# Patient Record
Sex: FEMALE | Race: WHITE | Employment: FULL TIME | URBAN - METROPOLITAN AREA
[De-identification: names, ages, dates, MRNs, and addresses within clinical notes are randomized per-mention and may not be internally consistent; named-entity substitution may affect disease eponyms.]

---

## 2017-06-23 ENCOUNTER — OFFICE VISIT (OUTPATIENT)
Dept: FAMILY MEDICINE | Facility: CLINIC | Age: 37
End: 2017-06-23

## 2017-06-23 VITALS
DIASTOLIC BLOOD PRESSURE: 70 MMHG | HEIGHT: 65 IN | HEART RATE: 71 BPM | OXYGEN SATURATION: 96 % | RESPIRATION RATE: 20 BRPM | SYSTOLIC BLOOD PRESSURE: 103 MMHG | WEIGHT: 197 LBS | TEMPERATURE: 97.9 F | BODY MASS INDEX: 32.82 KG/M2

## 2017-06-23 DIAGNOSIS — F64.0 GENDER DYSPHORIA IN ADULT: Primary | ICD-10-CM

## 2017-06-23 DIAGNOSIS — F15.11 METHAMPHETAMINE ABUSE IN REMISSION (H): ICD-10-CM

## 2017-06-23 RX ORDER — LURASIDONE HYDROCHLORIDE 80 MG/1
80 TABLET, FILM COATED ORAL DAILY
Qty: 1 TABLET | Refills: 0 | COMMUNITY
Start: 2017-06-23 | End: 2019-07-02

## 2017-06-23 RX ORDER — PRAZOSIN HYDROCHLORIDE 1 MG/1
3 CAPSULE ORAL AT BEDTIME
Qty: 1 CAPSULE | Refills: 0 | COMMUNITY
Start: 2017-06-23 | End: 2017-07-21

## 2017-06-23 RX ORDER — GABAPENTIN 100 MG/1
100 CAPSULE ORAL 2 TIMES DAILY
Qty: 1 CAPSULE | Refills: 0 | COMMUNITY
Start: 2017-06-23 | End: 2019-08-08

## 2017-06-23 NOTE — MR AVS SNAPSHOT
After Visit Summary   6/23/2017    Jesus Hurd    MRN: [id number]           Patient Information     Date Of Birth          1980        Visit Information        Provider Department      6/23/2017 3:20 PM Marychuy Fraire MD [facility]        Today's Diagnoses     Gender dysphoria in adult    -  1    Bipolar 1 disorder (H)        Anxiety        PTSD (post-traumatic stress disorder)          Care Instructions              Informed Consent   Testosterone Therapy       This form refers to the use of testosterone by persons in the female-to-male spectrum who wish to become more masculine to reduce gender dysphoria and facilitate a more masculine gender presentation. While there are risks associated with taking testosterone, when appropriately prescribed it can greatly improve mental health and quality of life.     Please seek another opinion if you want additional perspective on any aspect of your care.       Masculinizing Effects     1. I understand that testosterone may be prescribed to reduce female physical characteristics and masculinize my body.     2. I understand that the masculinizing effects of testosterone can take several months to a year to become noticeable, that the rate and degree of change can't be predicted, and that changes may not be complete for 2-5 years after I start testosterone.     3. I understand that these changes will likely be permanent even if I stop taking testosterone:    ? Lower voice pitch (i.e., voice becoming deeper).   ? Increased growth of hair, with thicker/coarser hairs, on arms, legs, chest, back, and abdomen.   ? Gradual growth of moustache/facial hair.   ? Hair loss at the temples and crown of the head, with the possibility of becoming completely bald.   ? Genital changes may or may not be permanent if testosterone is stopped. These include clitoral growth (typically 1-3 cm) and vaginal dryness.     4. I understand that the  "following changes are usually not permanent (that is, they will likely reverse if I stop taking testosterone). Although testosterone does not change these features, there are other treatments that may be helpful:    ? Acne, which may be severe and can cause permanent scarring if not treated.   ? Fat may redistribute to a more masculine pattern (decreased on buttocks/hips/thighs, increased in abdomen - changing from \"pear shape\" to \"apple shape\").   ? Increased muscle mass and upper body strength.   ? Increased libido (sex drive).   ? Menstrual periods typically stop within 3-6 months of starting testosterone.   5. I understand that it is not known what the effects of testosterone are on fertility. Fertility is reduced and I may never be able to get pregnant, even if I stop testosterone. On the other hand, even if my period stops, it may still be possible for me to get pregnant, and I am aware of birth control options (if applicable). I have been informed that I can't take testosterone if I am pregnant.     6. I understand that there are some aspects of my body that will not be changed by testosterone:   ? Breasts may appear slightly smaller due to fat loss, but will not substantially shrink   ? Although voice pitch will likely drop, other aspects of speech will not become more masculine.       Risks of Testosterone     7. I understand that the medical effects and safety of testosterone are not fully understood, and that there may be long-term risks that are not yet known.     8. I understand that I am strongly advised not to take more testosterone than I am prescribed, as this increases health risks. I have been informed that taking more will not make masculinization happen more quickly or increase the degree of change.     9. I understand that testosterone can cause changes that increase my risk of heart disease, including:     ? Decreasing good cholesterol (HDL) and increasing bad cholesterol (LDL)   ? Increasing " blood pressure   ? Increasing deposits of fat around my internal organ    I have been advised that my risks of heart disease are greater if people in my family have had heart disease, if I am overweight, or if I smoke.   I have been advised that heart health checkups, including monitoring of my weight and cholesterol levels, should be done periodically as long as I am taking testosterone.     10. I understand that testosterone can damage the liver, possibly leading to liver disease. I have been advised that I should be monitored for as long as I am taking testosterone.     11. I understand that testosterone can increase the red blood cells and hemoglobin, and while the increase is usually only to a normal male range (which does not pose health risks), a high increase can cause potentially life-threatening problems such as stroke and heart attack. I have been advised that my blood should be monitored periodically while I am taking testosterone.     12. I understand that taking testosterone can increase my risk for diabetes by decreasing my body's response to insulin, causing weight gain, and increasing deposits of fat around my internal organs. I have been advised that my fasting blood glucose should be monitored periodically while I am taking testosterone.       13. I understand that it is not known if testosterone increases the risk of ovarian, breast, or uterine cancer.     14. I understand that taking testosterone can lead to my cervix and the walls of my vagina becoming more fragile, and that this can lead to tears or abrasions that increase the risk of sexually transmitted infections (including HIV) if I have vaginal sex - no matter what the gender of my partner is. I have been advised that sandra discussion with my doctor about my sexual practices can help determine how best to prevent and monitor for sexually transmitted infections.     15. I have been informed that testosterone can cause headaches or  migraines. I understand that if I am frequently having headaches or migraines, or the pain is unusually severe, it is recommended that I talk with my health care provider.     16. I understand that testosterone can cause emotional changes, including increased irritability, frustration, and anger. I have been advised that my doctor can assist me in finding resources to explore and cope with these changes.     17. I understand that testosterone will result in changes that will be noticeable by other people, and that some transgender people in similar circumstances have experienced harassment, discrimination, and violence, while others have lost support of loved ones. I have been advised that my doctor can assist me in finding advocacy and support resources.     Prevention of Medical Complications       18. I agree to take testosterone as prescribed and to tell my doctor if I am not happy with the treatment or am experiencing any problems.     19. I understand that the right dose or type of medication prescribed for me may not be the same as for someone else.     20. I understand that physical examinations and blood tests are needed on a regular basis to check for negative side effects of testosterone.     21. I understand that testosterone can interact with other medication (including other sources of hormones), dietary supplements, herbs, alcohol, and street drugs. I understand that being honest with my doctor about what else I am taking will help prevent medical complications that could be life-threatening. I have been informed that I will continue to get medical care no matter what information I share.     22. I understand that some medical conditions make it dangerous to take testosterone. I agree that I will be checked for risky conditions before the decision to take testosterone is made.     23. I understand that I can choose to stop taking testosterone at any time, and that it is advised that I do this with the  help of my doctor to make sure there are no negative reactions to stopping. I understand that my doctor may suggest I reduce or stop taking testosterone if there are severe side effects or health risks that can't be controlled.         My signature below confirms that:       ? My doctor has talked with me about the benefits and risks of testosterone, the possible or likely consequences of hormone therapy, and potential alternative treatment options.   ? I understand the risks that may be involved.   ? I understand that this form covers known effects and risks and that there may be long-term effects or risks that are not yet known  ? I have had sufficient opportunity to discuss treatment options with my doctor. All of my questions have been answered to my satisfaction.   ? I believe I have adequate knowledge on which to base informed consent to the provision of testosterone therapy.     Based on this:   _____ I wish to begin taking testosterone.     _____ I do not wish to begin taking testosterone at this time.         Whatever your current decision is, please talk with your doctor any time you have questions, concerns, or want to re-evaluate your options.         ____________________________________ __________________   Patient Signature     Date           ____________________________________ __________________   Prescribing clinician Signature    Date         Some online resources for transgender health    [place] Transgender Health Coalition   Home to the [facility] at [address] [place] [address], [phone]. Also has support groups.  http://www.[place]transhealth.org/    [facility] for Transgender Health  Increasing access to comprehensive, effective, and affirming healthcare services for trans and gender-variant communities.  http://www.transhealth.[facility].edu/trans?page=joey-00-05    [facility]   Community-based non-profit committed to advancing the health & wellness of LGBTQ communities  through research, education and advocacy. http://www.rainbowhealth.org    [facility] Glossary of Transgender Terms  http://www.[facility].org/site/DocServer/Handout_7-C_Glossary_of_Gender_and_Transgender_Terms__fi.pdf?bwwFU=3962    Safe, gender-neutral public restrooms in [facility]   http://www.[place]transhealth.org//index.php?option=com_content&task=view&id=12&Itemid    Trans Youth Support Network  For people 26 and under who identify as a trans or gender non-conforming person and want to be a part of an activist organization. Offers peer support, education and community building opportunities.   http://www.transyouthsupportnetwork.org/    Reclaim:  RECLAIM offers mental and integrative health services for LGBTQ youth and their families.  http://www.reclaim-lgbtyouth.org/    Gender Spectrum, for Trans Youth  Gender Spectrum provides education, training and support to help create a gender sensitive and inclusive environment for all children and teens. http://www.genderspectrum.org/    [employer] FTM Resource Guide  Information on topics of interest to female-to-male (FTM, F2M) trans men, and their friends and loved ones.  http://ftmguide.org/    Also check out your local [employer] queer resource center!  LGBTQIA Services at [facility]: http://www.[facility]/lgbtqia/  LGBTQ@[facility]: http://www.[facility]/multiculturallife/lgbtq/  GLBTA Programs office at [facility]: https://diversity.[facility].edu/glbta/            Thoughts of self harm?   Trans Lifeline can be reached at [phone].   This service is staffed by trans people 24/7.   For LGBT youth (ages 24 and younger) contemplating suicide, the [employer] Lifeline can be reached at 1-187-3482.               Follow-ups after your visit        Who to contact     Please call your clinic at [phone] to:    Ask questions about your health    Make or cancel appointments    Discuss your medicines    Learn about your test results    Speak to your doctor   If you  "have compliments or concerns about an experience at your clinic, or if you wish to file a complaint, please contact [facility] Patient Relations at [phone] or email us at [email or web address]         Additional Information About Your Visit        [employer] Information     [employer] is an electronic gateway that provides easy, online access to your medical records. With [employer], you can request a clinic appointment, read your test results, renew a prescription or communicate with your care team.     To sign up for [employer] visit the website at [email or web address]   You will be asked to enter the access code listed below, as well as some personal information. Please follow the directions to create your username and password.     Your access code is: [email or web address]  Expires: 2017  3:57 PM     Your access code will  in 90 days. If you need help or a new code, please contact your [facility] or call [phone] for assistance.        Care EveryWhere ID     This is your Care EveryWhere ID. This could be used by other organizations to access your [place] medical records  [id number]        Your Vitals Were     Pulse Temperature Respirations Height Last Period Pulse Oximetry    71 97.9  F (36.6  C) (Oral) 20 5' 5\" (165.1 cm) 2017 96%    Breastfeeding? BMI (Body Mass Index)                No 32.78 kg/m2           Blood Pressure from Last 3 Encounters:   17 103/70   13 138/90    Weight from Last 3 Encounters:   17 197 lb (89.4 kg)              We Performed the Following     CBC with Diff Plt     Glucose     Hepatic Panel     Lipid Cascade     Testosterone Total          Today's Medication Changes          These changes are accurate as of: 17  3:57 PM.  If you have any questions, ask your nurse or doctor.               Stop taking these medicines if you haven't already. Please contact your care team if you have " questions.     HYDROcodone-acetaminophen 5-325 MG per tablet   Commonly known as:  NORCO   Stopped by:  Marychuy Fraire MD           HYDROXYZINE HCL PO   Stopped by:  Marychuy Fraire MD                    Primary Care Provider Office Phone # Fax #    Marychuy Fraire [phone]       [facility] [address]  [facility] [address]        Equal Access to Services     DAVEY MIRELES : Hadii aad ku hadasho Soomaali, waaxda luqadaha, qaybta kaalmada adeegyada, waxay idiin hayaan debby garciaconcepcionsamantha slaughter . So [facility] [phone].    ATENCIÓN: Si habla español, tiene a madera disposición servicios gratuitos de asistencia lingüística. Treame al [phone].    We comply with applicable federal civil rights laws and [place] laws. We do not discriminate on the basis of race, color, national origin, age, disability sex, sexual orientation or gender identity.            Thank you!     Thank you for choosing [facility]  for your care. Our goal is always to provide you with excellent care. Hearing back from our patients is one way we can continue to improve our services. Please take a few minutes to complete the written survey that you may receive in the mail after your visit with us. Thank you!             Your Updated Medication List - Protect others around you: Learn how to safely use, store and throw away your medicines at www.disposemymeds.org.          This list is accurate as of: 6/23/17  3:57 PM.  Always use your most recent med list.                   Brand Name Dispense Instructions for use Diagnosis    gabapentin 100 MG capsule    NEURONTIN    1 capsule    Take 1 capsule (100 mg) by mouth 2 times daily 100-300mg at night    Anxiety       LATUDA 80 MG Tabs tablet   Generic drug:  lurasidone     1 tablet    Take 1 tablet (80 mg) by mouth daily    Bipolar 1 disorder (H)       prazosin 1 MG capsule    MINIPRESS    1 capsule    Take 3 capsules (3 mg) by mouth At Bedtime     PTSD (post-traumatic stress disorder)       TYLENOL PO      Take 325 mg by mouth every 4 hours as needed
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
## 2017-06-23 NOTE — PROGRESS NOTES
Preceptor Attestation:   Patient seen and discussed with the resident. Assessment and plan reviewed with resident and agreed upon.   Supervising Physician:  Orlin Wilde MD  [facility] Medicine

## 2017-06-23 NOTE — PATIENT INSTRUCTIONS
"          Informed Consent   Testosterone Therapy       This form refers to the use of testosterone by persons in the female-to-male spectrum who wish to become more masculine to reduce gender dysphoria and facilitate a more masculine gender presentation. While there are risks associated with taking testosterone, when appropriately prescribed it can greatly improve mental health and quality of life.     Please seek another opinion if you want additional perspective on any aspect of your care.       Masculinizing Effects     1. I understand that testosterone may be prescribed to reduce female physical characteristics and masculinize my body.     2. I understand that the masculinizing effects of testosterone can take several months to a year to become noticeable, that the rate and degree of change can't be predicted, and that changes may not be complete for 2-5 years after I start testosterone.     3. I understand that these changes will likely be permanent even if I stop taking testosterone:    ? Lower voice pitch (i.e., voice becoming deeper).   ? Increased growth of hair, with thicker/coarser hairs, on arms, legs, chest, back, and abdomen.   ? Gradual growth of moustache/facial hair.   ? Hair loss at the temples and crown of the head, with the possibility of becoming completely bald.   ? Genital changes may or may not be permanent if testosterone is stopped. These include clitoral growth (typically 1-3 cm) and vaginal dryness.     4. I understand that the following changes are usually not permanent (that is, they will likely reverse if I stop taking testosterone). Although testosterone does not change these features, there are other treatments that may be helpful:    ? Acne, which may be severe and can cause permanent scarring if not treated.   ? Fat may redistribute to a more masculine pattern (decreased on buttocks/hips/thighs, increased in abdomen - changing from \"pear shape\" to \"apple shape\").   ? Increased " muscle mass and upper body strength.   ? Increased libido (sex drive).   ? Menstrual periods typically stop within 3-6 months of starting testosterone.   5. I understand that it is not known what the effects of testosterone are on fertility. Fertility is reduced and I may never be able to get pregnant, even if I stop testosterone. On the other hand, even if my period stops, it may still be possible for me to get pregnant, and I am aware of birth control options (if applicable). I have been informed that I can't take testosterone if I am pregnant.     6. I understand that there are some aspects of my body that will not be changed by testosterone:   ? Breasts may appear slightly smaller due to fat loss, but will not substantially shrink   ? Although voice pitch will likely drop, other aspects of speech will not become more masculine.       Risks of Testosterone     7. I understand that the medical effects and safety of testosterone are not fully understood, and that there may be long-term risks that are not yet known.     8. I understand that I am strongly advised not to take more testosterone than I am prescribed, as this increases health risks. I have been informed that taking more will not make masculinization happen more quickly or increase the degree of change.     9. I understand that testosterone can cause changes that increase my risk of heart disease, including:     ? Decreasing good cholesterol (HDL) and increasing bad cholesterol (LDL)   ? Increasing blood pressure   ? Increasing deposits of fat around my internal organ    I have been advised that my risks of heart disease are greater if people in my family have had heart disease, if I am overweight, or if I smoke.   I have been advised that heart health checkups, including monitoring of my weight and cholesterol levels, should be done periodically as long as I am taking testosterone.     10. I understand that testosterone can damage the liver, possibly  leading to liver disease. I have been advised that I should be monitored for as long as I am taking testosterone.     11. I understand that testosterone can increase the red blood cells and hemoglobin, and while the increase is usually only to a normal male range (which does not pose health risks), a high increase can cause potentially life-threatening problems such as stroke and heart attack. I have been advised that my blood should be monitored periodically while I am taking testosterone.     12. I understand that taking testosterone can increase my risk for diabetes by decreasing my body's response to insulin, causing weight gain, and increasing deposits of fat around my internal organs. I have been advised that my fasting blood glucose should be monitored periodically while I am taking testosterone.       13. I understand that it is not known if testosterone increases the risk of ovarian, breast, or uterine cancer.     14. I understand that taking testosterone can lead to my cervix and the walls of my vagina becoming more fragile, and that this can lead to tears or abrasions that increase the risk of sexually transmitted infections (including HIV) if I have vaginal sex - no matter what the gender of my partner is. I have been advised that sandra discussion with my doctor about my sexual practices can help determine how best to prevent and monitor for sexually transmitted infections.     15. I have been informed that testosterone can cause headaches or migraines. I understand that if I am frequently having headaches or migraines, or the pain is unusually severe, it is recommended that I talk with my health care provider.     16. I understand that testosterone can cause emotional changes, including increased irritability, frustration, and anger. I have been advised that my doctor can assist me in finding resources to explore and cope with these changes.     17. I understand that testosterone will result in changes  that will be noticeable by other people, and that some transgender people in similar circumstances have experienced harassment, discrimination, and violence, while others have lost support of loved ones. I have been advised that my doctor can assist me in finding advocacy and support resources.     Prevention of Medical Complications       18. I agree to take testosterone as prescribed and to tell my doctor if I am not happy with the treatment or am experiencing any problems.     19. I understand that the right dose or type of medication prescribed for me may not be the same as for someone else.     20. I understand that physical examinations and blood tests are needed on a regular basis to check for negative side effects of testosterone.     21. I understand that testosterone can interact with other medication (including other sources of hormones), dietary supplements, herbs, alcohol, and street drugs. I understand that being honest with my doctor about what else I am taking will help prevent medical complications that could be life-threatening. I have been informed that I will continue to get medical care no matter what information I share.     22. I understand that some medical conditions make it dangerous to take testosterone. I agree that I will be checked for risky conditions before the decision to take testosterone is made.     23. I understand that I can choose to stop taking testosterone at any time, and that it is advised that I do this with the help of my doctor to make sure there are no negative reactions to stopping. I understand that my doctor may suggest I reduce or stop taking testosterone if there are severe side effects or health risks that can't be controlled.         My signature below confirms that:       ? My doctor has talked with me about the benefits and risks of testosterone, the possible or likely consequences of hormone therapy, and potential alternative treatment options.   ? I  understand the risks that may be involved.   ? I understand that this form covers known effects and risks and that there may be long-term effects or risks that are not yet known  ? I have had sufficient opportunity to discuss treatment options with my doctor. All of my questions have been answered to my satisfaction.   ? I believe I have adequate knowledge on which to base informed consent to the provision of testosterone therapy.     Based on this:   _____ I wish to begin taking testosterone.     _____ I do not wish to begin taking testosterone at this time.         Whatever your current decision is, please talk with your doctor any time you have questions, concerns, or want to re-evaluate your options.         ____________________________________ __________________   Patient Signature     Date           ____________________________________ __________________   Prescribing clinician Signature    Date         Some online resources for transgender health    [place] [facility]   Home to the [facility] at [address] [place] [address], [phone]. Also has support groups.  http://www.[place]transhealth.org/    [facility] Transgender [facility]  Increasing access to comprehensive, effective, and affirming healthcare services for trans and gender-variant communities.  http://www.transhealth.[facility].edu/trans?page=joey-00-05    [facility]   Community-based non-profit committed to advancing the health & wellness of LGBTQ communities through research, education and advocacy. http://www.idiaghealth.org    [facility] Glossary of Transgender Terms  http://www.[facility].org/site/DocServer/Handout_7-C_Glossary_of_Gender_and_Transgender_Terms__fi.pdf?odjYY=8671    Safe, gender-neutral public restrooms in the [facility]   http://www.[place]transhealth.org//index.php?option=com_content&task=view&id=12&Itemid    Trans Youth Support Network  For people 26 and under who identify as a trans or  gender non-conforming person and want to be a part of an activist organization. Offers peer support, education and community building opportunities.   http://www.transyouthsupportnetwork.org/    [facility]:  [facility] offers mental and integrative health services for LGBTQ youth and their families.  http://www.reclaim-lgbtyouth.org/    Gender Spectrum, for Trans Youth  Gender Spectrum provides education, training and support to help create a gender sensitive and inclusive environment for all children and teens. http://www.genderspectrum.org/    [facility] FTM Resource Guide  Information on topics of interest to female-to-male (FTM, F2M) trans men, and their friends and loved ones.  http://[employer].org/    Also check out your local [facility] resource center!  LGBTQIA Services at [facility]: http://www.[facility]/lgbtqia/  LGBTQ@[facility]: http://www.[facility]/multiculturallife/lgbtq/  GLBTA Programs office at [facility]: https://diversity.[employer].[facility]/glbta/            Thoughts of self harm?   Trans Lifeline can be reached at [phone].   This service is staffed by trans people 24/7.   For LGBT youth (ages 24 and younger) contemplating suicide, the [employer] Lifeline can be reached at 4-585-5873.

## 2017-06-27 PROBLEM — F41.9 ANXIETY: Status: ACTIVE | Noted: 2017-06-27

## 2017-06-27 PROBLEM — Z91.51 H/O: ATTEMPTED SUICIDE: Status: ACTIVE | Noted: 2017-06-27

## 2017-06-27 PROBLEM — F43.10 PTSD (POST-TRAUMATIC STRESS DISORDER): Status: ACTIVE | Noted: 2017-06-27

## 2017-06-27 PROBLEM — F64.0 GENDER DYSPHORIA IN ADULT: Status: ACTIVE | Noted: 2017-06-27

## 2017-06-27 PROBLEM — F31.9 BIPOLAR 1 DISORDER (H): Status: ACTIVE | Noted: 2017-06-27

## 2017-07-21 ENCOUNTER — OFFICE VISIT (OUTPATIENT)
Dept: FAMILY MEDICINE | Facility: CLINIC | Age: 37
End: 2017-07-21

## 2017-07-21 VITALS
WEIGHT: 205.4 LBS | BODY MASS INDEX: 34.18 KG/M2 | RESPIRATION RATE: 18 BRPM | OXYGEN SATURATION: 96 % | HEART RATE: 68 BPM | TEMPERATURE: 98.6 F | DIASTOLIC BLOOD PRESSURE: 72 MMHG | SYSTOLIC BLOOD PRESSURE: 110 MMHG

## 2017-07-21 DIAGNOSIS — F64.0 GENDER DYSPHORIA IN ADULT: Primary | ICD-10-CM

## 2017-07-21 ASSESSMENT — ANXIETY QUESTIONNAIRES
GAD7 TOTAL SCORE: 6
7. FEELING AFRAID AS IF SOMETHING AWFUL MIGHT HAPPEN: NOT AT ALL
IF YOU CHECKED OFF ANY PROBLEMS ON THIS QUESTIONNAIRE, HOW DIFFICULT HAVE THESE PROBLEMS MADE IT FOR YOU TO DO YOUR WORK, TAKE CARE OF THINGS AT HOME, OR GET ALONG WITH OTHER PEOPLE: VERY DIFFICULT
1. FEELING NERVOUS, ANXIOUS, OR ON EDGE: SEVERAL DAYS
6. BECOMING EASILY ANNOYED OR IRRITABLE: SEVERAL DAYS
2. NOT BEING ABLE TO STOP OR CONTROL WORRYING: NOT AT ALL
5. BEING SO RESTLESS THAT IT IS HARD TO SIT STILL: MORE THAN HALF THE DAYS
3. WORRYING TOO MUCH ABOUT DIFFERENT THINGS: NOT AT ALL

## 2017-07-21 ASSESSMENT — PATIENT HEALTH QUESTIONNAIRE - PHQ9: 5. POOR APPETITE OR OVEREATING: MORE THAN HALF THE DAYS

## 2017-07-21 NOTE — MR AVS SNAPSHOT
After Visit Summary   7/21/2017    Jesus Hurd    MRN: [id number]           Patient Information     Date Of Birth          1980        Visit Information        Provider Department      7/21/2017 1:00 PM Salazar Sinclair MD [facility]        Today's Diagnoses     Gender dysphoria in adult    -  1      Care Instructions    Here is the plan from today's visit    1. Gender dysphoria in adult  - Please bring in letter of support of psychiatrist and therapist    Please call or return to clinic if your symptoms don't go away.    Follow up plan  Please make a clinic appointment for follow up with me (SALAZAR SINCLAIR) in 1-2  weeks for hormone therapy.    Thank you for coming to [facility] today.  Lab Testing:  **If you had lab testing today and your results are reassuring or normal they will be mailed to you or sent through DisplayLink within 7 days.   **If the lab tests need quick action we will call you with the results.  The phone number we will call with results is # [phone] (home) . If this is not the best number please call our clinic and change the number.  Medication Refills:  If you need any refills please call your pharmacy and they will contact us.   If you need to  your refill at a new pharmacy, please contact the new pharmacy directly. The new pharmacy will help you get your medications transferred faster.   Scheduling:  If you have any concerns about today's visit or wish to schedule another appointment please call our office during normal business hours [phone] (8-5:00 M-F)  If a referral was made to a [facility] and you don't get a call from central scheduling please call [phone].  If a Mammogram was ordered for you at [facility] call [phone] to schedule or change your appointment.  If you had an XRay/CT/Ultrasound/MRI ordered the number is [phone] to schedule or change your radiology  appointment.   Medical Concerns:  If you have urgent medical concerns please call [phone] at any time of the day.            Follow-ups after your visit        Who to contact     Please call your clinic at [phone] to:    Ask questions about your health    Make or cancel appointments    Discuss your medicines    Learn about your test results    Speak to your doctor   If you have compliments or concerns about an experience at your clinic, or if you wish to file a complaint, please contact [facility] Patient Relations at [phone] or email us at [email or web address]         Additional Information About Your Visit        Accelitechart Information     Nano Thinkt is an electronic gateway that provides easy, online access to your medical records. With Stumpwise, you can request a clinic appointment, read your test results, renew a prescription or communicate with your care team.     To sign up for Nano Thinkt visit the website at [email or web address]   You will be asked to enter the access code listed below, as well as some personal information. Please follow the directions to create your username and password.     Your access code is: [email or web address]  Expires: 2017  3:57 PM     Your access code will  in 90 days. If you need help or a new code, please contact your [facility] or call [phone] for assistance.        Care EveryWhere ID     This is your Care EveryWhere ID. This could be used by other organizations to access your [place] medical records  [id number]        Your Vitals Were     Pulse Temperature Respirations Last Period Pulse Oximetry BMI (Body Mass Index)    68 98.6  F (37  C) (Oral) 18 2017 96% 34.18 kg/m2       Blood Pressure from Last 3 Encounters:   17 110/72   17 103/70   13 138/90    Weight from Last 3 Encounters:   17 205 lb 6.4 oz (93.2 kg)   17 197 lb (89.4 kg)              Today,  you had the following     No orders found for display         Today's Medication Changes          These changes are accurate as of: 7/21/17  1:25 PM.  If you have any questions, ask your nurse or doctor.               Stop taking these medicines if you haven't already. Please contact your care team if you have questions.     prazosin 1 MG capsule   Commonly known as:  MINIPRESS   Stopped by:  Tory Dhillon MD                    Primary Care Provider Office Phone # Fax #    Marychuy DENICE Fraire [phone]       [facility] [address] [facility] [address]        Equal Access to Services     [facility]: Hadii jillian ku hadasho Soomaali, waaxda luqadaha, qaybta kaalmada manuel, dejon slaughter . So [facility] [phone].    ATENCIÓN: Si habla español, tiene a madera disposición servicios gratuitos de asistencia lingüística. [facility] [phone].    We comply with applicable federal civil rights laws and [place] laws. We do not discriminate on the basis of race, color, national origin, age, disability sex, sexual orientation or gender identity.            Thank you!     Thank you for choosing [facility]  for your care. Our goal is always to provide you with excellent care. Hearing back from our patients is one way we can continue to improve our services. Please take a few minutes to complete the written survey that you may receive in the mail after your visit with us. Thank you!             Your Updated Medication List - Protect others around you: Learn how to safely use, store and throw away your medicines at www.disposemymeds.org.          This list is accurate as of: 7/21/17  1:25 PM.  Always use your most recent med list.                   Brand Name Dispense Instructions for use Diagnosis    gabapentin 100 MG capsule    NEURONTIN    1 capsule    Take 1 capsule (100 mg) by mouth 2 times daily 100-300mg at night        LATUDA 80 MG Tabs  tablet   Generic drug:  lurasidone     1 tablet    Take 1 tablet (80 mg) by mouth daily        TYLENOL PO      Take 325 mg by mouth every 4 hours as needed

## 2017-07-21 NOTE — PATIENT INSTRUCTIONS
Here is the plan from today's visit    1. Gender dysphoria in adult  - Please bring in letter of support of psychiatrist and therapist    Please call or return to clinic if your symptoms don't go away.    Follow up plan  Please make a clinic appointment for follow up with me (SALAZAR SINCLAIR) in 1-2  weeks for hormone therapy.    Thank you for coming to [facility] today.  Lab Testing:  **If you had lab testing today and your results are reassuring or normal they will be mailed to you or sent through Powerset within 7 days.   **If the lab tests need quick action we will call you with the results.  The phone number we will call with results is # [phone] (home) . If this is not the best number please call our clinic and change the number.  Medication Refills:  If you need any refills please call your pharmacy and they will contact us.   If you need to  your refill at a new pharmacy, please contact the new pharmacy directly. The new pharmacy will help you get your medications transferred faster.   Scheduling:  If you have any concerns about today's visit or wish to schedule another appointment please call our office during normal business hours [phone] (8-5:00 M-F)  If a referral was made to a [facility] and you don't get a call from central scheduling please call [phone].  If a Mammogram was ordered for you at [facility] call [phone] to schedule or change your appointment.  If you had an XRay/CT/Ultrasound/MRI ordered the number is [phone] to schedule or change your radiology appointment.   Medical Concerns:  If you have urgent medical concerns please call [phone] at any time of the day.

## 2017-07-21 NOTE — PROGRESS NOTES
SHAMEKA Lee is a 36 year old individual that uses pronouns He/Him/His/Himself that presents today for follow up of:  masculinizing hormone therapy. Gender identity: Male    Any special concerns today?  no current concerns  Sees therapist Dr. Joyce Griggs (verbally ok with hormone therapy) and psychiatrist, Dr. Paul at [facility]. States good support system with his wife. He is excited for his wife to have a baby and they have 2 other children together. He attends group and trauma therapy weekly.     On hormones?  No  ---    Past Surgical History:   Procedure Laterality Date     BREAST SURGERY         Patient Active Problem List   Diagnosis     Bipolar 1 disorder (H)     Anxiety     Gender dysphoria in adult     PTSD (post-traumatic stress disorder)     H/O: attempted suicide     Methamphetamine abuse in remission       Current Outpatient Prescriptions   Medication Sig Dispense Refill     lurasidone (LATUDA) 80 MG TABS tablet Take 1 tablet (80 mg) by mouth daily 1 tablet 0     gabapentin (NEURONTIN) 100 MG capsule Take 1 capsule (100 mg) by mouth 2 times daily 100-300mg at night 1 capsule 0     Acetaminophen (TYLENOL PO) Take 325 mg by mouth every 4 hours as needed        prazosin (MINIPRESS) 1 MG capsule Take 3 capsules (3 mg) by mouth At Bedtime 1 capsule 0       History   Smoking Status     Current Every Day Smoker     Packs/day: 0.50     Years: 21.00   Smokeless Tobacco     Not on file     Comment: using nicotine inhaler which helps          Allergies   Allergen Reactions     Nickel Itching     Seroquel [Quetiapine] Other (See Comments)     Restless leg syndrom       Problem, Medication and Allergy Lists were reviewed and updated if needed..          Review of Systems:   Denies fever, chills, SOB, chest pain, dizziness, dysuria, abnormal vaginal bleeding, abdominal pain, change in appetite, no dysphoric mood or anxiety. +R knee pain       Physical Exam:     Vitals:    07/21/17 1303   BP:  110/72   Pulse: 68   Resp: 18   Temp: 98.6  F (37  C)   TempSrc: Oral   SpO2: 96%   Weight: 205 lb 6.4 oz (93.2 kg)     BMI= Body mass index is 34.18 kg/(m^2).   Wt Readings from Last 10 Encounters:   07/21/17 205 lb 6.4 oz (93.2 kg)   06/23/17 197 lb (89.4 kg)     Appearance: Male appearance and dress    GENERAL: healthy, alert and no distress  EYES: Eyes grossly normal to inspection PERRL  HENT:  Nose normal, Mouth- no ulcers, no lesions  NECK: no adenopathy, no asymmetry, no masses,  and thyroid normal to palpation, supple  RESP: lungs clear to auscultation - no rales, no rhonchi, no wheezes  CV: regular rates and rhythm, normal S1 S2, no S3 or S4 and no murmur, no click or rub -  ABDOMEN: soft, no tenderness, no  hepatosplenomegaly, no masses, normal bowel sounds  MS: extremities normal- no gross deformities noted, no edema  SKIN: no suspicious lesions, no rashes  Psych: Alert and oriented times 3; coherent speech, normal rate and volume, able to articulate logical thoughts, able to abstract reason, no tangential thoughts, no hallucinations or delusions. Affect is appropriate.           Labs:    Results from the last 24 hoursNo results found for this or any previous visit (from the past 24 hour(s)).    Assessment and Plan     Jesus was seen today for recheck.    Diagnoses and all orders for this visit:    Gender dysphoria in adult  He is interested in masculinizing hormone therapy with testosterone injections. Will await psychiatrist and therapist letter of approval before initiation of hormone replacement therapy. Discussed benefits and risks of testosterone. Will be meeting with therapist and psychiatrist next week. Will obtain labs at next visit. RAGHAVENDRA signed for mental health clinic.   Contraception:   not needed since monogamous relationship with female  .   Counselled patient about controlled substances: Yes.    Follow up:  Follow up in 2 weeks.  Questions were elicited and answered.     Tory Dhillon,  MD

## 2017-07-22 ASSESSMENT — ANXIETY QUESTIONNAIRES: GAD7 TOTAL SCORE: 6

## 2017-07-24 NOTE — PROGRESS NOTES
Preceptor Attestation:   Patient seen and discussed with the resident. Assessment and plan reviewed with resident and agreed upon.   Supervising Physician:  Nila Blount MD  [facility]

## 2017-07-31 ENCOUNTER — TELEPHONE (OUTPATIENT)
Dept: FAMILY MEDICINE | Facility: CLINIC | Age: 37
End: 2017-07-31

## 2017-07-31 NOTE — TELEPHONE ENCOUNTER
Prasanth Spears~    Are you going to write for the testosterone and needles for this pt.  They came to clinic asking for Nurse only injection teaching but no meds are in  The chart.  Please advise if you will write for this and we will keep the nurse only scheduled.  Per HIM we did get the letter from the patients [facility] doctor that was requested and records.  Please advise so we can let the patient know.  If you want to see them first please send message back to me advising next steps and I will call Jesus.  Thanks!

## 2017-08-01 ENCOUNTER — MEDICAL CORRESPONDENCE (OUTPATIENT)
Dept: HEALTH INFORMATION MANAGEMENT | Facility: CLINIC | Age: 37
End: 2017-08-01

## 2017-08-01 NOTE — TELEPHONE ENCOUNTER
Can you please have the patient come in for a visit with me or another provider before initiation of testosterone. Thanks!    Tory Dhillon DO  [facility] PGY2

## 2017-08-02 ENCOUNTER — TELEPHONE (OUTPATIENT)
Dept: FAMILY MEDICINE | Facility: CLINIC | Age: 37
End: 2017-08-02

## 2017-08-02 NOTE — TELEPHONE ENCOUNTER
The patient is scheduled for a 10am today.  If the provider gives the testosterone, can a injection teaching be done at that time?  Thanks!

## 2017-08-02 NOTE — TELEPHONE ENCOUNTER
[facility] phone call message- patient requesting to speak with PCP or provider:    PCP: Tory Dhillon    Additional Comments: Pt would like to get a call from PCP to discuss about starting a testosterone     Is a call back needed? Yes    Patient informed that it may take up to 2 business days to hear back from PCP:Yes    OK to leave a message on voice mail? Yes    Primary language: English      needed? No    Call taken on August 2, 2017 at 8:19 AM by Mona Faust

## 2017-08-02 NOTE — TELEPHONE ENCOUNTER
PCP in clinic for AM shift seeing patients and likely unavailable. Message routed to PCP to follow up with patient when able.    Amanda Joy RN

## 2017-08-11 ENCOUNTER — OFFICE VISIT (OUTPATIENT)
Dept: FAMILY MEDICINE | Facility: CLINIC | Age: 37
End: 2017-08-11

## 2017-08-11 VITALS
DIASTOLIC BLOOD PRESSURE: 69 MMHG | OXYGEN SATURATION: 98 % | HEART RATE: 72 BPM | WEIGHT: 208.2 LBS | SYSTOLIC BLOOD PRESSURE: 105 MMHG | BODY MASS INDEX: 34.65 KG/M2

## 2017-08-11 DIAGNOSIS — F64.0 GENDER DYSPHORIA IN ADULT: Primary | ICD-10-CM

## 2017-08-11 LAB
% GRANULOCYTES: 53.6 %G (ref 40–75)
ALBUMIN SERPL-MCNC: 3.5 G/DL (ref 3.4–5)
ALP SERPL-CCNC: 92 U/L (ref 40–150)
ALT SERPL W P-5'-P-CCNC: 23 U/L (ref 0–50)
AST SERPL W P-5'-P-CCNC: 22 U/L (ref 0–45)
BILIRUB DIRECT SERPL-MCNC: <0.1 MG/DL (ref 0–0.2)
BILIRUB SERPL-MCNC: 0.2 MG/DL (ref 0.2–1.3)
CHOLEST SERPL-MCNC: 226 MG/DL
GLUCOSE CASUAL: 91 MG/DL (ref 51–200)
GRANULOCYTES #: 6.6 K/UL (ref 1.6–8.3)
HCT VFR BLD AUTO: 40.3 % (ref 35–47)
HDLC SERPL-MCNC: 43 MG/DL
HEMOGLOBIN: 12.9 G/DL (ref 11.7–15.7)
LDLC SERPL CALC-MCNC: 140 MG/DL
LYMPHOCYTES # BLD AUTO: 4.6 K/UL (ref 0.8–5.3)
LYMPHOCYTES NFR BLD AUTO: 37.7 %L (ref 20–48)
MCH RBC QN AUTO: 31.1 PG (ref 26.5–35)
MCHC RBC AUTO-ENTMCNC: 32 G/DL (ref 32–36)
MCV RBC AUTO: 97.2 FL (ref 78–100)
MID #: 1.1 K/UL (ref 0–2.2)
MID %: 8.7 %M (ref 0–20)
NONHDLC SERPL-MCNC: 183 MG/DL
PLATELET # BLD AUTO: 142 K/UL (ref 150–450)
PROT SERPL-MCNC: 7.7 G/DL (ref 6.8–8.8)
RBC # BLD AUTO: 4.15 M/UL (ref 3.8–5.2)
TRIGL SERPL-MCNC: 216 MG/DL
WBC # BLD AUTO: 12.3 K/UL (ref 4–11)

## 2017-08-11 RX ORDER — DEXAMETHASONE SODIUM PHOSPHATE 4 MG/ML
1 INJECTION, SOLUTION INTRAMUSCULAR; INTRAVENOUS ONCE
Qty: 1 EACH | Refills: 0 | Status: SHIPPED | OUTPATIENT
Start: 2017-08-11 | End: 2017-08-11

## 2017-08-11 RX ORDER — TESTOSTERONE CYPIONATE 200 MG/ML
25 INJECTION, SOLUTION INTRAMUSCULAR WEEKLY
Qty: 2 ML | Refills: 0 | Status: SHIPPED | OUTPATIENT
Start: 2017-08-11 | End: 2017-08-31

## 2017-08-11 NOTE — PATIENT INSTRUCTIONS
"Here is the plan from today's visit    1. Gender dysphoria in adult  - testosterone cypionate (DEPOTESTOTERONE) 200 MG/ML injection; Inject 0.13 mLs (26 mg) into the muscle once a week  Dispense: 2 mL; Refill: 0  - Needle, Disp, 25G X 1-1/2\" MISC; Use once weekly for administering hormone IM  Dispense: 25 each; Refill: 3  - needle, disp, 18G X 1\" MISC; Use to draw up hormones once weekly  Dispense: 25 each; Refill: 3  - syringe, disposable, 1 ML MISC; Use once weekly to draw up hormones  Dispense: 25 each; Refill: 3  - BD SHARPS CONTAINER HOME MISC; 1 Units once for 1 dose  Dispense: 1 each; Refill: 0  - Testosterone Total  - Hepatic Panel  - CBC with Diff Plt  - Lipid Cascade  - Glucose    Please call or return to clinic if your symptoms don't go away.    Follow up plan  Please make a clinic appointment for follow up with me (SALAZAR SINCLAIR) in 1 month.    Thank you for coming to [facility] today.  Lab Testing:  **If you had lab testing today and your results are reassuring or normal they will be mailed to you or sent through [facility] within 7 days.   **If the lab tests need quick action we will call you with the results.  The phone number we will call with results is # [phone] (home) . If this is not the best number please call our clinic and change the number.  Medication Refills:  If you need any refills please call your pharmacy and they will contact us.   If you need to  your refill at a new pharmacy, please contact the new pharmacy directly. The new pharmacy will help you get your medications transferred faster.   Scheduling:  If you have any concerns about today's visit or wish to schedule another appointment please call our office during normal business hours [phone] (8-5:00 M-F)  If a referral was made to a [facility] and you don't get a call from central scheduling please call [phone].  If a Mammogram was ordered for you at [facility] call " [phone] to schedule or change your appointment.  If you had an XRay/CT/Ultrasound/MRI ordered the number is [phone] to schedule or change your radiology appointment.   Medical Concerns:  If you have urgent medical concerns please call [phone] at any time of the day.            Informed Consent   Testosterone Therapy       This form refers to the use of testosterone by persons in the female-to-male spectrum who wish to become more masculine to reduce gender dysphoria and facilitate a more masculine gender presentation. While there are risks associated with taking testosterone, when appropriately prescribed it can greatly improve mental health and quality of life.     Please seek another opinion if you want additional perspective on any aspect of your care.       Masculinizing Effects     1. I understand that testosterone may be prescribed to reduce female physical characteristics and masculinize my body.     2. I understand that the masculinizing effects of testosterone can take several months to a year to become noticeable, that the rate and degree of change can't be predicted, and that changes may not be complete for 2-5 years after I start testosterone.     3. I understand that these changes will likely be permanent even if I stop taking testosterone:    ? Lower voice pitch (i.e., voice becoming deeper).   ? Increased growth of hair, with thicker/coarser hairs, on arms, legs, chest, back, and abdomen.   ? Gradual growth of moustache/facial hair.   ? Hair loss at the temples and crown of the head, with the possibility of becoming completely bald.   ? Genital changes may or may not be permanent if testosterone is stopped. These include clitoral growth (typically 1-3 cm) and vaginal dryness.     4. I understand that the following changes are usually not permanent (that is, they will likely reverse if I stop taking testosterone). Although testosterone does not change these features, there are other  "treatments that may be helpful:    ? Acne, which may be severe and can cause permanent scarring if not treated.   ? Fat may redistribute to a more masculine pattern (decreased on buttocks/hips/thighs, increased in abdomen - changing from \"pear shape\" to \"apple shape\").   ? Increased muscle mass and upper body strength.   ? Increased libido (sex drive).   ? Menstrual periods typically stop within 3-6 months of starting testosterone.   5. I understand that it is not known what the effects of testosterone are on fertility. Fertility is reduced and I may never be able to get pregnant, even if I stop testosterone. On the other hand, even if my period stops, it may still be possible for me to get pregnant, and I am aware of birth control options (if applicable). I have been informed that I can't take testosterone if I am pregnant.     6. I understand that there are some aspects of my body that will not be changed by testosterone:   ? Breasts may appear slightly smaller due to fat loss, but will not substantially shrink   ? Although voice pitch will likely drop, other aspects of speech will not become more masculine.       Risks of Testosterone     7. I understand that the medical effects and safety of testosterone are not fully understood, and that there may be long-term risks that are not yet known.     8. I understand that I am strongly advised not to take more testosterone than I am prescribed, as this increases health risks. I have been informed that taking more will not make masculinization happen more quickly or increase the degree of change.     9. I understand that testosterone can cause changes that increase my risk of heart disease, including:     ? Decreasing good cholesterol (HDL) and increasing bad cholesterol (LDL)   ? Increasing blood pressure   ? Increasing deposits of fat around my internal organ    I have been advised that my risks of heart disease are greater if people in my family have had heart " disease, if I am overweight, or if I smoke.   I have been advised that heart health checkups, including monitoring of my weight and cholesterol levels, should be done periodically as long as I am taking testosterone.     10. I understand that testosterone can damage the liver, possibly leading to liver disease. I have been advised that I should be monitored for as long as I am taking testosterone.     11. I understand that testosterone can increase the red blood cells and hemoglobin, and while the increase is usually only to a normal male range (which does not pose health risks), a high increase can cause potentially life-threatening problems such as stroke and heart attack. I have been advised that my blood should be monitored periodically while I am taking testosterone.     12. I understand that taking testosterone can increase my risk for diabetes by decreasing my body's response to insulin, causing weight gain, and increasing deposits of fat around my internal organs. I have been advised that my fasting blood glucose should be monitored periodically while I am taking testosterone.       13. I understand that it is not known if testosterone increases the risk of ovarian, breast, or uterine cancer.     14. I understand that taking testosterone can lead to my cervix and the walls of my vagina becoming more fragile, and that this can lead to tears or abrasions that increase the risk of sexually transmitted infections (including HIV) if I have vaginal sex - no matter what the gender of my partner is. I have been advised that sandra discussion with my doctor about my sexual practices can help determine how best to prevent and monitor for sexually transmitted infections.     15. I have been informed that testosterone can cause headaches or migraines. I understand that if I am frequently having headaches or migraines, or the pain is unusually severe, it is recommended that I talk with my health care provider.     16. I  understand that testosterone can cause emotional changes, including increased irritability, frustration, and anger. I have been advised that my doctor can assist me in finding resources to explore and cope with these changes.     17. I understand that testosterone will result in changes that will be noticeable by other people, and that some transgender people in similar circumstances have experienced harassment, discrimination, and violence, while others have lost support of loved ones. I have been advised that my doctor can assist me in finding advocacy and support resources.     Prevention of Medical Complications       18. I agree to take testosterone as prescribed and to tell my doctor if I am not happy with the treatment or am experiencing any problems.     19. I understand that the right dose or type of medication prescribed for me may not be the same as for someone else.     20. I understand that physical examinations and blood tests are needed on a regular basis to check for negative side effects of testosterone.     21. I understand that testosterone can interact with other medication (including other sources of hormones), dietary supplements, herbs, alcohol, and street drugs. I understand that being honest with my doctor about what else I am taking will help prevent medical complications that could be life-threatening. I have been informed that I will continue to get medical care no matter what information I share.     22. I understand that some medical conditions make it dangerous to take testosterone. I agree that I will be checked for risky conditions before the decision to take testosterone is made.     23. I understand that I can choose to stop taking testosterone at any time, and that it is advised that I do this with the help of my doctor to make sure there are no negative reactions to stopping. I understand that my doctor may suggest I reduce or stop taking testosterone if there are severe side  effects or health risks that can't be controlled.         My signature below confirms that:       ? My doctor has talked with me about the benefits and risks of testosterone, the possible or likely consequences of hormone therapy, and potential alternative treatment options.   ? I understand the risks that may be involved.   ? I understand that this form covers known effects and risks and that there may be long-term effects or risks that are not yet known  ? I have had sufficient opportunity to discuss treatment options with my doctor. All of my questions have been answered to my satisfaction.   ? I believe I have adequate knowledge on which to base informed consent to the provision of testosterone therapy.     Based on this:   _____ I wish to begin taking testosterone.     _____ I do not wish to begin taking testosterone at this time.         Whatever your current decision is, please talk with your doctor any time you have questions, concerns, or want to re-evaluate your options.         ____________________________________ __________________   Patient Signature     Date           ____________________________________ __________________   Prescribing clinician Signature    Date       Effects and Expected Time Course of Feminizing Hormones      Effect Expected Onset Expected Maximum Effect   Body Fat redistribution 3-6 months 2-5 years   Decreased Muscle mass 3-6 months 1-2 years   Softening of skin/decreased oiliness 3-6 months Unknown   Decreased Libido 1-3 months 1-2 years   Decreased Spontaneous Erections 1-3 months 3-6 months   Male Sexual Dysfunction Variable Variable   Breast Growth 3-6 months 2-3 years   Decreased Testicular volume 3-6 months 2-3 years   Decreased sperm production Variable Variable   Thinning and slowed growth of body and facial hair  + 6-12 months >3 years   Male pattern baldness No regrowth, loss stops 1-3 months 1-2 years   +complete removal of male facial hair and body hair requires  electrolysis, laser treatment or both    Effects and Expected Time Course of Masculinizing  Hormones      Effect Expected Onset Expected Maximum Effect   Skin oiliness/Acne 1-6 months 1-2 years   Facial/body hair growth 3-6 months 3-5 years    Scalp hair loss >12 months+ Variable   Increased muscle mass/strength 6-12 months 2-5 years   Body fat redistribution 3-6 months 2-5 years   Cessation of menses 2-6 months n/a   Clitoral enlargement 3-6 months 1-2 years   Vaginal atrophy 3-6 months 1-2 years   Deepened voice 3-12 months 1-2 years

## 2017-08-11 NOTE — MR AVS SNAPSHOT
"              After Visit Summary   8/11/2017    Jesus Hurd    MRN: [id number]           Patient Information     Date Of Birth          1980        Visit Information        Provider Department      8/11/2017 1:00 PM Salazar Sinclair MD [facility]        Today's Diagnoses     Gender dysphoria in adult    -  1      Care Instructions    Here is the plan from today's visit    1. Gender dysphoria in adult  - testosterone cypionate (DEPOTESTOTERONE) 200 MG/ML injection; Inject 0.13 mLs (26 mg) into the muscle once a week  Dispense: 2 mL; Refill: 0  - Needle, Disp, 25G X 1-1/2\" MISC; Use once weekly for administering hormone IM  Dispense: 25 each; Refill: 3  - needle, disp, 18G X 1\" MISC; Use to draw up hormones once weekly  Dispense: 25 each; Refill: 3  - syringe, disposable, 1 ML MISC; Use once weekly to draw up hormones  Dispense: 25 each; Refill: 3  - BD SHARPS CONTAINER HOME MISC; 1 Units once for 1 dose  Dispense: 1 each; Refill: 0  - Testosterone Total  - Hepatic Panel  - CBC with Diff Plt  - Lipid Cascade  - Glucose    Please call or return to clinic if your symptoms don't go away.    Follow up plan  Please make a clinic appointment for follow up with me (SALAZAR SINCLAIR) in 1 month.    Thank you for coming to [facility] today.  Lab Testing:  **If you had lab testing today and your results are reassuring or normal they will be mailed to you or sent through [employer] within 7 days.   **If the lab tests need quick action we will call you with the results.  The phone number we will call with results is # [phone] (home) . If this is not the best number please call our clinic and change the number.  Medication Refills:  If you need any refills please call your pharmacy and they will contact us.   If you need to  your refill at a new pharmacy, please contact the new pharmacy directly. The new pharmacy will help you get your medications transferred faster. "   Scheduling:  If you have any concerns about today's visit or wish to schedule another appointment please call our office during normal business hours [phone] (8-5:00 M-F)  If a referral was made to a [facility] and you don't get a call from central scheduling please call [phone].  If a Mammogram was ordered for you at [facility] call [phone] to schedule or change your appointment.  If you had an XRay/CT/Ultrasound/MRI ordered the number is [phone] to schedule or change your radiology appointment.   Medical Concerns:  If you have urgent medical concerns please call [phone] at any time of the day.            Informed Consent   Testosterone Therapy       This form refers to the use of testosterone by persons in the female-to-male spectrum who wish to become more masculine to reduce gender dysphoria and facilitate a more masculine gender presentation. While there are risks associated with taking testosterone, when appropriately prescribed it can greatly improve mental health and quality of life.     Please seek another opinion if you want additional perspective on any aspect of your care.       Masculinizing Effects     1. I understand that testosterone may be prescribed to reduce female physical characteristics and masculinize my body.     2. I understand that the masculinizing effects of testosterone can take several months to a year to become noticeable, that the rate and degree of change can't be predicted, and that changes may not be complete for 2-5 years after I start testosterone.     3. I understand that these changes will likely be permanent even if I stop taking testosterone:    ? Lower voice pitch (i.e., voice becoming deeper).   ? Increased growth of hair, with thicker/coarser hairs, on arms, legs, chest, back, and abdomen.   ? Gradual growth of moustache/facial hair.   ? Hair loss at the temples and crown of the head, with the possibility of  "becoming completely bald.   ? Genital changes may or may not be permanent if testosterone is stopped. These include clitoral growth (typically 1-3 cm) and vaginal dryness.     4. I understand that the following changes are usually not permanent (that is, they will likely reverse if I stop taking testosterone). Although testosterone does not change these features, there are other treatments that may be helpful:    ? Acne, which may be severe and can cause permanent scarring if not treated.   ? Fat may redistribute to a more masculine pattern (decreased on buttocks/hips/thighs, increased in abdomen - changing from \"pear shape\" to \"apple shape\").   ? Increased muscle mass and upper body strength.   ? Increased libido (sex drive).   ? Menstrual periods typically stop within 3-6 months of starting testosterone.   5. I understand that it is not known what the effects of testosterone are on fertility. Fertility is reduced and I may never be able to get pregnant, even if I stop testosterone. On the other hand, even if my period stops, it may still be possible for me to get pregnant, and I am aware of birth control options (if applicable). I have been informed that I can't take testosterone if I am pregnant.     6. I understand that there are some aspects of my body that will not be changed by testosterone:   ? Breasts may appear slightly smaller due to fat loss, but will not substantially shrink   ? Although voice pitch will likely drop, other aspects of speech will not become more masculine.       Risks of Testosterone     7. I understand that the medical effects and safety of testosterone are not fully understood, and that there may be long-term risks that are not yet known.     8. I understand that I am strongly advised not to take more testosterone than I am prescribed, as this increases health risks. I have been informed that taking more will not make masculinization happen more quickly or increase the degree of " change.     9. I understand that testosterone can cause changes that increase my risk of heart disease, including:     ? Decreasing good cholesterol (HDL) and increasing bad cholesterol (LDL)   ? Increasing blood pressure   ? Increasing deposits of fat around my internal organ    I have been advised that my risks of heart disease are greater if people in my family have had heart disease, if I am overweight, or if I smoke.   I have been advised that heart health checkups, including monitoring of my weight and cholesterol levels, should be done periodically as long as I am taking testosterone.     10. I understand that testosterone can damage the liver, possibly leading to liver disease. I have been advised that I should be monitored for as long as I am taking testosterone.     11. I understand that testosterone can increase the red blood cells and hemoglobin, and while the increase is usually only to a normal male range (which does not pose health risks), a high increase can cause potentially life-threatening problems such as stroke and heart attack. I have been advised that my blood should be monitored periodically while I am taking testosterone.     12. I understand that taking testosterone can increase my risk for diabetes by decreasing my body's response to insulin, causing weight gain, and increasing deposits of fat around my internal organs. I have been advised that my fasting blood glucose should be monitored periodically while I am taking testosterone.       13. I understand that it is not known if testosterone increases the risk of ovarian, breast, or uterine cancer.     14. I understand that taking testosterone can lead to my cervix and the walls of my vagina becoming more fragile, and that this can lead to tears or abrasions that increase the risk of sexually transmitted infections (including HIV) if I have vaginal sex - no matter what the gender of my partner is. I have been advised that sandra discussion  with my doctor about my sexual practices can help determine how best to prevent and monitor for sexually transmitted infections.     15. I have been informed that testosterone can cause headaches or migraines. I understand that if I am frequently having headaches or migraines, or the pain is unusually severe, it is recommended that I talk with my health care provider.     16. I understand that testosterone can cause emotional changes, including increased irritability, frustration, and anger. I have been advised that my doctor can assist me in finding resources to explore and cope with these changes.     17. I understand that testosterone will result in changes that will be noticeable by other people, and that some transgender people in similar circumstances have experienced harassment, discrimination, and violence, while others have lost support of loved ones. I have been advised that my doctor can assist me in finding advocacy and support resources.     Prevention of Medical Complications       18. I agree to take testosterone as prescribed and to tell my doctor if I am not happy with the treatment or am experiencing any problems.     19. I understand that the right dose or type of medication prescribed for me may not be the same as for someone else.     20. I understand that physical examinations and blood tests are needed on a regular basis to check for negative side effects of testosterone.     21. I understand that testosterone can interact with other medication (including other sources of hormones), dietary supplements, herbs, alcohol, and street drugs. I understand that being honest with my doctor about what else I am taking will help prevent medical complications that could be life-threatening. I have been informed that I will continue to get medical care no matter what information I share.     22. I understand that some medical conditions make it dangerous to take testosterone. I agree that I will be  checked for risky conditions before the decision to take testosterone is made.     23. I understand that I can choose to stop taking testosterone at any time, and that it is advised that I do this with the help of my doctor to make sure there are no negative reactions to stopping. I understand that my doctor may suggest I reduce or stop taking testosterone if there are severe side effects or health risks that can't be controlled.         My signature below confirms that:       ? My doctor has talked with me about the benefits and risks of testosterone, the possible or likely consequences of hormone therapy, and potential alternative treatment options.   ? I understand the risks that may be involved.   ? I understand that this form covers known effects and risks and that there may be long-term effects or risks that are not yet known  ? I have had sufficient opportunity to discuss treatment options with my doctor. All of my questions have been answered to my satisfaction.   ? I believe I have adequate knowledge on which to base informed consent to the provision of testosterone therapy.     Based on this:   _____ I wish to begin taking testosterone.     _____ I do not wish to begin taking testosterone at this time.         Whatever your current decision is, please talk with your doctor any time you have questions, concerns, or want to re-evaluate your options.         ____________________________________ __________________   Patient Signature     Date           ____________________________________ __________________   Prescribing clinician Signature    Date       Effects and Expected Time Course of Feminizing Hormones      Effect Expected Onset Expected Maximum Effect   Body Fat redistribution 3-6 months 2-5 years   Decreased Muscle mass 3-6 months 1-2 years   Softening of skin/decreased oiliness 3-6 months Unknown   Decreased Libido 1-3 months 1-2 years   Decreased Spontaneous Erections 1-3 months 3-6 months   Male  Sexual Dysfunction Variable Variable   Breast Growth 3-6 months 2-3 years   Decreased Testicular volume 3-6 months 2-3 years   Decreased sperm production Variable Variable   Thinning and slowed growth of body and facial hair  + 6-12 months >3 years   Male pattern baldness No regrowth, loss stops 1-3 months 1-2 years   +complete removal of male facial hair and body hair requires electrolysis, laser treatment or both    Effects and Expected Time Course of Masculinizing  Hormones      Effect Expected Onset Expected Maximum Effect   Skin oiliness/Acne 1-6 months 1-2 years   Facial/body hair growth 3-6 months 3-5 years    Scalp hair loss >12 months+ Variable   Increased muscle mass/strength 6-12 months 2-5 years   Body fat redistribution 3-6 months 2-5 years   Cessation of menses 2-6 months n/a   Clitoral enlargement 3-6 months 1-2 years   Vaginal atrophy 3-6 months 1-2 years   Deepened voice 3-12 months 1-2 years                   Follow-ups after your visit        Who to contact     Please call your clinic at [phone] to:    Ask questions about your health    Make or cancel appointments    Discuss your medicines    Learn about your test results    Speak to your doctor   If you have compliments or concerns about an experience at your clinic, or if you wish to file a complaint, please contact [facility] Patient Relations at [phone] or email us at [email or web address]         Additional Information About Your Visit        [employer] Information     [employer] is an electronic gateway that provides easy, online access to your medical records. With [employer], you can request a clinic appointment, read your test results, renew a prescription or communicate with your care team.     To sign up for [employer] visit the website at [email or web address]   You will be asked to enter the access code listed below, as well as some personal information. Please follow the directions to create  "your username and password.     Your access code is: [email or web address]  Expires: 2017  3:57 PM     Your access code will  in 90 days. If you need help or a new code, please contact your [facility] or call [phone] for assistance.        Care EveryWhere ID     This is your Care EveryWhere ID. This could be used by other organizations to access your [facility] medical records  [id number]        Your Vitals Were     Pulse Pulse Oximetry BMI (Body Mass Index)             72 98% 34.65 kg/m2          Blood Pressure from Last 3 Encounters:   17 105/69   17 110/72   17 103/70    Weight from Last 3 Encounters:   17 208 lb 3.2 oz (94.4 kg)   17 205 lb 6.4 oz (93.2 kg)   17 197 lb (89.4 kg)              We Performed the Following     CBC with Diff Plt     Glucose     Hepatic Panel     Lipid Cascade     Testosterone Total          Today's Medication Changes          These changes are accurate as of: 17  2:00 PM.  If you have any questions, ask your nurse or doctor.               Start taking these medicines.        Dose/Directions    BD SHARPS CONTAINER HOME Misc   Used for:  Gender dysphoria in adult   Started by:  Tory Dhillon MD        Dose:  1 Units   1 Units once for 1 dose   Quantity:  1 each   Refills:  0       needle (disp) 18G X 1\" Misc   Used for:  Gender dysphoria in adult   Started by:  Tory Dhillon MD        Use to draw up hormones once weekly   Quantity:  25 each   Refills:  3       Needle (Disp) 25G X 1-1/2\" Misc   Used for:  Gender dysphoria in adult   Started by:  Tory Dhillon MD        Use once weekly for administering hormone IM   Quantity:  25 each   Refills:  3       syringe (disposable) 1 ML Misc   Used for:  Gender dysphoria in adult   Started by:  Tory Dhillon MD        Use once weekly to draw up hormones   Quantity:  25 each   Refills:  3       testosterone cypionate 200 MG/ML injection " "  Commonly known as:  DEPOTESTOTERONE   Used for:  Gender dysphoria in adult   Started by:  Tory Dhillon MD        Dose:  25 mg   Inject 0.13 mLs (26 mg) into the muscle once a week   Quantity:  2 mL   Refills:  0            Where to get your medicines      These medications were sent to [facility] [place], [place] - [address], [facility] [address]     Phone:  [phone]     BD SHARPS CONTAINER HOME Misc    needle (disp) 18G X 1\" Misc    Needle (Disp) 25G X 1-1/2\" Misc    syringe (disposable) 1 ML Misc         Some of these will need a paper prescription and others can be bought over the counter.  Ask your nurse if you have questions.     Bring a paper prescription for each of these medications     testosterone cypionate 200 MG/ML injection                Primary Care Provider Office Phone # Fax #    Tory Dhillon [phone]       [facility] [address]  [facility] [address]        Equal Access to Services     DAVEY MIRELES AH: Hadii jillian ku hadasho Soomaali, waaxda luqadaha, qaybta kaalmada adeegyada, dejon sandoval hayshade slaughter . So [facility] [phone].    ATENCIÓN: Si habla español, tiene a madera disposición servicios gratuitos de asistencia lingüística. [facility] [phone].    We comply with applicable federal civil rights laws and [place] laws. We do not discriminate on the basis of race, color, national origin, age, disability sex, sexual orientation or gender identity.            Thank you!     Thank you for choosing hospitals [facility]  for your care. Our goal is always to provide you with excellent care. Hearing back from our patients is one way we can continue to improve our services. Please take a few minutes to complete the written survey that you may receive in the mail after your visit with us. Thank you!             Your Updated Medication List - Protect others around you: Learn how to " "safely use, store and throw away your medicines at www.disposemymeds.org.          This list is accurate as of: 8/11/17  2:00 PM.  Always use your most recent med list.                   Brand Name Dispense Instructions for use Diagnosis    BD SHARPS CONTAINER HOME Misc     1 each    1 Units once for 1 dose    Gender dysphoria in adult       gabapentin 100 MG capsule    NEURONTIN    1 capsule    Take 1 capsule (100 mg) by mouth 2 times daily 100-300mg at night        LATUDA 80 MG Tabs tablet   Generic drug:  lurasidone     1 tablet    Take 1 tablet (80 mg) by mouth daily        needle (disp) 18G X 1\" Misc     25 each    Use to draw up hormones once weekly    Gender dysphoria in adult       Needle (Disp) 25G X 1-1/2\" Misc     25 each    Use once weekly for administering hormone IM    Gender dysphoria in adult       syringe (disposable) 1 ML Misc     25 each    Use once weekly to draw up hormones    Gender dysphoria in adult       testosterone cypionate 200 MG/ML injection    DEPOTESTOTERONE    2 mL    Inject 0.13 mLs (26 mg) into the muscle once a week    Gender dysphoria in adult       TYLENOL PO      Take 325 mg by mouth every 4 hours as needed          "

## 2017-08-11 NOTE — PROGRESS NOTES
SHAMEKA Lee is a 36 year old individual that uses pronouns He/Him/His/Himself that presents today for follow up of:  masculinizing hormone therapy. Gender identity: Male    Mental Health: Therapist approval of testosterone and received via fax. He states that his psychiatrist with [facility], Dr. Paul faxed a letter that was not received. Sees therapist twice per week for trauma therapy and to control anger. Next appointment with psychiatrist is 8/14/17. His wife is having a baby in a few weeks. His wife is present in the room.    Any special concerns today? no current concerns    On hormones?  No  ---    Past Surgical History:   Procedure Laterality Date     BREAST SURGERY         Patient Active Problem List   Diagnosis     Bipolar 1 disorder (H)     Anxiety     Gender dysphoria in adult     PTSD (post-traumatic stress disorder)     H/O: attempted suicide     Methamphetamine abuse in remission       Current Outpatient Prescriptions   Medication Sig Dispense Refill     lurasidone (LATUDA) 80 MG TABS tablet Take 1 tablet (80 mg) by mouth daily 1 tablet 0     gabapentin (NEURONTIN) 100 MG capsule Take 1 capsule (100 mg) by mouth 2 times daily 100-300mg at night 1 capsule 0     Acetaminophen (TYLENOL PO) Take 325 mg by mouth every 4 hours as needed          History   Smoking Status     Current Every Day Smoker     Packs/day: 0.50     Years: 21.00   Smokeless Tobacco     Not on file     Comment: using nicotine inhaler which helps   No alcohol use or drug use        Allergies   Allergen Reactions     Nickel Itching     Seroquel [Quetiapine] Other (See Comments)     Restless leg syndrom     Problem, Medication and Allergy Lists were reviewed and updated if needed..         Review of Systems:   Denies Headache, dizziness, depression, anxiety, nausea, vomiting, abdominal pain, shortness of breath, or chest pain.        Physical Exam:     Vitals:    08/11/17 1320   BP: 105/69   Pulse: 72   SpO2: 98%    Weight: 208 lb 3.2 oz (94.4 kg)     BMI= Body mass index is 34.65 kg/(m^2).   Wt Readings from Last 10 Encounters:   07/21/17 205 lb 6.4 oz (93.2 kg)   06/23/17 197 lb (89.4 kg)     Appearance: Male appearance and dress    GENERAL:: healthy, alert and no distress  EYES: Eyes grossly normal to inspection, PERRL  HENT: ear canals normal, Mouth- no ulcers, no lesions  NECK: no adenopathy, no asymmetry, no masses,  and thyroid normal to palpation, supple  RESP: lungs clear to auscultation - no rales, no rhonchi, no wheezes  CV: regular rates and rhythm, normal S1 S2, no S3 or S4 and no murmur, no click or rub -  MS: extremities normal- no gross deformities noted, no edema  SKIN: no suspicious lesions, no rashes  Psych: Alert and oriented times 3; coherent speech, normal rate and volume, able to articulate logical thoughts, able to abstract reason, no tangential thoughts, no hallucinations or delusions. Affect is appropriate.            Labs:      Results from the last 24 hours  Results for orders placed or performed in visit on 08/11/17 (from the past 24 hour(s))   CBC with Diff Plt ([facility])   Result Value Ref Range    WBC 12.3 (H) 4.0 - 11.0 K/uL    Lymphocytes # 4.6 0.8 - 5.3 K/uL    % Lymphocytes 37.7 20.0 - 48.0 %L    Mid # 1.1 0.0 - 2.2 K/uL    Mid % 8.7 0.0 - 20.0 %M    GRANULOCYTES # 6.6 1.6 - 8.3 K/uL    % Granulocytes 53.6 40.0 - 75.0 %G    RBC 4.15 3.80 - 5.20 M/uL    Hemoglobin 12.9 11.7 - 15.7 g/dL    Hematocrit 40.3 35.0 - 47.0 %    MCV 97.2 78.0 - 100.0 fL    MCH 31.1 26.5 - 35.0 pg    MCHC 32.0 32.0 - 36.0 g/dL    Platelets 142.0 (L) 150.0 - 450.0 K/uL   Glucose Casual ([facility])   Result Value Ref Range    Glucose Casual 91.0 51.0 - 200.0 mg/dL       Assessment and Plan     Jesus was seen today for hormone therapy.    Diagnoses and all orders for this visit:    Gender dysphoria in adult   Will route note to psychiatrist at [facility] and encouraged patient to bring letter of psychiatrist  "approval to next appointment.  -     testosterone cypionate (DEPOTESTOTERONE) 200 MG/ML injection; Inject 0.13 mLs (26 mg) into the muscle once a week  -     Needle, Disp, 25G X 1-1/2\" MISC; Use once weekly for administering hormone IM  -     needle, disp, 18G X 1\" MISC; Use to draw up hormones once weekly  -     syringe, disposable, 1 ML MISC; Use once weekly to draw up hormones  -     BD SHARPS CONTAINER HOME MISC; 1 Units once for 1 dose  -     Testosterone Total  -     Lipid panel  -     CBC with Diff Plt ([facility])  -     Glucose Casual ([facility])  -     Hepatic panel    Contraception:   not needed    Counselled patient about controlled substances: Yes.  Follow up:  Follow up in 1 month.  Results by mychart  Questions were elicited and answered.     Tory Dhillon MD    "

## 2017-08-11 NOTE — LETTER
8/11/2017      RE: Jesus Hurd  [address] [place] [address]     Dear Psychiatrist of the patient above (Dr. Paul),      Please see my note below regarding Jesus Hurd. He is currently being treated for gender dysphoria with testosterone. Please provide the patient with a letter of your approval and let us know if you have any concerns. Thank you.    Tory Dhillon, DO  [facility] Medicine PGY2              HPI   Jesus is a 36 year old individual that uses pronouns He/Him/His/Himself that presents today for follow up of:  masculinizing hormone therapy. Gender identity: Male    Mental Health: Therapist approval of testosterone and received via fax. He states that his psychiatrist with [facility], Dr. Paul faxed a letter that was not received. Sees therapist twice per week for trauma therapy and to control anger. Next appointment with psychiatrist is 8/14/17. His wife is having a baby in a few weeks. His wife is present in the room.    Any special concerns today? no current concerns    On hormones?  No  ---    Past Surgical History:   Procedure Laterality Date     BREAST SURGERY         Patient Active Problem List   Diagnosis     Bipolar 1 disorder (H)     Anxiety     Gender dysphoria in adult     PTSD (post-traumatic stress disorder)     H/O: attempted suicide     Methamphetamine abuse in remission       Current Outpatient Prescriptions   Medication Sig Dispense Refill     lurasidone (LATUDA) 80 MG TABS tablet Take 1 tablet (80 mg) by mouth daily 1 tablet 0     gabapentin (NEURONTIN) 100 MG capsule Take 1 capsule (100 mg) by mouth 2 times daily 100-300mg at night 1 capsule 0     Acetaminophen (TYLENOL PO) Take 325 mg by mouth every 4 hours as needed          History   Smoking Status     Current Every Day Smoker     Packs/day: 0.50     Years: 21.00   Smokeless Tobacco     Not on file     Comment: using nicotine inhaler which helps   No alcohol use or drug  use        Allergies   Allergen Reactions     Nickel Itching     Seroquel [Quetiapine] Other (See Comments)     Restless leg syndrom     Problem, Medication and Allergy Lists were reviewed and updated if needed..         Review of Systems:   Denies Headache, dizziness, depression, anxiety, nausea, vomiting, abdominal pain, shortness of breath, or chest pain.        Physical Exam:     Vitals:    08/11/17 1320   BP: 105/69   Pulse: 72   SpO2: 98%   Weight: 208 lb 3.2 oz (94.4 kg)     BMI= Body mass index is 34.65 kg/(m^2).   Wt Readings from Last 10 Encounters:   07/21/17 205 lb 6.4 oz (93.2 kg)   06/23/17 197 lb (89.4 kg)     Appearance: Male appearance and dress    GENERAL:: healthy, alert and no distress  EYES: Eyes grossly normal to inspection, PERRL  HENT: ear canals normal, Mouth- no ulcers, no lesions  NECK: no adenopathy, no asymmetry, no masses,  and thyroid normal to palpation, supple  RESP: lungs clear to auscultation - no rales, no rhonchi, no wheezes  CV: regular rates and rhythm, normal S1 S2, no S3 or S4 and no murmur, no click or rub -  MS: extremities normal- no gross deformities noted, no edema  SKIN: no suspicious lesions, no rashes  Psych: Alert and oriented times 3; coherent speech, normal rate and volume, able to articulate logical thoughts, able to abstract reason, no tangential thoughts, no hallucinations or delusions. Affect is appropriate.            Labs:      Results from the last 24 hours  Results for orders placed or performed in visit on 08/11/17 (from the past 24 hour(s))   CBC with Diff Plt ([facility])   Result Value Ref Range    WBC 12.3 (H) 4.0 - 11.0 K/uL    Lymphocytes # 4.6 0.8 - 5.3 K/uL    % Lymphocytes 37.7 20.0 - 48.0 %L    Mid # 1.1 0.0 - 2.2 K/uL    Mid % 8.7 0.0 - 20.0 %M    GRANULOCYTES # 6.6 1.6 - 8.3 K/uL    % Granulocytes 53.6 40.0 - 75.0 %G    RBC 4.15 3.80 - 5.20 M/uL    Hemoglobin 12.9 11.7 - 15.7 g/dL    Hematocrit 40.3 35.0 - 47.0 %    MCV 97.2 78.0 - 100.0 fL     "MCH 31.1 26.5 - 35.0 pg    MCHC 32.0 32.0 - 36.0 g/dL    Platelets 142.0 (L) 150.0 - 450.0 K/uL   Glucose Casual ([facility])   Result Value Ref Range    Glucose Casual 91.0 51.0 - 200.0 mg/dL       Assessment and Plan     Jesus was seen today for hormone therapy.    Diagnoses and all orders for this visit:    Gender dysphoria in adult   Will route note to psychiatrist at [facility] and encouraged patient to bring letter of psychiatrist approval to next appointment.  -     testosterone cypionate (DEPOTESTOTERONE) 200 MG/ML injection; Inject 0.13 mLs (26 mg) into the muscle once a week  -     Needle, Disp, 25G X 1-1/2\" MISC; Use once weekly for administering hormone IM  -     needle, disp, 18G X 1\" MISC; Use to draw up hormones once weekly  -     syringe, disposable, 1 ML MISC; Use once weekly to draw up hormones  -     BD SHARPS CONTAINER HOME MISC; 1 Units once for 1 dose  -     Testosterone Total  -     Lipid panel  -     CBC with Diff Plt ([facility])  -     Glucose Casual ([facility])  -     Hepatic panel    Contraception:   not needed    Counselled patient about controlled substances: Yes.  Follow up:  Follow up in 1 month.  Results by mejiat  Questions were elicited and answered.     Tory Dhillon MD    "

## 2017-08-14 ENCOUNTER — ALLIED HEALTH/NURSE VISIT (OUTPATIENT)
Dept: FAMILY MEDICINE | Facility: CLINIC | Age: 37
End: 2017-08-14

## 2017-08-14 DIAGNOSIS — F64.0 GENDER DYSPHORIA IN ADULT: Primary | ICD-10-CM

## 2017-08-14 NOTE — MR AVS SNAPSHOT
After Visit Summary   2017    Jesus Hurd    MRN: [id number]           Patient Information     Date Of Birth          1980        Visit Information        Provider Department      2017 2:20 [facility]        Today's Diagnoses     Gender dysphoria in adult    -  1       Follow-ups after your visit        Your next 10 appointments already scheduled     Sep 20, 2017  1:00 PM CDT   Return Visit with [facility] ([facility])    [address],  [address]  [place] [address]   [phone]              Who to contact     Please call your clinic at [phone] to:    Ask questions about your health    Make or cancel appointments    Discuss your medicines    Learn about your test results    Speak to your doctor   If you have compliments or concerns about an experience at your clinic, or if you wish to file a complaint, please contact [facility] Patient Relations at [phone] or email us at [email or web address]         Additional Information About Your Visit        MyChart Information     Supersolid is an electronic gateway that provides easy, online access to your medical records. With Supersolid, you can request a clinic appointment, read your test results, renew a prescription or communicate with your care team.     To sign up for [employer] visit the website at [email or web address]   You will be asked to enter the access code listed below, as well as some personal information. Please follow the directions to create your username and password.     Your access code is: [email or web address]  Expires: 2017  3:57 PM     Your access code will  in 90 days. If you need help or a new code, please contact your [facility] or call [phone] for assistance.        Care EveryWhere ID     This is your Care EveryWhere ID.  This could be used by other organizations to access your [facility] medical records  [id number]         Blood Pressure from Last 3 Encounters:   08/11/17 105/69   07/21/17 110/72   06/23/17 103/70    Weight from Last 3 Encounters:   08/11/17 208 lb 3.2 oz (94.4 kg)   07/21/17 205 lb 6.4 oz (93.2 kg)   06/23/17 197 lb (89.4 kg)              Today, you had the following     No orders found for display       Primary Care Provider Office Phone # Fax #    Tory Dhillon [phone]       [facility] [address] [address]        Equal Access to Services     DAVEY MIRELES : Hadii jillian powerso Sogil, waaxda luqadaha, qaybta kaalmada adeegyada, dejon slaughter . So [facility] [place] [phone].    ATENCIÓN: Si habla español, tiene a madera disposición servicios gratuitos de asistencia lingüística. [facility] [phone].    We comply with applicable federal civil rights laws and [place] laws. We do not discriminate on the basis of race, color, national origin, age, disability sex, sexual orientation or gender identity.            Thank you!     Thank you for choosing [facility]  for your care. Our goal is always to provide you with excellent care. Hearing back from our patients is one way we can continue to improve our services. Please take a few minutes to complete the written survey that you may receive in the mail after your visit with us. Thank you!             Your Updated Medication List - Protect others around you: Learn how to safely use, store and throw away your medicines at www.disposemymeds.org.          This list is accurate as of: 8/14/17  2:47 PM.  Always use your most recent med list.                   Brand Name Dispense Instructions for use Diagnosis    gabapentin 100 MG capsule    NEURONTIN    1 capsule    Take 1 capsule (100 mg) by mouth 2 times daily 100-300mg at night        LATUDA 80 MG Tabs tablet   Generic  "drug:  lurasidone     1 tablet    Take 1 tablet (80 mg) by mouth daily        needle (disp) 18G X 1\" Misc     25 each    Use to draw up hormones once weekly    Gender dysphoria in adult       Needle (Disp) 25G X 1-1/2\" Misc     25 each    Use once weekly for administering hormone IM    Gender dysphoria in adult       syringe (disposable) 1 ML Misc     25 each    Use once weekly to draw up hormones    Gender dysphoria in adult       testosterone cypionate 200 MG/ML injection    DEPOTESTOTERONE    2 mL    Inject 0.13 mLs (26 mg) into the muscle once a week    Gender dysphoria in adult       TYLENOL PO      Take 325 mg by mouth every 4 hours as needed          "

## 2017-08-14 NOTE — PROGRESS NOTES
Preceptor Attestation:   Patient seen and discussed with the resident. Assessment and plan reviewed with resident and agreed upon.   Supervising Physician:  Derek Arredondo MD  [facility]

## 2017-08-14 NOTE — NURSING NOTE
"Patient presented to clinic with all supplies for IM injection teaching. Patient's partner was present at visit.     RN reviewed necessary supplies: difference in needles (drawing up vs injecting), how to read a syringe, how to read medication label, disposal of sharps, and proper hand hygiene.     Discussed how to switch out needles and patient demonstrated understanding of reading syringe. RN reviewed safe needle handling (using \"scoop\" method). Patient independently eva up proper amount of Testosterone.     RN discussed site for IM injection and reviewed proper IM injection technique. Patient practiced using injecting pad.    At end of visit, patient independently completed self-injection of 0.13 mL (26 mg) Testosterone into left thigh under supervision of RN.    Completed visit with discussion of refill policy.     Patient verbalized understanding and was engaged during appointment.    Dr. RICH Galeana was the preceptor on site for this visit and available for questions.    Amanda Joy RN      "

## 2017-08-15 LAB — TESTOST SERPL-MCNC: 40 NG/DL (ref 8–60)

## 2017-08-15 ASSESSMENT — ANXIETY QUESTIONNAIRES
5. BEING SO RESTLESS THAT IT IS HARD TO SIT STILL: NOT AT ALL
2. NOT BEING ABLE TO STOP OR CONTROL WORRYING: NOT AT ALL
7. FEELING AFRAID AS IF SOMETHING AWFUL MIGHT HAPPEN: NOT AT ALL
1. FEELING NERVOUS, ANXIOUS, OR ON EDGE: MORE THAN HALF THE DAYS
3. WORRYING TOO MUCH ABOUT DIFFERENT THINGS: NOT AT ALL
IF YOU CHECKED OFF ANY PROBLEMS ON THIS QUESTIONNAIRE, HOW DIFFICULT HAVE THESE PROBLEMS MADE IT FOR YOU TO DO YOUR WORK, TAKE CARE OF THINGS AT HOME, OR GET ALONG WITH OTHER PEOPLE: SOMEWHAT DIFFICULT
6. BECOMING EASILY ANNOYED OR IRRITABLE: NOT AT ALL
GAD7 TOTAL SCORE: 4

## 2017-08-15 ASSESSMENT — PATIENT HEALTH QUESTIONNAIRE - PHQ9
SUM OF ALL RESPONSES TO PHQ QUESTIONS 1-9: 10
5. POOR APPETITE OR OVEREATING: MORE THAN HALF THE DAYS

## 2017-08-16 ASSESSMENT — ANXIETY QUESTIONNAIRES: GAD7 TOTAL SCORE: 4

## 2017-08-18 DIAGNOSIS — F64.0 GENDER DYSPHORIA IN ADULT: ICD-10-CM

## 2017-08-18 NOTE — TELEPHONE ENCOUNTER
Refill request received via fax from patient's pharmacy. Refilled per standing protocol. Sent to patient's preferred pharmacy.    Supplies refilled as previously written.    Caitlyn Celeste RN

## 2017-08-31 ENCOUNTER — TELEPHONE (OUTPATIENT)
Dept: FAMILY MEDICINE | Facility: CLINIC | Age: 37
End: 2017-08-31

## 2017-08-31 DIAGNOSIS — F64.0 GENDER DYSPHORIA IN ADULT: ICD-10-CM

## 2017-08-31 NOTE — TELEPHONE ENCOUNTER
Request for medication refill:    Date of last visit at clinic: 8-11-17    Please complete refill if appropriate and CLOSE ENCOUNTER.    Closing the encounter signifies the refill is complete.    If refill has been denied, please complete the smart phrase .smirefuse and route it to the [facility] RN TRIAGE pool to inform the patient and the pharmacy.    Savita Cook MA

## 2017-08-31 NOTE — TELEPHONE ENCOUNTER
Medication Refill Denied  Reason: Patient needs: provider visit  Provider: I have not called the patient about the Rx denial, please call.  PCS: Please notify the pharmacy  RN: Please contact the patient to explain reasoning provided above and to schedule the patient for a provider visit with any provider..    RN may order temporary refill so that the patient will not run out of medication prior to the scheduled visit.    Tory Dhillon MD

## 2017-09-01 RX ORDER — TESTOSTERONE CYPIONATE 200 MG/ML
25 INJECTION, SOLUTION INTRAMUSCULAR WEEKLY
Qty: 0.13 ML | Refills: 0 | Status: SHIPPED | OUTPATIENT
Start: 2017-09-01 | End: 2017-09-07

## 2017-09-01 NOTE — TELEPHONE ENCOUNTER
RN called patient to relay message per PCP. RN transferred to  to schedule appt. Pt stated his shot day is Monday. RN stated she will send a temporary supply as instructed by PCP and send it to preferred pharmacy at [facility] [place] [place]. Appt made for 9/7/17    RN sent supply of 0.13ml for 1 dose for shot day on Monday, had preceptor Dr Worthington sign and faxed to pharmacy. Fax successful    Caitlyn Celeste RN

## 2017-09-03 ENCOUNTER — HEALTH MAINTENANCE LETTER (OUTPATIENT)
Age: 37
End: 2017-09-03

## 2017-09-07 ENCOUNTER — OFFICE VISIT (OUTPATIENT)
Dept: FAMILY MEDICINE | Facility: CLINIC | Age: 37
End: 2017-09-07

## 2017-09-07 VITALS
OXYGEN SATURATION: 100 % | DIASTOLIC BLOOD PRESSURE: 73 MMHG | WEIGHT: 212.6 LBS | RESPIRATION RATE: 16 BRPM | BODY MASS INDEX: 35.38 KG/M2 | HEART RATE: 70 BPM | TEMPERATURE: 97.6 F | SYSTOLIC BLOOD PRESSURE: 106 MMHG

## 2017-09-07 DIAGNOSIS — F64.0 GENDER DYSPHORIA IN ADULT: ICD-10-CM

## 2017-09-07 LAB
% GRANULOCYTES: 66.3 %G (ref 40–75)
ALBUMIN SERPL-MCNC: 4.2 MG/DL (ref 3.8–5)
ALP SERPL-CCNC: 75.7 U/L (ref 31.7–110.5)
ALT SERPL-CCNC: 11.1 U/L (ref 0–45)
AST SERPL-CCNC: 12.8 U/L (ref 0–45)
BILIRUB SERPL-MCNC: <0.4 MG/DL (ref 0.2–1.3)
BUN SERPL-MCNC: 7.4 MG/DL (ref 7–19)
CALCIUM SERPL-MCNC: 9.8 MG/DL (ref 8.5–10.1)
CHLORIDE SERPLBLD-SCNC: 100.6 MMOL/L (ref 98–110)
CHOLEST SERPL-MCNC: 203.1 MG/DL (ref 0–200)
CHOLEST/HDLC SERPL: 4.5 {RATIO} (ref 0–5)
CO2 SERPL-SCNC: 30.2 MMOL/L (ref 20–32)
CREAT SERPL-MCNC: 0.7 MG/DL (ref 0.5–1)
GFR SERPL CREATININE-BSD FRML MDRD: >90 ML/MIN/1.7 M2
GLUCOSE SERPL-MCNC: 92.5 MG'DL (ref 70–99)
GRANULOCYTES #: 5.2 K/UL (ref 1.6–8.3)
HCT VFR BLD AUTO: 40.5 % (ref 35–47)
HDLC SERPL-MCNC: 44.7 MG/DL
HEMOGLOBIN: 12.8 G/DL (ref 11.7–15.7)
LDLC SERPL CALC-MCNC: 134 MG/DL (ref 0–129)
LYMPHOCYTES # BLD AUTO: 2 K/UL (ref 0.8–5.3)
LYMPHOCYTES NFR BLD AUTO: 25 %L (ref 20–48)
MCH RBC QN AUTO: 30.8 PG (ref 26.5–35)
MCHC RBC AUTO-ENTMCNC: 31.6 G/DL (ref 32–36)
MCV RBC AUTO: 97.7 FL (ref 78–100)
MID #: 0.7 K/UL (ref 0–2.2)
MID %: 8.7 %M (ref 0–20)
PLATELET # BLD AUTO: 339 K/UL (ref 150–450)
POTASSIUM SERPL-SCNC: 4.2 MMOL/DL (ref 3.3–4.5)
PROT SERPL-MCNC: 7.4 G/DL (ref 6.8–8.8)
RBC # BLD AUTO: 4.15 M/UL (ref 3.8–5.2)
SODIUM SERPL-SCNC: 136.6 MMOL/L (ref 132.6–141.4)
TRIGL SERPL-MCNC: 122.4 MG/DL (ref 0–150)
VLDL CHOLESTEROL: 24.5 MG/DL (ref 7–32)
WBC # BLD AUTO: 7.9 K/UL (ref 4–11)

## 2017-09-07 RX ORDER — TESTOSTERONE CYPIONATE 200 MG/ML
52 INJECTION, SOLUTION INTRAMUSCULAR WEEKLY
Qty: 1 ML | Refills: 1 | Status: SHIPPED | OUTPATIENT
Start: 2017-09-07 | End: 2017-11-10

## 2017-09-07 NOTE — PATIENT INSTRUCTIONS
Here is the plan from today's visit    1. Gender dysphoria in adult  Increase to .26 ml once per week    Follow up plan  Please make a clinic appointment for follow up with your primary physician Tory Dhillon in 1 month for recheck HRT.       Effects and Expected Time Course of Masculinizing  Hormones        Effect Expected Onset Expected Maximum Effect   Skin oiliness/Acne 1-6 months 1-2 years   Facial/body hair growth 3-6 months 3-5 years    Scalp hair loss >12 months+ Variable   Increased muscle mass/strength 6-12 months 2-5 years   Body fat redistribution 3-6 months 2-5 years   Cessation of menses 2-6 months n/a   Clitoral enlargement 3-6 months 1-2 years   Vaginal atrophy 3-6 months 1-2 years   Deepened voice 3-12 months 1-2 years         Thank you for coming to [facility] today.  Lab Testing:  **If you had lab testing today and your results are reassuring or normal they will be mailed to you or sent through [employer] within 7 days.   **If the lab tests need quick action we will call you with the results.  The phone number we will call with results is # [phone] (home) . If this is not the best number please call our clinic and change the number.  Medication Refills:  If you need any refills please call your pharmacy and they will contact us.   If you need to  your refill at a new pharmacy, please contact the new pharmacy directly. The new pharmacy will help you get your medications transferred faster.   Scheduling:  If you have any concerns about today's visit or wish to schedule another appointment please call our office during normal business hours [phone] (8-5:00 M-F)  If a referral was made to a [facility] and you don't get a call from central scheduling please call [phone].  If a Mammogram was ordered for you at [facility] call [phone] to schedule or change your appointment.  If you had an XRay/CT/Ultrasound/MRI ordered the number is  [phone] to schedule or change your radiology appointment.   Medical Concerns:  If you have urgent medical concerns please call [phone] at any time of the day.

## 2017-09-07 NOTE — LETTER
September 12, 2017      Jesus Hurd  [address]  [place] [address]        Dear Jesus,    Thank you for getting your care at [facility]. Please see below for your test results.    Resulted Orders   Testosterone total   Result Value Ref Range    Testosterone Total 226 (H) 8 - 60 ng/dL      Comment:      This test was developed and its performance characteristics determined by the   [facility],  Special [facility] Laboratory. It has   not been cleared or approved by the FDA. The laboratory is regulated under   CLIA as qualified to perform high-complexity testing. This test is used for   clinical purposes. It should not be regarded as investigational or for   research.     Comprehensive Metabolic Panel ([facility])   Result Value Ref Range    Albumin 4.2 3.8 - 5.0 mg/dL    Alkaline Phosphatase 75.7 31.7 - 110.5 U/L    ALT 11.1 0.0 - 45.0 U/L    AST 12.8 0.0 - 45.0 U/L    Bilirubin Total <0.4 0.2 - 1.3 mg/dL    Urea Nitrogen 7.4 7.0 - 19.0 mg/dL    Calcium 9.8 8.5 - 10.1 mg/dL    Chloride 100.6 98.0 - 110.0 mmol/L    Carbon Dioxide 30.2 20.0 - 32.0 mmol/L    Creatinine 0.7 0.5 - 1.0 mg/dL    Glucose 92.5 70.0 - 99.0 mg'dL    Potassium 4.2 3.3 - 4.5 mmol/dL    Sodium 136.6 132.6 - 141.4 mmol/L    Protein Total 7.4 6.8 - 8.8 g/dL    GFR Estimate >90 >60.0 mL/min/1.7 m2    GFR Estimate If Black >90 >60.0 mL/min/1.7 m2   CBC with Diff Plt ([facility])   Result Value Ref Range    WBC 7.9 4.0 - 11.0 K/uL    Lymphocytes # 2.0 0.8 - 5.3 K/uL    % Lymphocytes 25.0 20.0 - 48.0 %L    Mid # 0.7 0.0 - 2.2 K/uL    Mid % 8.7 0.0 - 20.0 %M    GRANULOCYTES # 5.2 1.6 - 8.3 K/uL    % Granulocytes 66.3 40.0 - 75.0 %G    RBC 4.15 3.80 - 5.20 M/uL    Hemoglobin 12.8 11.7 - 15.7 g/dL    Hematocrit 40.5 35.0 - 47.0 %    MCV 97.7 78.0 - 100.0 fL    MCH 30.8 26.5 - 35.0 pg    MCHC 31.6 (L) 32.0 - 36.0 g/dL    Platelets 339.0 150.0 - 450.0 K/uL   Lipid Panel ([facility])   Result Value Ref Range     Cholesterol 203.1 (H) 0.0 - 200.0 mg/dL    Cholesterol/HDL Ratio 4.5 0.0 - 5.0    HDL Cholesterol 44.7 >40.0 mg/dL    LDL Cholesterol Calculated 134 (H) 0 - 129 mg/dL    Triglycerides 122.4 0.0 - 150.0 mg/dL    VLDL Cholesterol 24.5 7.0 - 32.0 mg/dL       Your labs have come back consistent with what we would expect.  Continue with the current dosage and follow up as we discussed.    Sincerely,    Vladimir Michael MD

## 2017-09-07 NOTE — MR AVS SNAPSHOT
After Visit Summary   9/7/2017    Jesus Hurd    MRN: [id number]           Patient Information     Date Of Birth          1980        Visit Information        Provider Department      9/7/2017 7:50 AM Vladimir Michael MD \[facility]\"" [facility]        Today's Diagnoses     Gender dysphoria in adult          Care Instructions    Here is the plan from today's visit    1. Gender dysphoria in adult  Increase to .26 ml once per week    Follow up plan  Please make a clinic appointment for follow up with your primary physician Tory Dhillon in 1 month for recheck HRT.       Effects and Expected Time Course of Masculinizing  Hormones        Effect Expected Onset Expected Maximum Effect   Skin oiliness/Acne 1-6 months 1-2 years   Facial/body hair growth 3-6 months 3-5 years    Scalp hair loss >12 months+ Variable   Increased muscle mass/strength 6-12 months 2-5 years   Body fat redistribution 3-6 months 2-5 years   Cessation of menses 2-6 months n/a   Clitoral enlargement 3-6 months 1-2 years   Vaginal atrophy 3-6 months 1-2 years   Deepened voice 3-12 months 1-2 years         Thank you for coming to [facility] today.  Lab Testing:  **If you had lab testing today and your results are reassuring or normal they will be mailed to you or sent through [employer] within 7 days.   **If the lab tests need quick action we will call you with the results.  The phone number we will call with results is # [phone] (home) . If this is not the best number please call our clinic and change the number.  Medication Refills:  If you need any refills please call your pharmacy and they will contact us.   If you need to  your refill at a new pharmacy, please contact the new pharmacy directly. The new pharmacy will help you get your medications transferred faster.   Scheduling:  If you have any concerns about today's visit or wish to schedule another appointment please call our office during  normal business hours [phone] (8-5:00 M-F)  If a referral was made to a [facility] and you don't get a call from central scheduling please call [phone].  If a Mammogram was ordered for you at [facility] call [phone] to schedule or change your appointment.  If you had an XRay/CT/Ultrasound/MRI ordered the number is [phone] to schedule or change your radiology appointment.   Medical Concerns:  If you have urgent medical concerns please call [phone] at any time of the day.            Follow-ups after your visit        Your next 10 appointments already scheduled     Sep 20, 2017  1:00 PM CDT   Return Visit with Tory Dhillon MD   [facility] ([facility])    [facility] [place],  [address]  [place] [address]   [phone]              Who to contact     Please call your clinic at [phone] to:    Ask questions about your health    Make or cancel appointments    Discuss your medicines    Learn about your test results    Speak to your doctor   If you have compliments or concerns about an experience at your clinic, or if you wish to file a complaint, please contact [facility] Patient Relations at [phone] or email us at [email or web address]         Additional Information About Your Visit        Melbosshart Information     [employer] is an electronic gateway that provides easy, online access to your medical records. With [employer], you can request a clinic appointment, read your test results, renew a prescription or communicate with your care team.     To sign up for [employer] visit the website at [email or web address]   You will be asked to enter the access code listed below, as well as some personal information. Please follow the directions to create your username and password.     Your access code is: [email or web address]  Expires: 2017  3:57 PM     Your access code will  in 90 days.  If you need help or a new code, please contact your [facility] or call [phone] for assistance.        Care EveryWhere ID     This is your Care EveryWhere ID. This could be used by other organizations to access your [facility] medical records  [id number]        Your Vitals Were     Pulse Temperature Respirations Last Period Pulse Oximetry Breastfeeding?    70 97.6  F (36.4  C) (Oral) 16 09/01/2017 100% No    BMI (Body Mass Index)                   35.38 kg/m2            Blood Pressure from Last 3 Encounters:   09/07/17 106/73   08/11/17 105/69   07/21/17 110/72    Weight from Last 3 Encounters:   09/07/17 212 lb 9.6 oz (96.4 kg)   08/11/17 208 lb 3.2 oz (94.4 kg)   07/21/17 205 lb 6.4 oz (93.2 kg)              We Performed the Following     CBC with Diff Plt ([facility])     Comprehensive Metabolic Panel ([facility])     Lipid Panel ([facility])     Testosterone total          Today's Medication Changes          These changes are accurate as of: 9/7/17  8:16 AM.  If you have any questions, ask your nurse or doctor.               These medicines have changed or have updated prescriptions.        Dose/Directions    testosterone cypionate 200 MG/ML injection   Commonly known as:  DEPOTESTOTERONE   This may have changed:  how much to take   Used for:  Gender dysphoria in adult   Changed by:  Vladimir Michael MD        Dose:  52 mg   Inject 0.26 mLs (52 mg) into the muscle once a week   Quantity:  1 mL   Refills:  1            Where to get your medicines      Some of these will need a paper prescription and others can be bought over the counter.  Ask your nurse if you have questions.     Bring a paper prescription for each of these medications     testosterone cypionate 200 MG/ML injection                Primary Care Provider Office Phone # Fax #    Tory Dhillon [phone]       [facility] [address] [place] [id number]        Equal Access  "to Services     [facility] ALINE : Kristi Cox, waaxda luqadaha, qaybta kaalmadejon barroso. So [facility] [phone].    ATENCIÓN: Si jeraldla aleksandr, tiene a madera disposición servicios gratuitos de asistencia lingüística. Llame al [phone].    We comply with applicable federal civil rights laws and [place] laws. We do not discriminate on the basis of race, color, national origin, age, disability sex, sexual orientation or gender identity.            Thank you!     Thank you for choosing [facility]  for your care. Our goal is always to provide you with excellent care. Hearing back from our patients is one way we can continue to improve our services. Please take a few minutes to complete the written survey that you may receive in the mail after your visit with us. Thank you!             Your Updated Medication List - Protect others around you: Learn how to safely use, store and throw away your medicines at www.disposemymeds.org.          This list is accurate as of: 9/7/17  8:16 AM.  Always use your most recent med list.                   Brand Name Dispense Instructions for use Diagnosis    ARIPiprazole 9.75 MG/1.3ML injection    ABILIFY     Inject 10 mg into the muscle once        gabapentin 100 MG capsule    NEURONTIN    1 capsule    Take 1 capsule (100 mg) by mouth 2 times daily 100-300mg at night        LATUDA 80 MG Tabs tablet   Generic drug:  lurasidone     1 tablet    Take 1 tablet (80 mg) by mouth daily        needle (disp) 18G X 1\" Misc     25 each    Use to draw up hormones once weekly    Gender dysphoria in adult       Needle (Disp) 25G X 1-1/2\" Misc     25 each    Use once weekly for administering hormone IM    Gender dysphoria in adult       syringe (disposable) 1 ML Misc     25 each    Use once weekly to draw up hormones    Gender dysphoria in adult       testosterone cypionate 200 MG/ML injection    DEPOTESTOTERONE    1 mL    " Inject 0.26 mLs (52 mg) into the muscle once a week    Gender dysphoria in adult       TYLENOL PO      Take 325 mg by mouth every 4 hours as needed

## 2017-09-07 NOTE — PROGRESS NOTES
"          SHAMEKA Lee is a 36 year old individual that uses pronouns He/Him/His/Himself that presents today for follow up of:  masculinizing hormone therapy. Gender identity: Male    Any special concerns today?  Here for 1 month follow up after starting testosterone     On hormones?  YES +++ Shot day of the week? Monday      Due for labs?  Yes      +++ Refills of meds needed?  Yes  ---    Past Surgical History:   Procedure Laterality Date     BREAST SURGERY         Patient Active Problem List   Diagnosis     Bipolar 1 disorder (H)     Anxiety     Gender dysphoria in adult     PTSD (post-traumatic stress disorder)     H/O: attempted suicide     Methamphetamine abuse in remission       Current Outpatient Prescriptions   Medication Sig Dispense Refill     ARIPiprazole (ABILIFY) 9.75 MG/1.3ML injection Inject 10 mg into the muscle once       testosterone cypionate (DEPOTESTOTERONE) 200 MG/ML injection Inject 0.13 mLs (26 mg) into the muscle once a week 0.13 mL 0     Needle, Disp, 25G X 1-1/2\" MISC Use once weekly for administering hormone IM 25 each 3     syringe, disposable, 1 ML MISC Use once weekly to draw up hormones 25 each 3     needle, disp, 18G X 1\" MISC Use to draw up hormones once weekly 25 each 3     lurasidone (LATUDA) 80 MG TABS tablet Take 1 tablet (80 mg) by mouth daily 1 tablet 0     gabapentin (NEURONTIN) 100 MG capsule Take 1 capsule (100 mg) by mouth 2 times daily 100-300mg at night 1 capsule 0     Acetaminophen (TYLENOL PO) Take 325 mg by mouth every 4 hours as needed          History   Smoking Status     Current Every Day Smoker     Packs/day: 0.50     Years: 21.00   Smokeless Tobacco     Never Used     Comment: using nicotine inhaler which helps          Allergies   Allergen Reactions     Nickel Itching     Seroquel [Quetiapine] Other (See Comments)     Restless leg syndrom       Problem, Medication and Allergy Lists were reviewed and updated if needed..         Review of Systems:    " "  General    Fat redistribution: no    Weight change: no HEENT    Voice change: no     Cardiovascular (CV)    Chest Pains: no    Shortness of breath: no Chest    Decreased exercise tolerance:  no    Breast changes/development: no     Gastrointestinal (GI)    Abdominal pain: no    Change in appetite: no Skin    Acne or oily skin: no    Change in hair: no     Genitourinary ()    Abnormal vaginal bleeding: still having cycles     Decreased spontaneous erections: not applicable    Change in libido: no    New sexual partners: no Musculoskeletal    Leg pain or swelling: no     Psychiatric (Psych)  Depression: YES- managed by psychiatrist  Anxiety/Panic: YES- managed by psychiatrist    Mood:  \"good\"                    Physical Exam:   Vitals:    09/07/17 0757   BP: 106/73   Pulse: 70   Resp: 16   Temp: 97.6  F (36.4  C)   TempSrc: Oral   SpO2: 100%   Weight: 212 lb 9.6 oz (96.4 kg)     BMI= Body mass index is 35.38 kg/(m^2).   Wt Readings from Last 10 Encounters:   09/07/17 212 lb 9.6 oz (96.4 kg)   08/11/17 208 lb 3.2 oz (94.4 kg)   07/21/17 205 lb 6.4 oz (93.2 kg)   06/23/17 197 lb (89.4 kg)     Appearance: Male appearance and dress    GENERAL:: healthy, alert and no distress  EYES: Eyes grossly normal to inspection, fundi benign and PERRL  HENT: ear canals normal, TM's normal, Nose normal, Mouth- no ulcers, no lesions  NECK: no adenopathy, no asymmetry, no masses,  and thyroid normal to palpation, supple  RESP: lungs clear to auscultation - no rales, no rhonchi, no wheezes  CV: regular rates and rhythm, normal S1 S2, no S3 or S4 and no murmur, no click or rub -  ABDOMEN: soft, no tenderness, no  hepatosplenomegaly, no masses, normal bowel sounds  MS: extremities normal- no gross deformities noted, no edema  SKIN: no suspicious lesions, no rashes  NEURO: Normal strength and tone, sensory exam grossly normal, mentation intact and speech normal, reflexes symmetric  BACK:No CVA tenderness, No paralumbar tenderness  Psych: " Alert and oriented times 3; coherent speech, normal rate and volume, able to articulate logical thoughts, able to abstract reason, no tangential thoughts, no hallucinations or delusions. Affect is bright.         Labs:   Pending      Assessment and Plan     ASSESSMENT / PLAN:  (F64.0) Gender dysphoria in adult  Comment:   Plan: increase to testosterone cypionate (DEPOTESTOTERONE) 200 MG/ML injection 52 mg IM Q Monday  Checked Testosterone total, Comprehensive Metabolic Panel ([facility]), CBC with Diff Plt ([facility]), Lipid Panel ([facility])          Contraception:   not needed  .   Counselled patient about controlled substances: Yes.    Follow up:  Follow up in 1 month.    Questions were elicited and answered.     25 min spent in direct face to face time with this patient, greater than 50% in counseling regarding above.    Chart forwarded to PCP for FYI.    Vladimir Michael MD

## 2017-09-12 LAB — TESTOST SERPL-MCNC: 226 NG/DL (ref 8–60)

## 2017-09-18 ENCOUNTER — TELEPHONE (OUTPATIENT)
Dept: FAMILY MEDICINE | Facility: CLINIC | Age: 37
End: 2017-09-18

## 2017-09-18 DIAGNOSIS — F64.0 GENDER DYSPHORIA IN ADULT: ICD-10-CM

## 2017-09-18 RX ORDER — TESTOSTERONE CYPIONATE 200 MG/ML
52 INJECTION, SOLUTION INTRAMUSCULAR WEEKLY
Qty: 1 ML | Refills: 1 | Status: CANCELLED | OUTPATIENT
Start: 2017-09-18

## 2017-09-18 NOTE — TELEPHONE ENCOUNTER
[facility] phone call message- patient requesting a refill:    Full Medication Name: Testostorone    Dose: .26ml into muscle one time weekly    Pharmacy confirmed as   [facility] - [place], [place] - [address] [place] [address]  Phone: [phone] Fax: [phone]  : Yes    Additional Comments: please refill and send to the [facility]     OK to leave a message on voice mail? Yes    Primary language: English      needed? No    Call taken on September 18, 2017 at 11:17 AM by Savita Contreras

## 2017-09-18 NOTE — TELEPHONE ENCOUNTER
Called pharmacy and verified that patient dropped off printed rx. Pharmacy confirmed that script was dropped off on 9/8/17, no refill needed.    Amanda Joy RN

## 2017-09-18 NOTE — TELEPHONE ENCOUNTER
Medication Refill Denied  Reason: Patient needs: provider visit and should have updated scripts from last appointment with Dr. Michael  Provider: I have not called the patient about the Rx denial, please call.  PCS: Please notify the pharmacy  RN: Please contact the patient to explain reasoning provided above and to schedule the patient for a provider visit with any provider. Should have testosterone prescription from Dr. Michael at last appointment 9/7/17.     RN may order temporary refill so that the patient will not run out of medication prior to the scheduled visit.    Tory Dhillon MD

## 2017-09-18 NOTE — TELEPHONE ENCOUNTER
Request for medication refill:    Date of last visit at clinic: 9/7/17    Please complete refill if appropriate and CLOSE ENCOUNTER.    Closing the encounter signifies the refill is complete.    If refill has been denied, please complete the smart phrase .smirefuse and route it to the [facility] RN TRIAGE pool to inform the patient and the pharmacy.    Luiza Razo

## 2017-10-04 DIAGNOSIS — F64.0 GENDER DYSPHORIA IN ADULT: ICD-10-CM

## 2017-10-04 NOTE — TELEPHONE ENCOUNTER
[facility] phone call message- patient requesting a refill:    Full Medication Name: testosterone cypionate         Pharmacy confirmed as   [facility] - [place], [place] - [address] [place] [address]  Phone: [phone] Fax: [phone]  : Yes    Additional Comments: Pt states, medication is completely out and want Rx to be sent to pharmacy.      OK to leave a message on voice mail? Yes    Primary language: English      needed? No    Call taken on October 4, 2017 at 8:17 AM by Mona Faust

## 2017-10-06 NOTE — TELEPHONE ENCOUNTER
Patient called to check on status of refill.  Routing high-priority to nurse due to patient stating they are out of medicaiton, and due to 10/04/2017 request date.

## 2017-10-06 NOTE — TELEPHONE ENCOUNTER
Request for medication refill:    Date of last visit at clinic: 9/7/17    Please complete refill if appropriate and CLOSE ENCOUNTER.    Closing the encounter signifies the refill is complete.    If refill has been denied, please complete the smart phrase .smirefuse and route it to the [facility] RN TRIAGE pool to inform the patient and the pharmacy.    Caitlyn Celeste RN

## 2017-10-06 NOTE — TELEPHONE ENCOUNTER
Medication Refill Denied  Reason: Patient needs: provider visit  Provider: I have not called the patient about the Rx denial, please call.  PCS: Please notify the pharmacy  RN: Please contact the patient to explain reasoning provided above and to schedule the patient for a provider visit with any provider.    RN may order temporary refill so that the patient will not run out of medication prior to the scheduled visit.    Tory Dhillon MD

## 2017-10-09 RX ORDER — TESTOSTERONE CYPIONATE 200 MG/ML
52 INJECTION, SOLUTION INTRAMUSCULAR WEEKLY
Qty: 1 ML | Refills: 1 | Status: CANCELLED | OUTPATIENT
Start: 2017-10-09

## 2017-10-10 DIAGNOSIS — F64.0 GENDER DYSPHORIA IN ADULT: ICD-10-CM

## 2017-10-10 RX ORDER — TESTOSTERONE CYPIONATE 200 MG/ML
52 INJECTION, SOLUTION INTRAMUSCULAR WEEKLY
Qty: 1 ML | Refills: 1 | Status: CANCELLED | OUTPATIENT
Start: 2017-10-10

## 2017-10-10 NOTE — TELEPHONE ENCOUNTER
Request for medication refill:    Date of last visit at clinic: 9-7-17    Please complete refill if appropriate and CLOSE ENCOUNTER.    Closing the encounter signifies the refill is complete.    If refill has been denied, please complete the smart phrase .smirefuse and route it to the [facility] RN TRIAGE pool to inform the patient and the pharmacy.    Mary Christiansen, CMA

## 2017-10-11 ENCOUNTER — OFFICE VISIT (OUTPATIENT)
Dept: FAMILY MEDICINE | Facility: CLINIC | Age: 37
End: 2017-10-11

## 2017-10-11 VITALS
WEIGHT: 221.6 LBS | OXYGEN SATURATION: 97 % | DIASTOLIC BLOOD PRESSURE: 70 MMHG | RESPIRATION RATE: 18 BRPM | HEART RATE: 66 BPM | BODY MASS INDEX: 36.92 KG/M2 | TEMPERATURE: 97.7 F | HEIGHT: 65 IN | SYSTOLIC BLOOD PRESSURE: 111 MMHG

## 2017-10-11 DIAGNOSIS — Z72.0 TOBACCO ABUSE: ICD-10-CM

## 2017-10-11 DIAGNOSIS — F64.0 GENDER DYSPHORIA IN ADULT: Primary | ICD-10-CM

## 2017-10-11 PROBLEM — M22.41 CHONDROMALACIA PATELLAE OF RIGHT KNEE: Status: ACTIVE | Noted: 2017-06-22

## 2017-10-11 RX ORDER — TESTOSTERONE CYPIONATE 200 MG/ML
50 INJECTION, SOLUTION INTRAMUSCULAR WEEKLY
Qty: 2 ML | Refills: 0 | Status: SHIPPED | OUTPATIENT
Start: 2017-10-11 | End: 2018-02-08

## 2017-10-11 NOTE — PATIENT INSTRUCTIONS
"Here is the plan from today's visit    1. Gender dysphoria in adult  Refilled meds     - testosterone cypionate (DEPOTESTOTERONE) 200 MG/ML injection; Inject 0.25 mLs (50 mg) into the muscle once a week  Dispense: 2 mL; Refill: 0  - Needle, Disp, 25G X 1-1/2\" MISC; Use once weekly for administering hormone IM  Dispense: 25 each; Refill: 3  - needle, disp, 18G X 1\" MISC; Use to draw up hormones once weekly  Dispense: 25 each; Refill: 3  - syringe, disposable, 1 ML MISC; Use once weekly to draw up hormones  Dispense: 25 each; Refill: 3    QUITPLAN  Services. No judgments. Just help.  Our clinic recommends Quit Plan Services -it Free and Effective    QUITPLAN Services exists for one simple reason. To do everything we can to help you conquer your addiction and become 100 percent tobacco-free. Not with lectures, but with genuine support. Check out all the FREE tools and services we offer, then pick the ones that are right for you. And even if you re not ready right now, we ll be here when you are.    The program combines counseling, medication and community to help any [place] tobacco user quit.     Getting started is easy -   [place] can simply visit [employer] or call [phone]-PLAN ([phone]) (available 24/7)  Para Ayunda espanol, llamenos: [phone]-EZNCUT-YA ([phone])    TTY: [phone]    QUITPLAN  Helpline  A complete program. You will receive one-on-one phone coaching sessions with trained tobacco counselors and additional tools to help you quit.    Starter Kit  Patches, Gum or Lozenges  Receive two weeks of free patches, gum or lozenges to help you get started.    Email Program  Receive a series of emails full of tips, advice and encouragement to help you quit.    Quit Guide  A practical and resourceful printable Quit Guide to help you build your plan to quit.     Text Messaging  Receive tips, games and reminders. The Vhxb1Cmid? program is full of practical advice and encouragement that can help you quit.   "   Please call or return to clinic if your symptoms don't go away.    Follow up plan  Please make a clinic appointment for follow up with me (Emerson Machado) or your primary physician Tory Dhillon in 4 weeks for Refils and Labs.    Thank you for coming to [facility] today.  Lab Testing:  **If you had lab testing today and your results are reassuring or normal they will be mailed to you or sent through [employer] within 7 days.   **If the lab tests need quick action we will call you with the results.  The phone number we will call with results is # [phone] (home) . If this is not the best number please call our clinic and change the number.  Medication Refills:  If you need any refills please call your pharmacy and they will contact us.   If you need to  your refill at a new pharmacy, please contact the new pharmacy directly. The new pharmacy will help you get your medications transferred faster.   Scheduling:  If you have any concerns about today's visit or wish to schedule another appointment please call our office during normal business hours [phone] (8-5:00 M-F)  If a referral was made to a [facility] and you don't get a call from central scheduling please call [phone].  If a Mammogram was ordered for you at [facility] call [phone] to schedule or change your appointment.  If you had an XRay/CT/Ultrasound/MRI ordered the number is [phone] to schedule or change your radiology appointment.   Medical Concerns:  If you have urgent medical concerns please call [phone] at any time of the day.

## 2017-10-11 NOTE — MR AVS SNAPSHOT
"              After Visit Summary   10/11/2017    Jesus Hurd    MRN: [id number]           Patient Information     Date Of Birth          1980        Visit Information        Provider Department      10/11/2017 3:00 PM Emerson Machado DO [facility]        Today's Diagnoses     Gender dysphoria in adult    -  1      Care Instructions    Here is the plan from today's visit    1. Gender dysphoria in adult  Refilled meds     - testosterone cypionate (DEPOTESTOTERONE) 200 MG/ML injection; Inject 0.25 mLs (50 mg) into the muscle once a week  Dispense: 2 mL; Refill: 0  - Needle, Disp, 25G X 1-1/2\" MISC; Use once weekly for administering hormone IM  Dispense: 25 each; Refill: 3  - needle, disp, 18G X 1\" MISC; Use to draw up hormones once weekly  Dispense: 25 each; Refill: 3  - syringe, disposable, 1 ML MISC; Use once weekly to draw up hormones  Dispense: 25 each; Refill: 3    QUITPLAN  Services. No judgments. Just help.  Our clinic recommends Quit Plan Services -it Free and Effective    QUITPLAN Services exists for one simple reason. To do everything we can to help you conquer your addiction and become 100 percent tobacco-free. Not with lectures, but with genuine support. Check out all the FREE tools and services we offer, then pick the ones that are right for you. And even if you re not ready right now, we ll be here when you are.    The program combines counseling, medication and community to help any [place] tobacco user quit.     Getting started is easy -   [place] can simply visit [employer] or call [phone] ([phone]) (available 24/7)  Para mulu Feliciano: [phone] ([phone])    TTY: [phone]    QUITPLAN  Helpline  A complete program. You will receive one-on-one phone coaching sessions with trained tobacco counselors and additional tools to help you quit.    Starter Kit  Patches, Gum or Lozenges  Receive two weeks of free patches, gum or lozenges to help " you get started.    Email Program  Receive a series of emails full of tips, advice and encouragement to help you quit.    Quit Guide  A practical and resourceful printable Quit Guide to help you build your plan to quit.     Text Messaging  Receive tips, games and reminders. The Suig8Lsdl? program is full of practical advice and encouragement that can help you quit.     Please call or return to clinic if your symptoms don't go away.    Follow up plan  Please make a clinic appointment for follow up with me (Emerson Machado) or your primary physician Tory Dhillon in 4 weeks for Refils and Labs.    Thank you for coming to [facility] today.  Lab Testing:  **If you had lab testing today and your results are reassuring or normal they will be mailed to you or sent through [employer] within 7 days.   **If the lab tests need quick action we will call you with the results.  The phone number we will call with results is # [phone] (home) . If this is not the best number please call our clinic and change the number.  Medication Refills:  If you need any refills please call your pharmacy and they will contact us.   If you need to  your refill at a new pharmacy, please contact the new pharmacy directly. The new pharmacy will help you get your medications transferred faster.   Scheduling:  If you have any concerns about today's visit or wish to schedule another appointment please call our office during normal business hours [phone] (8-5:00 M-F)  If a referral was made to a [facility] and you don't get a call from central scheduling please call [phone].  If a Mammogram was ordered for you at [facility] call [phone] to schedule or change your appointment.  If you had an XRay/CT/Ultrasound/MRI ordered the number is [phone] to schedule or change your radiology appointment.   Medical Concerns:  If you have urgent medical concerns please call [phone] at any time of  "the day.            Follow-ups after your visit        Who to contact     Please call your clinic at [phone] to:    Ask questions about your health    Make or cancel appointments    Discuss your medicines    Learn about your test results    Speak to your doctor   If you have compliments or concerns about an experience at your clinic, or if you wish to file a complaint, please contact [facility] Patient Relations at [phone] or email us at [email or web address]         Additional Information About Your Visit        MROharTwistle Information     [employer] is an electronic gateway that provides easy, online access to your medical records. With [employer], you can request a clinic appointment, read your test results, renew a prescription or communicate with your care team.     To sign up for [employer] visit the website at [email or web address]   You will be asked to enter the access code listed below, as well as some personal information. Please follow the directions to create your username and password.     Your access code is: [email or web address]  Expires: 2018  3:43 PM     Your access code will  in 90 days. If you need help or a new code, please contact your [facility] or call [phone] for assistance.        Care EveryWhere ID     This is your Care EveryWhere ID. This could be used by other organizations to access your [facility] medical records  [id number]        Your Vitals Were     Pulse Temperature Respirations Height Pulse Oximetry Breastfeeding?    66 97.7  F (36.5  C) (Oral) 18 5' 5\" (165.1 cm) 97% No    BMI (Body Mass Index)                   36.88 kg/m2            Blood Pressure from Last 3 Encounters:   10/11/17 111/70   17 106/73   17 105/69    Weight from Last 3 Encounters:   10/11/17 221 lb 9.6 oz (100.5 kg)   17 212 lb 9.6 oz (96.4 kg)   17 208 lb 3.2 oz (94.4 kg)              Today, you had the " "following     No orders found for display         Today's Medication Changes          These changes are accurate as of: 10/11/17  3:45 PM.  If you have any questions, ask your nurse or doctor.               These medicines have changed or have updated prescriptions.        Dose/Directions    * needle (disp) 18G X 1\" Misc   This may have changed:  Another medication with the same name was added. Make sure you understand how and when to take each.   Used for:  Gender dysphoria in adult   Changed by:  Tory Dhillon MD        Use to draw up hormones once weekly   Quantity:  25 each   Refills:  3       * needle (disp) 18G X 1\" Misc   This may have changed:  You were already taking a medication with the same name, and this prescription was added. Make sure you understand how and when to take each.   Used for:  Gender dysphoria in adult   Changed by:  Emerson Machado DO        Use to draw up hormones once weekly   Quantity:  25 each   Refills:  3       * Needle (Disp) 25G X 1-1/2\" Misc   This may have changed:  Another medication with the same name was added. Make sure you understand how and when to take each.   Used for:  Gender dysphoria in adult   Changed by:  Tory Dhillon MD        Use once weekly for administering hormone IM   Quantity:  25 each   Refills:  3       * Needle (Disp) 25G X 1-1/2\" Misc   This may have changed:  You were already taking a medication with the same name, and this prescription was added. Make sure you understand how and when to take each.   Used for:  Gender dysphoria in adult   Changed by:  Emerson Machado DO        Use once weekly for administering hormone IM   Quantity:  25 each   Refills:  3       * syringe (disposable) 1 ML Misc   This may have changed:  Another medication with the same name was added. Make sure you understand how and when to take each.   Used for:  Gender dysphoria in adult   Changed by:  Tory Dhillon MD        Use once weekly to draw up hormones " "  Quantity:  25 each   Refills:  3       * syringe (disposable) 1 ML Misc   This may have changed:  You were already taking a medication with the same name, and this prescription was added. Make sure you understand how and when to take each.   Used for:  Gender dysphoria in adult   Changed by:  Emerson Machado DO        Use once weekly to draw up hormones   Quantity:  25 each   Refills:  3       * testosterone cypionate 200 MG/ML injection   Commonly known as:  DEPOTESTOTERONE   This may have changed:  Another medication with the same name was added. Make sure you understand how and when to take each.   Used for:  Gender dysphoria in adult   Changed by:  Vladimir Michael MD        Dose:  52 mg   Inject 0.26 mLs (52 mg) into the muscle once a week   Quantity:  1 mL   Refills:  1       * testosterone cypionate 200 MG/ML injection   Commonly known as:  DEPOTESTOTERONE   This may have changed:  You were already taking a medication with the same name, and this prescription was added. Make sure you understand how and when to take each.   Used for:  Gender dysphoria in adult   Changed by:  Emerson Machado DO        Dose:  50 mg   Inject 0.25 mLs (50 mg) into the muscle once a week   Quantity:  2 mL   Refills:  0       * Notice:  This list has 8 medication(s) that are the same as other medications prescribed for you. Read the directions carefully, and ask your doctor or other care provider to review them with you.         Where to get your medicines      [facility] - [place], [place] - [address], [place] [address]     Phone:  [phone]     needle (disp) 18G X 1\" Misc    Needle (Disp) 25G X 1-1/2\" Misc    syringe (disposable) 1 ML Misc         Some of these will need a paper prescription and others can be bought over the counter.  Ask your nurse if you have questions.     Bring a paper prescription for each of these " "medications     testosterone cypionate 200 MG/ML injection                Primary Care Provider Office Phone # Fax #    Tory Dhillon [phone]       [address] [address]        Equal Access to Services     DAVEY MIRELES : Hadcaden jillian olson adela Cox, waaxda luqadaha, qaybta kaalmada ademinor, dejon shannon kasandra travis. So [facility] [phone].    ATENCIÓN: Si habla español, tiene a madera disposición servicios gratuitos de asistencia lingüística. Llame al [phone].    We comply with applicable federal civil rights laws and [place] laws. We do not discriminate on the basis of race, color, national origin, age, disability, sex, sexual orientation, or gender identity.            Thank you!     Thank you for choosing [facility]  for your care. Our goal is always to provide you with excellent care. Hearing back from our patients is one way we can continue to improve our services. Please take a few minutes to complete the written survey that you may receive in the mail after your visit with us. Thank you!             Your Updated Medication List - Protect others around you: Learn how to safely use, store and throw away your medicines at www.disposemymeds.org.          This list is accurate as of: 10/11/17  3:45 PM.  Always use your most recent med list.                   Brand Name Dispense Instructions for use Diagnosis    ARIPiprazole 9.75 MG/1.3ML injection    ABILIFY     Inject 10 mg into the muscle once        CITALOPRAM HYDROBROMIDE PO      Take 20 mg by mouth daily    Gender dysphoria in adult       gabapentin 100 MG capsule    NEURONTIN    1 capsule    Take 1 capsule (100 mg) by mouth 2 times daily 100-300mg at night        LATUDA 80 MG Tabs tablet   Generic drug:  lurasidone     1 tablet    Take 1 tablet (80 mg) by mouth daily        * needle (disp) 18G X 1\" Misc     25 each    Use to draw up hormones once weekly    Gender " "dysphoria in adult       * needle (disp) 18G X 1\" Misc     25 each    Use to draw up hormones once weekly    Gender dysphoria in adult       * Needle (Disp) 25G X 1-1/2\" Misc     25 each    Use once weekly for administering hormone IM    Gender dysphoria in adult       * Needle (Disp) 25G X 1-1/2\" Misc     25 each    Use once weekly for administering hormone IM    Gender dysphoria in adult       * syringe (disposable) 1 ML Misc     25 each    Use once weekly to draw up hormones    Gender dysphoria in adult       * syringe (disposable) 1 ML Misc     25 each    Use once weekly to draw up hormones    Gender dysphoria in adult       * testosterone cypionate 200 MG/ML injection    DEPOTESTOTERONE    1 mL    Inject 0.26 mLs (52 mg) into the muscle once a week    Gender dysphoria in adult       * testosterone cypionate 200 MG/ML injection    DEPOTESTOTERONE    2 mL    Inject 0.25 mLs (50 mg) into the muscle once a week    Gender dysphoria in adult       TYLENOL PO      Take 325 mg by mouth every 4 hours as needed        * Notice:  This list has 8 medication(s) that are the same as other medications prescribed for you. Read the directions carefully, and ask your doctor or other care provider to review them with you.      "

## 2017-10-11 NOTE — PROGRESS NOTES
Preceptor Attestation:   Patient seen and discussed with the resident. Assessment and plan reviewed with resident and agreed upon.   Supervising Physician:  Filippo Torres MD  [facility]

## 2017-10-11 NOTE — PROGRESS NOTES
"          SHAMEKA Lee is a 37 year old individual that uses pronouns He/Him/His/Himself that presents today for follow up of:  masculinizing hormone therapy. Gender identity: male    Any special concerns today?  no current concerns    On hormones?  YES +++ Shot day of the week? Monday      Due for labs?  No      +++ Refills of meds needed?  Yes  ---  Taking hormones as prescribed.     Past Surgical History:   Procedure Laterality Date     BREAST SURGERY         Patient Active Problem List   Diagnosis     Bipolar 1 disorder (H)     Anxiety     Gender dysphoria in adult     PTSD (post-traumatic stress disorder)     H/O: attempted suicide     Methamphetamine abuse in remission       Current Outpatient Prescriptions   Medication Sig Dispense Refill     CITALOPRAM HYDROBROMIDE PO Take 20 mg by mouth daily       testosterone cypionate (DEPOTESTOTERONE) 200 MG/ML injection Inject 0.25 mLs (50 mg) into the muscle once a week 2 mL 0     Needle, Disp, 25G X 1-1/2\" MISC Use once weekly for administering hormone IM 25 each 3     needle, disp, 18G X 1\" MISC Use to draw up hormones once weekly 25 each 3     syringe, disposable, 1 ML MISC Use once weekly to draw up hormones 25 each 3     ARIPiprazole (ABILIFY) 9.75 MG/1.3ML injection Inject 10 mg into the muscle once       testosterone cypionate (DEPOTESTOTERONE) 200 MG/ML injection Inject 0.26 mLs (52 mg) into the muscle once a week 1 mL 1     Needle, Disp, 25G X 1-1/2\" MISC Use once weekly for administering hormone IM 25 each 3     syringe, disposable, 1 ML MISC Use once weekly to draw up hormones 25 each 3     needle, disp, 18G X 1\" MISC Use to draw up hormones once weekly 25 each 3     gabapentin (NEURONTIN) 100 MG capsule Take 1 capsule (100 mg) by mouth 2 times daily 100-300mg at night 1 capsule 0     Acetaminophen (TYLENOL PO) Take 325 mg by mouth every 4 hours as needed        lurasidone (LATUDA) 80 MG TABS tablet Take 1 tablet (80 mg) by mouth daily 1 tablet 0 " "      History   Smoking Status     Current Every Day Smoker     Packs/day: 0.50     Years: 21.00   Smokeless Tobacco     Never Used     Comment: using nicotine inhaler which helps          Allergies   Allergen Reactions     Nickel Itching     Seroquel [Quetiapine] Other (See Comments)     Restless leg syndrom       Problem, Medication and Allergy Lists were reviewed and updated if needed..         Review of Systems:      General  Fat redistribution: YES    Hips going away  Weight change: YES    fluctuates HEENT  Voice change: YES    Desired effects     Cardiovascular (CV)    Chest Pains: no    Shortness of breath: no Chest    Decreased exercise tolerance:  no    Breast changes/development: not yet     Gastrointestinal (GI)    Abdominal pain: no  Change in appetite: YES    Fluctuates, still in relation to period Skin  Acne or oily skin: YES, inc  Change in hair: not yet         Genitourinary ()    Abnormal vaginal bleeding: no     Decreased spontaneous erections: no  Change in libido: YES, increased, but still fluct    New sexual partners: no Musculoskeletal    Leg pain or swelling: no     Psychiatric (Psych)  Depression: YES    Seeing psych, appts monthly. Going really well.    Anxiety/Panic: no    Mood:  \"energetic. Ut up in down over past 6mo\"                    Physical Exam:   Vitals:    10/11/17 1513   BP: 111/70   Pulse: 66   Resp: 18   Temp: 97.7  F (36.5  C)   TempSrc: Oral   SpO2: 97%   Weight: 221 lb 9.6 oz (100.5 kg)   Height: 5' 5\" (165.1 cm)     BMI= Body mass index is 36.88 kg/(m^2).   Wt Readings from Last 10 Encounters:   10/11/17 221 lb 9.6 oz (100.5 kg)   09/07/17 212 lb 9.6 oz (96.4 kg)   08/11/17 208 lb 3.2 oz (94.4 kg)   07/21/17 205 lb 6.4 oz (93.2 kg)   06/23/17 197 lb (89.4 kg)     Appearance: Male appearance and dress    GENERAL:: healthy, alert and no distress  EYES: Eyes grossly normal to inspection, fundi benign and PERRL  NECK: no adenopathy, no asymmetry, no masses,  and thyroid " normal to palpation, supple  RESP: lungs clear to auscultation - no rales, no rhonchi, no wheezes  CV: regular rates and rhythm, normal S1 S2, no S3 or S4 and no murmur, no click or rub -  ABDOMEN: soft, no tenderness, no  hepatosplenomegaly, no masses, normal bowel sounds  MS: extremities normal- no gross deformities noted, no edema  SKIN: no suspicious lesions, no rashes  NEURO: Normal strength and tone, sensory exam grossly normal, mentation intact and speech normal  Psych: Alert and oriented times 3; coherent speech, normal rate and volume, able to articulate logical thoughts, able to abstract reason, no tangential thoughts, no hallucinations or delusions.Affect: Appropriate/mood-congruent           Labs:   Results from last visit:  Office Visit on 09/07/2017   Component Date Value Ref Range Status     Testosterone Total 09/07/2017 226* 8 - 60 ng/dL Final    Comment: This test was developed and its performance characteristics determined by the   [facility],  Special [facility] Laboratory. It has   not been cleared or approved by the FDA. The laboratory is regulated under   CLIA as qualified to perform high-complexity testing. This test is used for   clinical purposes. It should not be regarded as investigational or for   research.       Albumin 09/07/2017 4.2  3.8 - 5.0 mg/dL Final     Alkaline Phosphatase 09/07/2017 75.7  31.7 - 110.5 U/L Final     ALT 09/07/2017 11.1  0.0 - 45.0 U/L Final     AST 09/07/2017 12.8  0.0 - 45.0 U/L Final     Bilirubin Total 09/07/2017 <0.4  0.2 - 1.3 mg/dL Final     Urea Nitrogen 09/07/2017 7.4  7.0 - 19.0 mg/dL Final     Calcium 09/07/2017 9.8  8.5 - 10.1 mg/dL Final     Chloride 09/07/2017 100.6  98.0 - 110.0 mmol/L Final     Carbon Dioxide 09/07/2017 30.2  20.0 - 32.0 mmol/L Final     Creatinine 09/07/2017 0.7  0.5 - 1.0 mg/dL Final     Glucose 09/07/2017 92.5  70.0 - 99.0 mg'dL Final     Potassium 09/07/2017 4.2  3.3 - 4.5 mmol/dL Final     Sodium  09/07/2017 136.6  132.6 - 141.4 mmol/L Final     Protein Total 09/07/2017 7.4  6.8 - 8.8 g/dL Final     GFR Estimate 09/07/2017 >90  >60.0 mL/min/1.7 m2 Final     GFR Estimate If Black 09/07/2017 >90  >60.0 mL/min/1.7 m2 Final     WBC 09/07/2017 7.9  4.0 - 11.0 K/uL Final     Lymphocytes # 09/07/2017 2.0  0.8 - 5.3 K/uL Final     % Lymphocytes 09/07/2017 25.0  20.0 - 48.0 %L Final     Mid # 09/07/2017 0.7  0.0 - 2.2 K/uL Final     Mid % 09/07/2017 8.7  0.0 - 20.0 %M Final     GRANULOCYTES # 09/07/2017 5.2  1.6 - 8.3 K/uL Final     % Granulocytes 09/07/2017 66.3  40.0 - 75.0 %G Final     RBC 09/07/2017 4.15  3.80 - 5.20 M/uL Final     Hemoglobin 09/07/2017 12.8  11.7 - 15.7 g/dL Final     Hematocrit 09/07/2017 40.5  35.0 - 47.0 % Final     MCV 09/07/2017 97.7  78.0 - 100.0 fL Final     MCH 09/07/2017 30.8  26.5 - 35.0 pg Final     MCHC 09/07/2017 31.6* 32.0 - 36.0 g/dL Final     Platelets 09/07/2017 339.0  150.0 - 450.0 K/uL Final     Cholesterol 09/07/2017 203.1* 0.0 - 200.0 mg/dL Final     Cholesterol/HDL Ratio 09/07/2017 4.5  0.0 - 5.0 Final     HDL Cholesterol 09/07/2017 44.7  >40.0 mg/dL Final     LDL Cholesterol Calculated 09/07/2017 134* 0 - 129 mg/dL Final     Triglycerides 09/07/2017 122.4  0.0 - 150.0 mg/dL Final     VLDL Cholesterol 09/07/2017 24.5  7.0 - 32.0 mg/dL Final       Assessment and Plan   Jesus was seen today for HRT refill.    1. Gender dysphoria in adult  Refilled supplies, and Testosterone for 1 month. Discussed expected timeline of physical changes as described below.     Effect Expected Onset Expected Maximum Effect   Skin oiliness/Acne 1-6 months 1-2 years   Facial/body hair growth 3-6 months 3-5 years    Scalp hair loss >12 months+ Variable   Increased muscle mass/strength 6-12 months 2-5 years   Body fat redistribution 3-6 months 2-5 years   Cessation of menses 2-6 months n/a   Clitoral enlargement 3-6 months 1-2 years   Vaginal atrophy 3-6 months 1-2 years   Deepened voice 3-12 months  "1-2 years     - testosterone cypionate (DEPOTESTOTERONE) 200 MG/ML injection; Inject 0.25 mLs (50 mg) into the muscle once a week  Dispense: 2 mL; Refill: 0  - Needle, Disp, 25G X 1-1/2\" MISC; Use once weekly for administering hormone IM  Dispense: 25 each; Refill: 3  - needle, disp, 18G X 1\" MISC; Use to draw up hormones once weekly  Dispense: 25 each; Refill: 3  - syringe, disposable, 1 ML MISC; Use once weekly to draw up hormones  Dispense: 25 each; Refill: 3    Contraception:   not needed, female partner, monogmous.     Counselled patient about controlled substances: Yes. Need for paper Rx, not sharing med.     2. Tobacco abuse  Discussed smoking cessation, as Jesus smokes about 1/2 a pack a day.   - SMOKING CESSATION COUNSELING 3-10 MIN (Tobacco Nicotine) [66899]    Follow up:  Follow up in 1 month.  No lab results expected from today.     Questions were elicited and answered.     Emerson Machado DO, MA  Family Medicine PGY-1  [facility], [facility]    Pager: [phone]      "

## 2017-10-11 NOTE — TELEPHONE ENCOUNTER
Message routed to . Patient needs appointment in clinic for Testosterone refill.     Rissa Carlson RN

## 2017-11-10 ENCOUNTER — OFFICE VISIT (OUTPATIENT)
Dept: FAMILY MEDICINE | Facility: CLINIC | Age: 37
End: 2017-11-10

## 2017-11-10 VITALS
BODY MASS INDEX: 36.94 KG/M2 | RESPIRATION RATE: 20 BRPM | TEMPERATURE: 97.7 F | HEART RATE: 78 BPM | OXYGEN SATURATION: 100 % | WEIGHT: 222 LBS | DIASTOLIC BLOOD PRESSURE: 82 MMHG | SYSTOLIC BLOOD PRESSURE: 129 MMHG

## 2017-11-10 DIAGNOSIS — F64.0 GENDER DYSPHORIA IN ADULT: Primary | ICD-10-CM

## 2017-11-10 LAB
% GRANULOCYTES: 65.4 %G (ref 40–75)
ALBUMIN SERPL-MCNC: 4.3 MG/DL (ref 3.8–5)
ALP SERPL-CCNC: 84.7 U/L (ref 31.7–110.5)
ALT SERPL-CCNC: 13.8 U/L (ref 0–45)
AST SERPL-CCNC: 18.7 U/L (ref 0–45)
BILIRUB SERPL-MCNC: <0.4 MG/DL (ref 0.2–1.3)
BILIRUBIN DIRECT: 0.1 MG/DL (ref 0.1–0.3)
GRANULOCYTES #: 5.2 K/UL (ref 1.6–8.3)
HCT VFR BLD AUTO: 46.7 % (ref 35–47)
HEMOGLOBIN: 14.4 G/DL (ref 11.7–15.7)
LYMPHOCYTES # BLD AUTO: 2 K/UL (ref 0.8–5.3)
LYMPHOCYTES NFR BLD AUTO: 25.5 %L (ref 20–48)
MCH RBC QN AUTO: 30.3 PG (ref 26.5–35)
MCHC RBC AUTO-ENTMCNC: 30.8 G/DL (ref 32–36)
MCV RBC AUTO: 98.4 FL (ref 78–100)
MID #: 0.7 K/UL (ref 0–2.2)
MID %: 9.1 %M (ref 0–20)
PLATELET # BLD AUTO: 348 K/UL (ref 150–450)
PROT SERPL-MCNC: 7.7 G/DL (ref 6.8–8.8)
RBC # BLD AUTO: 4.75 M/UL (ref 3.8–5.2)
WBC # BLD AUTO: 8 K/UL (ref 4–11)

## 2017-11-10 RX ORDER — TESTOSTERONE CYPIONATE 200 MG/ML
50 INJECTION, SOLUTION INTRAMUSCULAR WEEKLY
Qty: 1 ML | Refills: 2 | Status: SHIPPED | OUTPATIENT
Start: 2017-11-10 | End: 2018-01-22

## 2017-11-10 ASSESSMENT — ANXIETY QUESTIONNAIRES
GAD7 TOTAL SCORE: 7
2. NOT BEING ABLE TO STOP OR CONTROL WORRYING: NOT AT ALL
7. FEELING AFRAID AS IF SOMETHING AWFUL MIGHT HAPPEN: NOT AT ALL
IF YOU CHECKED OFF ANY PROBLEMS ON THIS QUESTIONNAIRE, HOW DIFFICULT HAVE THESE PROBLEMS MADE IT FOR YOU TO DO YOUR WORK, TAKE CARE OF THINGS AT HOME, OR GET ALONG WITH OTHER PEOPLE: SOMEWHAT DIFFICULT
6. BECOMING EASILY ANNOYED OR IRRITABLE: NOT AT ALL
5. BEING SO RESTLESS THAT IT IS HARD TO SIT STILL: MORE THAN HALF THE DAYS
3. WORRYING TOO MUCH ABOUT DIFFERENT THINGS: NOT AT ALL
1. FEELING NERVOUS, ANXIOUS, OR ON EDGE: MORE THAN HALF THE DAYS

## 2017-11-10 ASSESSMENT — PATIENT HEALTH QUESTIONNAIRE - PHQ9
SUM OF ALL RESPONSES TO PHQ QUESTIONS 1-9: 9
5. POOR APPETITE OR OVEREATING: NEARLY EVERY DAY

## 2017-11-10 NOTE — PROGRESS NOTES
"          SHAMEKA Lee is a 37 year old individual that uses pronouns He/Him/His/Himself that presents today for follow up of:  masculinizing hormone therapy. Gender identity: male    Any special concerns today?  no current concerns    On hormones?  YES +++ Shot day of the week? Monday      Due for labs?  Yes      +++ Refills of meds needed?  Yes  ---    Past Surgical History:   Procedure Laterality Date     BREAST SURGERY       ENT SURGERY  10/24/2017    Plastic Nose Repair       Patient Active Problem List   Diagnosis     Bipolar 1 disorder (H)     Anxiety     Gender dysphoria in adult     PTSD (post-traumatic stress disorder)     H/O: attempted suicide     Methamphetamine abuse in remission     Chondromalacia patellae of right knee       Current Outpatient Prescriptions   Medication Sig Dispense Refill     CITALOPRAM HYDROBROMIDE PO Take 20 mg by mouth daily       ARIPiprazole (ABILIFY) 9.75 MG/1.3ML injection Inject 10 mg into the muscle once       testosterone cypionate (DEPOTESTOTERONE) 200 MG/ML injection Inject 0.26 mLs (52 mg) into the muscle once a week 1 mL 1     gabapentin (NEURONTIN) 100 MG capsule Take 1 capsule (100 mg) by mouth 2 times daily 100-300mg at night 1 capsule 0     testosterone cypionate (DEPOTESTOTERONE) 200 MG/ML injection Inject 0.25 mLs (50 mg) into the muscle once a week 2 mL 0     Needle, Disp, 25G X 1-1/2\" MISC Use once weekly for administering hormone IM 25 each 3     needle, disp, 18G X 1\" MISC Use to draw up hormones once weekly 25 each 3     syringe, disposable, 1 ML MISC Use once weekly to draw up hormones 25 each 3     Needle, Disp, 25G X 1-1/2\" MISC Use once weekly for administering hormone IM 25 each 3     syringe, disposable, 1 ML MISC Use once weekly to draw up hormones 25 each 3     needle, disp, 18G X 1\" MISC Use to draw up hormones once weekly 25 each 3     lurasidone (LATUDA) 80 MG TABS tablet Take 1 tablet (80 mg) by mouth daily 1 tablet 0     Acetaminophen (TYLENOL " "PO) Take 325 mg by mouth every 4 hours as needed          History   Smoking Status     Current Every Day Smoker     Packs/day: 0.50     Years: 21.00   Smokeless Tobacco     Never Used     Comment: using nicotine inhaler which helps          Allergies   Allergen Reactions     Nickel Itching     Seroquel [Quetiapine] Other (See Comments)     Restless leg syndrom       Problem, Medication and Allergy Lists were reviewed and updated if needed..         Review of Systems:      General  Fat redistribution: YES- smaller hips    Weight change: no HEENT  Voice change: YES    deepening     Cardiovascular (CV)    Chest Pains: no    Shortness of breath: no Chest    Decreased exercise tolerance:  no    Breast changes/development: no     Gastrointestinal (GI)    Abdominal pain: no  Change in appetite: YES    Hungrier, bigger portions Skin  Acne or oily skin: YES  Change in hair: YES more hair growth     Genitourinary ()    Abnormal vaginal bleeding: no     Decreased spontaneous erections: YES libido present but difficulties with orgasm   Change in libido: YES    New sexual partners: no Musculoskeletal    Leg pain or swelling: no     Psychiatric (Psych)    Depression: on citalopram, no outbursts. good    Anxiety/Panic: no    Mood:  \"good\"                    Physical Exam:   Vitals:    11/10/17 1304   BP: 129/82   Pulse: 78   Resp: 20   Temp: 97.7  F (36.5  C)   TempSrc: Oral   SpO2: 100%   Weight: 222 lb (100.7 kg)     BMI= Body mass index is 36.94 kg/(m^2).   Wt Readings from Last 10 Encounters:   11/10/17 222 lb (100.7 kg)   10/11/17 221 lb 9.6 oz (100.5 kg)   09/07/17 212 lb 9.6 oz (96.4 kg)   08/11/17 208 lb 3.2 oz (94.4 kg)   07/21/17 205 lb 6.4 oz (93.2 kg)   06/23/17 197 lb (89.4 kg)     Appearance: Male appearance and dress    GENERAL:: healthy, alert and no distress  RESP: lungs clear to auscultation - no rales, no rhonchi, no wheezes  ABDOMEN: soft, no tenderness, no  hepatosplenomegaly, no masses, normal bowel " sounds  MS: extremities normal- no gross deformities noted, no edema  SKIN: no suspicious lesions, no rashes  Psych: Alert and oriented times 3; coherent speech, normal rate and volume, able to articulate logical thoughts, able to abstract reason, no tangential thoughts, no hallucinations or delusions. Affect is normal.  Affect: Appropriate/mood-congruent           Labs:    Results from the last 24 hoursNo results found for this or any previous visit (from the past 24 hour(s)).    Assessment and Plan     1. Gender dysphoria in adult    -Will check labs, CBC, LFTs and total testosterone today, and lipids and glucose when fasting.   - Testosterone Total  - Hepatic Panel  - CBC with Diff Plt  - Lipid Cascade; Future  - Glucose; Future  - testosterone cypionate (DEPOTESTOTERONE) 200 MG/ML injection; Inject 0.25 mLs (50 mg) into the muscle once a week  Dispense: 1 mL; Refill: 2    Contraception:  N/a partner (wife) is cis and does not have sperm     Counselled patient about controlled substances: Yes. Details: no concerns. Has enough needles at home and will call if refills needed.     Follow up:  Follow up in 3 months for HRT appt.   Results by phone  Questions were elicited and answered.  Will follow up in meantime PRN.      Emerson Machado DO, MA  Family Medicine PGY-1  [facility]    Pager: [phone]

## 2017-11-10 NOTE — PROGRESS NOTES
Preceptor Attestation:   Patient seen and discussed with the resident. Assessment and plan reviewed with resident and agreed upon.   Supervising Physician:  Nelson Worthington MD  [facility]

## 2017-11-10 NOTE — PATIENT INSTRUCTIONS
Here is the plan from today's visit    1. Gender dysphoria in adult  Refilled meds.   - Testosterone Total  - Hepatic Panel  - CBC with Diff Plt  - Lipid Cascade; Future  - Glucose; Future  - testosterone cypionate (DEPOTESTOTERONE) 200 MG/ML injection; Inject 0.25 mLs (50 mg) into the muscle once a week  Dispense: 1 mL; Refill: 2      Please call or return to clinic if your symptoms don't go away.    Follow up plan  Please make a clinic appointment for follow up with me (VLADIMIR KAISER) in 3  months for follow up Hormones.    Thank you for coming to [facility] today.  Lab Testing:  **If you had lab testing today and your results are reassuring or normal they will be mailed to you or sent through [employer] within 7 days.   **If the lab tests need quick action we will call you with the results.  The phone number we will call with results is # [phone] (home) . If this is not the best number please call our clinic and change the number.  Medication Refills:  If you need any refills please call your pharmacy and they will contact us.   If you need to  your refill at a new pharmacy, please contact the new pharmacy directly. The new pharmacy will help you get your medications transferred faster.   Scheduling:  If you have any concerns about today's visit or wish to schedule another appointment please call our office during normal business hours [phone] (8-5:00 M-F)  If a referral was made to a [facility] and you don't get a call from central scheduling please call [phone].  If a Mammogram was ordered for you at [facility] call [phone] to schedule or change your appointment.  If you had an XRay/CT/Ultrasound/MRI ordered the number is [phone] to schedule or change your radiology appointment.   Medical Concerns:  If you have urgent medical concerns please call [phone] at any time of the day.

## 2017-11-10 NOTE — MR AVS SNAPSHOT
After Visit Summary   11/10/2017    Jesus Hurd    MRN: [id number]           Patient Information     Date Of Birth          1980        Visit Information        Provider Department      11/10/2017 1:00 PM Emerson Machado,  [facility]        Today's Diagnoses     Gender dysphoria in adult    -  1      Care Instructions    Here is the plan from today's visit    1. Gender dysphoria in adult  Refilled meds.   - Testosterone Total  - Hepatic Panel  - CBC with Diff Plt  - Lipid Cascade; Future  - Glucose; Future  - testosterone cypionate (DEPOTESTOTERONE) 200 MG/ML injection; Inject 0.25 mLs (50 mg) into the muscle once a week  Dispense: 1 mL; Refill: 2      Please call or return to clinic if your symptoms don't go away.    Follow up plan  Please make a clinic appointment for follow up with me (EMERSON MACHADO) in 3  months for follow up Hormones.    Thank you for coming to [facility] today.  Lab Testing:  **If you had lab testing today and your results are reassuring or normal they will be mailed to you or sent through [employer] within 7 days.   **If the lab tests need quick action we will call you with the results.  The phone number we will call with results is # [phone] (home) . If this is not the best number please call our clinic and change the number.  Medication Refills:  If you need any refills please call your pharmacy and they will contact us.   If you need to  your refill at a new pharmacy, please contact the new pharmacy directly. The new pharmacy will help you get your medications transferred faster.   Scheduling:  If you have any concerns about today's visit or wish to schedule another appointment please call our office during normal business hours [phone] (8-5:00 M-F)  If a referral was made to a [facility] and you don't get a call from central scheduling please call [phone].  If a Mammogram was ordered for you at  [facility] call [phone] to schedule or change your appointment.  If you had an XRay/CT/Ultrasound/MRI ordered the number is [phone] to schedule or change your radiology appointment.   Medical Concerns:  If you have urgent medical concerns please call [phone] at any time of the day.            Follow-ups after your visit        Future tests that were ordered for you today     Open Future Orders        Priority Expected Expires Ordered    Lipid Cascade Routine  11/10/2018 11/10/2017    Glucose Routine  11/10/2018 11/10/2017            Who to contact     Please call your clinic at [phone] to:    Ask questions about your health    Make or cancel appointments    Discuss your medicines    Learn about your test results    Speak to your doctor   If you have compliments or concerns about an experience at your clinic, or if you wish to file a complaint, please contact [facility] Patient Relations at [phone] or email us at [email or web address]         Additional Information About Your Visit        [employer] Information     [email or web address] is an electronic gateway that provides easy, online access to your medical records. With [email or web address], you can request a clinic appointment, read your test results, renew a prescription or communicate with your care team.     To sign up for [email or web address] visit the website at [email or web address]   You will be asked to enter the access code listed below, as well as some personal information. Please follow the directions to create your username and password.     Your access code is: [email or web address]  Expires: 2018  2:43 PM     Your access code will  in 90 days. If you need help or a new code, please contact your [facility] or call [phone] for assistance.        Care EveryWhere ID     This is your Care EveryWhere ID. This could be used by other organizations to access your [facility]  records  [id number]        Your Vitals Were     Pulse Temperature Respirations Pulse Oximetry BMI (Body Mass Index)       78 97.7  F (36.5  C) (Oral) 20 100% 36.94 kg/m2        Blood Pressure from Last 3 Encounters:   11/10/17 129/82   10/11/17 111/70   09/07/17 106/73    Weight from Last 3 Encounters:   11/10/17 222 lb (100.7 kg)   10/11/17 221 lb 9.6 oz (100.5 kg)   09/07/17 212 lb 9.6 oz (96.4 kg)              We Performed the Following     CBC with Diff Plt     Hepatic Panel     Testosterone Total          Today's Medication Changes          These changes are accurate as of: 11/10/17  1:45 PM.  If you have any questions, ask your nurse or doctor.               These medicines have changed or have updated prescriptions.        Dose/Directions    * testosterone cypionate 200 MG/ML injection   Commonly known as:  DEPOTESTOTERONE   This may have changed:  Another medication with the same name was changed. Make sure you understand how and when to take each.   Used for:  Gender dysphoria in adult   Changed by:  Emerson Machado DO        Dose:  50 mg   Inject 0.25 mLs (50 mg) into the muscle once a week   Quantity:  2 mL   Refills:  0       * testosterone cypionate 200 MG/ML injection   Commonly known as:  DEPOTESTOTERONE   This may have changed:  how much to take   Used for:  Gender dysphoria in adult   Changed by:  Emerson Machado DO        Dose:  50 mg   Inject 0.25 mLs (50 mg) into the muscle once a week   Quantity:  1 mL   Refills:  2       * Notice:  This list has 2 medication(s) that are the same as other medications prescribed for you. Read the directions carefully, and ask your doctor or other care provider to review them with you.         Where to get your medicines      Some of these will need a paper prescription and others can be bought over the counter.  Ask your nurse if you have questions.     Bring a paper prescription for each of these medications     testosterone cypionate 200 MG/ML injection        "         Primary Care Provider Office Phone # Fax #    Tory Dhillon [phone]       [address] [address]        Equal Access to Services     LEANNEMARIAM ALINE : Kristi jillian olson adela Cox, waaxda luqadaha, qaybta kaalmada manuel, dejon shannon laNathanshade travis. [facility] [phone].    ATENCIÓN: Si habla español, tiene a madera disposición servicios gratuitos de asistencia lingüística. Treame al [phone].    We comply with applicable federal civil rights laws and [place] laws. We do not discriminate on the basis of race, color, national origin, age, disability, sex, sexual orientation, or gender identity.            Thank you!     Thank you for choosing [facility]  for your care. Our goal is always to provide you with excellent care. Hearing back from our patients is one way we can continue to improve our services. Please take a few minutes to complete the written survey that you may receive in the mail after your visit with us. Thank you!             Your Updated Medication List - Protect others around you: Learn how to safely use, store and throw away your medicines at www.disposemymeds.org.          This list is accurate as of: 11/10/17  1:45 PM.  Always use your most recent med list.                   Brand Name Dispense Instructions for use Diagnosis    ARIPiprazole 9.75 MG/1.3ML injection    ABILIFY     Inject 10 mg into the muscle once        CITALOPRAM HYDROBROMIDE PO      Take 20 mg by mouth daily    Gender dysphoria in adult       gabapentin 100 MG capsule    NEURONTIN    1 capsule    Take 1 capsule (100 mg) by mouth 2 times daily 100-300mg at night        LATUDA 80 MG Tabs tablet   Generic drug:  lurasidone     1 tablet    Take 1 tablet (80 mg) by mouth daily        * needle (disp) 18G X 1\" Misc     25 each    Use to draw up hormones once weekly    Gender dysphoria in adult       * needle (disp) 18G X 1\" Misc     25 each " "   Use to draw up hormones once weekly    Gender dysphoria in adult       * Needle (Disp) 25G X 1-1/2\" Misc     25 each    Use once weekly for administering hormone IM    Gender dysphoria in adult       * Needle (Disp) 25G X 1-1/2\" Misc     25 each    Use once weekly for administering hormone IM    Gender dysphoria in adult       * syringe (disposable) 1 ML Misc     25 each    Use once weekly to draw up hormones    Gender dysphoria in adult       * syringe (disposable) 1 ML Misc     25 each    Use once weekly to draw up hormones    Gender dysphoria in adult       * testosterone cypionate 200 MG/ML injection    DEPOTESTOTERONE    2 mL    Inject 0.25 mLs (50 mg) into the muscle once a week    Gender dysphoria in adult       * testosterone cypionate 200 MG/ML injection    DEPOTESTOTERONE    1 mL    Inject 0.25 mLs (50 mg) into the muscle once a week    Gender dysphoria in adult       TYLENOL PO      Take 325 mg by mouth every 4 hours as needed        * Notice:  This list has 8 medication(s) that are the same as other medications prescribed for you. Read the directions carefully, and ask your doctor or other care provider to review them with you.      "

## 2017-11-11 ASSESSMENT — ANXIETY QUESTIONNAIRES: GAD7 TOTAL SCORE: 7

## 2017-11-14 LAB — TESTOST SERPL-MCNC: 187 NG/DL (ref 8–60)

## 2018-01-22 DIAGNOSIS — F64.0 GENDER DYSPHORIA IN ADULT: ICD-10-CM

## 2018-01-22 RX ORDER — TESTOSTERONE CYPIONATE 200 MG/ML
50 INJECTION, SOLUTION INTRAMUSCULAR WEEKLY
Qty: 1 ML | Refills: 2 | Status: SHIPPED | OUTPATIENT
Start: 2018-01-22 | End: 2018-02-08

## 2018-01-22 NOTE — TELEPHONE ENCOUNTER
[facility] phone call message- patient requesting a refill:    Full Medication Name: testosterone cypionate (DEPOTESTOTERONE) 200 MG/ML injection    Dose: Inject 0.25 mLs (50 mg) into the muscle once a week    Pharmacy confirmed as   [id number] - [place], [place] - [address] [place] [address] [address] [place] [address]  Phone: [phone] Fax: [phone]  : Yes    Additional Comments: Patient called and requested to speak with a nurse. Patient advised that he is currently out of med listed above and is due for next injection today. Per patient, unable to refill prescription until he has an office visit; next office visit scheduled for 2/8/18.    OK to leave a message on voice mail? Yes    Primary language: English      needed? No    Call taken on January 22, 2018 at 10:33 AM by Erna Cassidy

## 2018-01-22 NOTE — TELEPHONE ENCOUNTER
Message routed to PCP at high priority and page placed to determine if refill appropriate.    Rissa Carlson RN

## 2018-02-08 ENCOUNTER — OFFICE VISIT (OUTPATIENT)
Dept: FAMILY MEDICINE | Facility: CLINIC | Age: 38
End: 2018-02-08

## 2018-02-08 VITALS
OXYGEN SATURATION: 98 % | HEART RATE: 93 BPM | DIASTOLIC BLOOD PRESSURE: 76 MMHG | BODY MASS INDEX: 39.94 KG/M2 | TEMPERATURE: 98.1 F | WEIGHT: 240 LBS | SYSTOLIC BLOOD PRESSURE: 118 MMHG | RESPIRATION RATE: 18 BRPM

## 2018-02-08 DIAGNOSIS — F64.0 GENDER DYSPHORIA IN ADULT: Primary | ICD-10-CM

## 2018-02-08 LAB
% GRANULOCYTES: 70.5 %G (ref 40–75)
ALBUMIN SERPL-MCNC: 4.2 MG/DL (ref 3.8–5)
ALP SERPL-CCNC: 93 U/L (ref 31.7–110.5)
ALT SERPL-CCNC: 16.9 U/L (ref 0–45)
AST SERPL-CCNC: 21.4 U/L (ref 0–45)
BILIRUB SERPL-MCNC: <0.4 MG/DL (ref 0.2–1.3)
BILIRUBIN DIRECT: 0.2 MG/DL (ref 0.1–0.3)
CHOLEST SERPL-MCNC: 200.9 MG/DL (ref 0–200)
CHOLEST/HDLC SERPL: 6.7 {RATIO} (ref 0–5)
GRANULOCYTES #: 6.6 K/UL (ref 1.6–8.3)
HCT VFR BLD AUTO: 49.4 % (ref 35–47)
HDLC SERPL-MCNC: 30.1 MG/DL
HEMOGLOBIN: 15.6 G/DL (ref 11.7–15.7)
LDLC SERPL CALC-MCNC: 106 MG/DL (ref 0–129)
LYMPHOCYTES # BLD AUTO: 2.1 K/UL (ref 0.8–5.3)
LYMPHOCYTES NFR BLD AUTO: 22.4 %L (ref 20–48)
MCH RBC QN AUTO: 31.1 PG (ref 26.5–35)
MCHC RBC AUTO-ENTMCNC: 32.2 G/DL (ref 32–36)
MCV RBC AUTO: 96.4 FL (ref 78–100)
MID #: 0.7 K/UL (ref 0–2.2)
MID %: 7.1 %M (ref 0–20)
PLATELET # BLD AUTO: 336 K/UL (ref 150–450)
PROT SERPL-MCNC: 7.8 G/DL (ref 6.8–8.8)
RBC # BLD AUTO: 5.12 M/UL (ref 3.8–5.2)
TRIGL SERPL-MCNC: 324.9 MG/DL (ref 0–150)
VLDL CHOLESTEROL: 65 MG/DL (ref 7–32)
WBC # BLD AUTO: 9.3 K/UL (ref 4–11)

## 2018-02-08 RX ORDER — TESTOSTERONE CYPIONATE 200 MG/ML
50 INJECTION, SOLUTION INTRAMUSCULAR WEEKLY
Qty: 1 ML | Refills: 0 | Status: SHIPPED | OUTPATIENT
Start: 2018-02-08 | End: 2018-02-08

## 2018-02-08 RX ORDER — TESTOSTERONE CYPIONATE 200 MG/ML
50 INJECTION, SOLUTION INTRAMUSCULAR WEEKLY
Qty: 1 ML | Refills: 0 | Status: SHIPPED | OUTPATIENT
Start: 2018-04-08 | End: 2018-07-06

## 2018-02-08 RX ORDER — TESTOSTERONE CYPIONATE 200 MG/ML
50 INJECTION, SOLUTION INTRAMUSCULAR WEEKLY
Qty: 1 ML | Refills: 0 | Status: SHIPPED | OUTPATIENT
Start: 2018-03-08 | End: 2018-02-08

## 2018-02-08 NOTE — PROGRESS NOTES
"       SHAMEKA Lee is a 37 year old individual that uses pronouns He/Him/His/Himself that presents today for follow up of:  masculinizing hormone therapy. Gender identity: fabián Lee has now been on HRT for 6 months.  It is going well, would like to increase testosterone dose if possible (currently taking 0.3 mL (equivalent to 60 mg IM every week)- states Dr. Machado said it was okay to increase to 0.3 mL q week; however, all documentation in prior notes indicates that patient should be taking 0.25 mg (equivalent to 50 mg IM every week)    Any special concerns today?  no current concerns    On hormones?  YES +++ Shot day of the week? Monday      Due for labs?  Yes      +++ Refills of meds needed?  Yes  ---    Past Surgical History:   Procedure Laterality Date     BREAST SURGERY       ENT SURGERY  10/24/2017    Plastic Nose Repair       Patient Active Problem List   Diagnosis     Bipolar 1 disorder (H)     Anxiety     Gender dysphoria in adult     PTSD (post-traumatic stress disorder)     H/O: attempted suicide     Methamphetamine abuse in remission     Chondromalacia patellae of right knee       Current Outpatient Prescriptions   Medication Sig Dispense Refill     testosterone cypionate (DEPOTESTOTERONE) 200 MG/ML injection Inject 0.25 mLs (50 mg) into the muscle once a week 1 mL 2     CITALOPRAM HYDROBROMIDE PO Take 20 mg by mouth daily       testosterone cypionate (DEPOTESTOTERONE) 200 MG/ML injection Inject 0.25 mLs (50 mg) into the muscle once a week 2 mL 0     Needle, Disp, 25G X 1-1/2\" MISC Use once weekly for administering hormone IM 25 each 3     needle, disp, 18G X 1\" MISC Use to draw up hormones once weekly 25 each 3     syringe, disposable, 1 ML MISC Use once weekly to draw up hormones 25 each 3     ARIPiprazole (ABILIFY) 9.75 MG/1.3ML injection Inject 10 mg into the muscle once       Needle, Disp, 25G X 1-1/2\" MISC Use once weekly for administering hormone IM 25 each 3     syringe, disposable, 1 ML MISC " "Use once weekly to draw up hormones 25 each 3     needle, disp, 18G X 1\" MISC Use to draw up hormones once weekly 25 each 3     gabapentin (NEURONTIN) 100 MG capsule Take 1 capsule (100 mg) by mouth 2 times daily 100-300mg at night 1 capsule 0     Acetaminophen (TYLENOL PO) Take 325 mg by mouth every 4 hours as needed        lurasidone (LATUDA) 80 MG TABS tablet Take 1 tablet (80 mg) by mouth daily 1 tablet 0       History   Smoking Status     Current Every Day Smoker     Packs/day: 0.50     Years: 21.00   Smokeless Tobacco     Never Used     Comment: using nicotine inhaler which helps          Allergies   Allergen Reactions     Nickel Itching     Seroquel [Quetiapine] Other (See Comments)     Restless leg syndrom       Problem, Medication and Allergy Lists were reviewed and are current..         Review of Systems:      General    Fat redistribution: YES    Weight change: no HEENT    Voice change: YES     Cardiovascular (CV)    Chest Pains: no    Shortness of breath: no Chest    Decreased exercise tolerance:  no    Breast changes/development: no     Gastrointestinal (GI)    Abdominal pain: no    Change in appetite: no Skin    Acne or oily skin: YES    Change in hair: YES     Genitourinary ()    Abnormal vaginal bleeding: no     Decreased spontaneous erections: no    Change in libido: YES, increased sex drive    New sexual partners: no Musculoskeletal    Leg pain or swelling: no     Psychiatric (Psych)    Depression: no    Anxiety/Panic: no    Mood:  \"good\"                    Physical Exam:     Vitals:    02/08/18 1358   BP: 118/76   Pulse: 93   Resp: 18   Temp: 98.1  F (36.7  C)   TempSrc: Oral   SpO2: 98%   Weight: 240 lb (108.9 kg)     BMI= Body mass index is 39.94 kg/(m^2).   Wt Readings from Last 10 Encounters:   02/08/18 240 lb (108.9 kg)   11/10/17 222 lb (100.7 kg)   10/11/17 221 lb 9.6 oz (100.5 kg)   09/07/17 212 lb 9.6 oz (96.4 kg)   08/11/17 208 lb 3.2 oz (94.4 kg)   07/21/17 205 lb 6.4 oz (93.2 kg) "   06/23/17 197 lb (89.4 kg)     Appearance: Male appearance and dress    GENERAL:: healthy, alert and no distress  EYES: Eyes grossly normal to inspection  RESP: lungs clear to auscultation - no rales, no rhonchi, no wheezes  CV: regular rates and rhythm, normal S1 S2 and no murmur  MS: extremities normal- no gross deformities noted, no edema  SKIN: no suspicious lesions, no rashes  NEURO: Normal strength and tone, sensory exam grossly normal, mentation intact and speech normal, reflexes symmetric  Psych: Alert and oriented times 3; coherent speech, normal rate and volume, able to articulate logical thoughts, able to abstract reason, no tangential thoughts, no hallucinations or delusions. Affect is Good.  Affect: Appropriate/mood-congruent    Patient sexually active, monogamous relationship, no contraception needed.          Labs:   Labs pending    Assessment and Plan       Jesus was seen today for recheck.    Diagnoses and all orders for this visit:    Gender dysphoria in adult  Jesus is doing well with his testosterone, pleased with effects, would like to increase dose if possible.  I told him we will obtain routine labs today (due for 6 month labs).  Based on labs, will determine if we can go up on his testosterone.  I will also notify his PCP to help with decision making.  For today, I gave him 3 months of testosterone (based on current dosing of 0.25 ml each week even though patient stated he is taking 0.3 mL).  Will notify PCP about clarification in dosing and if dose can be increased.  If dosing is increased, he will need a refill in less than 3 months.  Next follow up is due in 6 months unless PCP would like further follow up.    -     testosterone cypionate (DEPOTESTOTERONE) 200 MG/ML injection; Inject 0.25 mLs (50 mg) into the muscle once a week  -     Testosterone Total  -     Hepatic Panel  -     CBC with Diff Plt  -     Lipid Cascade  -     testosterone cypionate (DEPOTESTOTERONE) 200 MG/ML injection;  "Inject 0.25 mLs (50 mg) into the muscle once a week  -     Needle, Disp, 25G X 1-1/2\" MISC; Use once weekly for administering hormone IM  -     needle, disp, 18G X 1\" MISC; Use to draw up hormones once weekly  -     syringe, disposable, 1 ML MISC; Use once weekly to draw up hormones    Contraception:   not needed    Counselled patient about controlled substances: Yes.  Follow up:  Follow up in 6 months.   Results by leonardo  Questions were elicited and answered.      Tiffany Hernandez M.D.  PGY-3, Family Medicine    "

## 2018-02-08 NOTE — LETTER
February 13, 2018      Jesus Hurd  [address] [facility] [address]  [place] [address]        Dear Jesus,    I spoke with Dr. Machado regarding your testosterone and cholesterol.  Your triglycerides are elevated so I recommend that you return to clinic fasting (nothing to eat or drink) and have them rechecked.  You may need to be started on cholesterol medication.  We also discussed that it's okay to continue your testosterone of 0.3 mL (60 mg) as you had been doing.  I will call you with results of your repeat cholesterol/triglycerides after you return to clinic and have labs drawn.  You do not need an appointment with a provider with this.  Labs can be drawn anytime between 8-5 pm.     Thank you for getting your care at [facility]. Please see below for your test results.    Resulted Orders   Testosterone Total   Result Value Ref Range    Testosterone Total 339 (H) 8 - 60 ng/dL      Comment:      This test was developed and its performance characteristics determined by the   [facility],  Special Chemistry Laboratory. It has   not been cleared or approved by the FDA. The laboratory is regulated under   CLIA as qualified to perform high-complexity testing. This test is used for   clinical purposes. It should not be regarded as investigational or for   research.     Hepatic Panel   Result Value Ref Range    Albumin 4.2 3.8 - 5.0 mg/dL    Alkaline Phosphatase 93.0 31.7 - 110.5 U/L    ALT 16.9 0.0 - 45.0 U/L    AST 21.4 0.0 - 45.0 U/L    Bilirubin Direct 0.2 0.1 - 0.3 mg/dL    Bilirubin Total <0.4 0.2 - 1.3 mg/dL    Protein Total 7.8 6.8 - 8.8 g/dL   CBC with Diff Plt   Result Value Ref Range    WBC 9.3 4.0 - 11.0 K/uL    Lymphocytes # 2.1 0.8 - 5.3 K/uL    % Lymphocytes 22.4 20.0 - 48.0 %L    Mid # 0.7 0.0 - 2.2 K/uL    Mid % 7.1 0.0 - 20.0 %M    GRANULOCYTES # 6.6 1.6 - 8.3 K/uL    % Granulocytes 70.5 40.0 - 75.0 %G    RBC 5.12 3.80 - 5.20 M/uL    Hemoglobin 15.6 11.7 - 15.7 g/dL     Hematocrit 49.4 (H) 35.0 - 47.0 %    MCV 96.4 78.0 - 100.0 fL    MCH 31.1 26.5 - 35.0 pg    MCHC 32.2 32.0 - 36.0 g/dL    Platelets 336.0 150.0 - 450.0 K/uL   Lipid Cascade   Result Value Ref Range    Cholesterol 200.9 (H) 0.0 - 200.0 mg/dL    Cholesterol/HDL Ratio 6.7 (H) 0.0 - 5.0    HDL Cholesterol 30.1 (L) >40.0 mg/dL    LDL Cholesterol Calculated 106 0 - 129 mg/dL    Triglycerides 324.9 (H) 0.0 - 150.0 mg/dL    VLDL Cholesterol 65.0 (H) 7.0 - 32.0 mg/dL       If you have any concerns about these results please call and leave a message for me or send a MyChart message to the clinic.    Sincerely,    Tiffany Hernandez MD

## 2018-02-08 NOTE — MR AVS SNAPSHOT
After Visit Summary   2/8/2018    Jesus Hurd    MRN: [id number]           Patient Information     Date Of Birth          1980        Visit Information        Provider Department      2/8/2018 2:00 PM Tiffany Hernandez MD [facility]        Today's Diagnoses     Gender dysphoria in adult    -  1      Care Instructions    Here is the plan from today's visit    1. Gender dysphoria in adult  We will get your 6 month labs today.  If all is okay, Dr. Machado may be able to go up on the dose.    - testosterone cypionate (DEPOTESTOTERONE) 200 MG/ML injection; Inject 0.25 mLs (50 mg) into the muscle once a week  Dispense: 1 mL; Refill: 0  - Testosterone Total  - Hepatic Panel  - CBC with Diff Plt  - Lipid Cascade  - testosterone cypionate (DEPOTESTOTERONE) 200 MG/ML injection; Inject 0.25 mLs (50 mg) into the muscle once a week  Dispense: 1 mL; Refill: 0    Please call or return to clinic if your symptoms don't go away.    Follow up plan  Please make a clinic appointment for follow up with your primary physician Emerson Machado in 6 months or earlier if notified.  Thank you for coming to [facility] today.  Lab Testing:  **If you had lab testing today and your results are reassuring or normal they will be mailed to you or sent through [employer] within 7 days.   **If the lab tests need quick action we will call you with the results.  The phone number we will call with results is # [phone] (home) . If this is not the best number please call our clinic and change the number.  Medication Refills:  If you need any refills please call your pharmacy and they will contact us.   If you need to  your refill at a new pharmacy, please contact the new pharmacy directly. The new pharmacy will help you get your medications transferred faster.   Scheduling:  If you have any concerns about today's visit or wish to schedule another appointment please call our office during normal  business hours [phone] (8-5:00 M-F)  If a referral was made to a [facility] and you don't get a call from central scheduling please call [phone].  If a Mammogram was ordered for you at [facility] call [phone] to schedule or change your appointment.  If you had an XRay/CT/Ultrasound/MRI ordered the number is [phone] to schedule or change your radiology appointment.   Medical Concerns:  If you have urgent medical concerns please call [phone] at any time of the day.            Follow-ups after your visit        Who to contact     Please call your clinic at [phone] to:    Ask questions about your health    Make or cancel appointments    Discuss your medicines    Learn about your test results    Speak to your doctor            Additional Information About Your Visit        [employer] Information     Backand is an electronic gateway that provides easy, online access to your medical records. With Backand, you can request a clinic appointment, read your test results, renew a prescription or communicate with your care team.     To sign up for Backand visit the website at [email or web address]   You will be asked to enter the access code listed below, as well as some personal information. Please follow the directions to create your username and password.     Your access code is: [email or web address]  Expires: 2018  2:22 PM     Your access code will  in 90 days. If you need help or a new code, please contact your [facility] or call [phone] for assistance.        Care EveryWhere ID     This is your Care EveryWhere ID. This could be used by other organizations to access your [facility] medical records  [id number]        Your Vitals Were     Pulse Temperature Respirations Pulse Oximetry Breastfeeding? BMI (Body Mass Index)    93 98.1  F (36.7  C) (Oral) 18 98% No 39.94 kg/m2       Blood Pressure from Last 3 Encounters:   18  118/76   11/10/17 129/82   10/11/17 111/70    Weight from Last 3 Encounters:   02/08/18 240 lb (108.9 kg)   11/10/17 222 lb (100.7 kg)   10/11/17 221 lb 9.6 oz (100.5 kg)              We Performed the Following     CBC with Diff Plt     Hepatic Panel     Lipid Cascade     Testosterone Total          Today's Medication Changes          These changes are accurate as of 2/8/18  2:22 PM.  If you have any questions, ask your nurse or doctor.               Start taking these medicines.        Dose/Directions    * testosterone cypionate 200 MG/ML injection   Commonly known as:  DEPOTESTOTERONE   Used for:  Gender dysphoria in adult   Started by:  Tiffany Hernandez MD        Dose:  50 mg   Start taking on:  4/8/2018   Inject 0.25 mLs (50 mg) into the muscle once a week   Quantity:  1 mL   Refills:  0       * testosterone cypionate 200 MG/ML injection   Commonly known as:  DEPOTESTOTERONE   Used for:  Gender dysphoria in adult   Started by:  Tiffany Hernandez MD        Dose:  50 mg   Start taking on:  4/8/2018   Inject 0.25 mLs (50 mg) into the muscle once a week   Quantity:  1 mL   Refills:  0       * Notice:  This list has 2 medication(s) that are the same as other medications prescribed for you. Read the directions carefully, and ask your doctor or other care provider to review them with you.         Where to get your medicines      Some of these will need a paper prescription and others can be bought over the counter.  Ask your nurse if you have questions.     Bring a paper prescription for each of these medications     testosterone cypionate 200 MG/ML injection    testosterone cypionate 200 MG/ML injection                Primary Care Provider Office Phone # Fax #    Emerson Machado [phone]       [address] [address]        Equal Access to Services     DAVEY MIRELES AH: mele Bailey qaybta kaalmada adeegyada, waxay idiin hayaan  "debby craigmykel ah. So [facility] [phone].    ATENCIÓN: Si jeraldla aleksandr, tiene a madera disposición servicios gratuitos de asistencia lingüística. Chacho al [phone].    We comply with applicable federal civil rights laws and [place] laws. We do not discriminate on the basis of race, color, national origin, age, disability, sex, sexual orientation, or gender identity.            Thank you!     Thank you for choosing [facility]  for your care. Our goal is always to provide you with excellent care. Hearing back from our patients is one way we can continue to improve our services. Please take a few minutes to complete the written survey that you may receive in the mail after your visit with us. Thank you!             Your Updated Medication List - Protect others around you: Learn how to safely use, store and throw away your medicines at www.disposemymeds.org.          This list is accurate as of 2/8/18  2:22 PM.  Always use your most recent med list.                   Brand Name Dispense Instructions for use Diagnosis    ARIPiprazole 9.75 MG/1.3ML injection    ABILIFY     Inject 10 mg into the muscle once        CITALOPRAM HYDROBROMIDE PO      Take 20 mg by mouth daily    Gender dysphoria in adult       gabapentin 100 MG capsule    NEURONTIN    1 capsule    Take 1 capsule (100 mg) by mouth 2 times daily 100-300mg at night        LATUDA 80 MG Tabs tablet   Generic drug:  lurasidone     1 tablet    Take 1 tablet (80 mg) by mouth daily        * needle (disp) 18G X 1\" Misc     25 each    Use to draw up hormones once weekly    Gender dysphoria in adult       * needle (disp) 18G X 1\" Misc     25 each    Use to draw up hormones once weekly    Gender dysphoria in adult       * Needle (Disp) 25G X 1-1/2\" Misc     25 each    Use once weekly for administering hormone IM    Gender dysphoria in adult       * Needle (Disp) 25G X 1-1/2\" Misc     25 each    Use once weekly for administering hormone IM    " Gender dysphoria in adult       * syringe (disposable) 1 ML Misc     25 each    Use once weekly to draw up hormones    Gender dysphoria in adult       * syringe (disposable) 1 ML Misc     25 each    Use once weekly to draw up hormones    Gender dysphoria in adult       * testosterone cypionate 200 MG/ML injection   Start taking on:  4/8/2018    DEPOTESTOTERONE    1 mL    Inject 0.25 mLs (50 mg) into the muscle once a week    Gender dysphoria in adult       * testosterone cypionate 200 MG/ML injection   Start taking on:  4/8/2018    DEPOTESTOTERONE    1 mL    Inject 0.25 mLs (50 mg) into the muscle once a week    Gender dysphoria in adult       TYLENOL PO      Take 325 mg by mouth every 4 hours as needed        * Notice:  This list has 8 medication(s) that are the same as other medications prescribed for you. Read the directions carefully, and ask your doctor or other care provider to review them with you.

## 2018-02-08 NOTE — PATIENT INSTRUCTIONS
Here is the plan from today's visit    1. Gender dysphoria in adult  We will get your 6 month labs today.  If all is okay, Dr. Machado may be able to go up on the dose.    - testosterone cypionate (DEPOTESTOTERONE) 200 MG/ML injection; Inject 0.25 mLs (50 mg) into the muscle once a week  Dispense: 1 mL; Refill: 0  - Testosterone Total  - Hepatic Panel  - CBC with Diff Plt  - Lipid Cascade  - testosterone cypionate (DEPOTESTOTERONE) 200 MG/ML injection; Inject 0.25 mLs (50 mg) into the muscle once a week  Dispense: 1 mL; Refill: 0    Please call or return to clinic if your symptoms don't go away.    Follow up plan  Please make a clinic appointment for follow up with your primary physician Emerson Machado in 6 months or earlier if notified.  Thank you for coming to [facility] today.  Lab Testing:  **If you had lab testing today and your results are reassuring or normal they will be mailed to you or sent through [employer] within 7 days.   **If the lab tests need quick action we will call you with the results.  The phone number we will call with results is # [phone] (home) . If this is not the best number please call our clinic and change the number.  Medication Refills:  If you need any refills please call your pharmacy and they will contact us.   If you need to  your refill at a new pharmacy, please contact the new pharmacy directly. The new pharmacy will help you get your medications transferred faster.   Scheduling:  If you have any concerns about today's visit or wish to schedule another appointment please call our office during normal business hours [phone] (8-5:00 M-F)  If a referral was made to a [facility] and you don't get a call from central scheduling please call [phone].  If a Mammogram was ordered for you at [facility] call [phone] to schedule or change your appointment.  If you had an XRay/CT/Ultrasound/MRI ordered the number is [phone] to  schedule or change your radiology appointment.   Medical Concerns:  If you have urgent medical concerns please call [phone] at any time of the day.

## 2018-02-10 LAB — TESTOST SERPL-MCNC: 339 NG/DL (ref 8–60)

## 2018-02-13 ENCOUNTER — TELEPHONE (OUTPATIENT)
Dept: FAMILY MEDICINE | Facility: CLINIC | Age: 38
End: 2018-02-13

## 2018-02-13 DIAGNOSIS — E78.2 ELEVATED CHOLESTEROL WITH ELEVATED TRIGLYCERIDES: Primary | ICD-10-CM

## 2018-02-13 NOTE — TELEPHONE ENCOUNTER
Called patient to discuss lab results from last visits and LVM for him to call clinic.  Based on last set of labs from clinic visit last week and my discussion with his PCP, Dr. Machado, it is okay for him to continue the 0.3 mL of Testosterone for now (documentation showed that he was taking 0.25 mL but he stated he was taking 0.3 mL).  I have also ordered a lipid panel to be redrawn while he is fasting.  His triglycerides were elevated, so he should have labs redrawn when fasting.  He may need to be started on cholesterol medication based on repeat labs.  I have ordered the labs.  He can come in any time (fasting) and I will notify him of results.  For his HRT, he can follow up in 6 months.    Tiffany Hernandez M.D.  PGY-3, Family Medicine

## 2018-04-06 ENCOUNTER — TELEPHONE (OUTPATIENT)
Dept: FAMILY MEDICINE | Facility: CLINIC | Age: 38
End: 2018-04-06

## 2018-04-06 NOTE — TELEPHONE ENCOUNTER
Date of last visit at clinic: 02/08/18    Please complete refill and CLOSE ENCOUNTER.  Closing the encounter signifies the refill is complete.

## 2018-04-06 NOTE — TELEPHONE ENCOUNTER
Refill called into [facility] for 3 months refills. Pt will need to have appointment in 3 months as planned for office visit and further medication refills.     Emerson Machado DO, MA  Family Medicine PGY-1  [facility], [facility]   Pager: [phone]

## 2018-05-04 NOTE — TELEPHONE ENCOUNTER
[facility] phone call message- patient requesting a refill:    Full Medication Name: testosterone cypionate (DEPOTESTOTERONE) 200 MG/ML injection        Pharmacy confirmed as   [id number] - [place], [place] - [address] [place] [address]  Phone: [phone] Fax: [phone]  : Yes    Additional Comments: None     OK to leave a message on voice mail? Yes    Primary language: English      needed? No    Call taken on August 31, 2017 at 11:05 AM by Rebekah Whitten     04-May-2018 21:12

## 2018-07-06 ENCOUNTER — OFFICE VISIT (OUTPATIENT)
Dept: FAMILY MEDICINE | Facility: CLINIC | Age: 38
End: 2018-07-06

## 2018-07-06 VITALS
RESPIRATION RATE: 16 BRPM | DIASTOLIC BLOOD PRESSURE: 72 MMHG | OXYGEN SATURATION: 98 % | BODY MASS INDEX: 40.2 KG/M2 | SYSTOLIC BLOOD PRESSURE: 114 MMHG | HEART RATE: 63 BPM | TEMPERATURE: 97.7 F | WEIGHT: 241.6 LBS

## 2018-07-06 DIAGNOSIS — E78.2 MIXED HYPERLIPIDEMIA: ICD-10-CM

## 2018-07-06 DIAGNOSIS — F64.0 GENDER DYSPHORIA IN ADULT: Primary | ICD-10-CM

## 2018-07-06 DIAGNOSIS — Z00.00 HEALTHCARE MAINTENANCE: ICD-10-CM

## 2018-07-06 LAB
% GRANULOCYTES: 63.2 %G (ref 40–75)
ALBUMIN SERPL-MCNC: 4.5 MG/DL (ref 3.8–5)
ALP SERPL-CCNC: 109.4 U/L (ref 31.7–110.5)
ALT SERPL-CCNC: 18.3 U/L (ref 0–45)
AST SERPL-CCNC: 16.1 U/L (ref 0–45)
BILIRUB SERPL-MCNC: <0.4 MG/DL (ref 0.2–1.3)
BILIRUBIN DIRECT: 0.1 MG/DL (ref 0.1–0.3)
CHOLEST SERPL-MCNC: 201.7 MG/DL (ref 0–200)
CHOLEST/HDLC SERPL: 5.9 {RATIO} (ref 0–5)
GRANULOCYTES #: 5.4 K/UL (ref 1.6–8.3)
HCT VFR BLD AUTO: 49.4 % (ref 35–47)
HDLC SERPL-MCNC: 34.1 MG/DL
HEMOGLOBIN: 14.9 G/DL (ref 11.7–15.7)
LDLC SERPL CALC-MCNC: 127 MG/DL (ref 0–129)
LYMPHOCYTES # BLD AUTO: 2.4 K/UL (ref 0.8–5.3)
LYMPHOCYTES NFR BLD AUTO: 28.2 %L (ref 20–48)
MCH RBC QN AUTO: 29.7 PG (ref 26.5–35)
MCHC RBC AUTO-ENTMCNC: 30.2 G/DL (ref 32–36)
MCV RBC AUTO: 98.7 FL (ref 78–100)
MID #: 0.7 K/UL (ref 0–2.2)
MID %: 8.6 %M (ref 0–20)
PLATELET # BLD AUTO: 306 K/UL (ref 150–450)
PROT SERPL-MCNC: 7.9 G/DL (ref 6.8–8.8)
RBC # BLD AUTO: 5.01 M/UL (ref 3.8–5.2)
TRIGL SERPL-MCNC: 202.6 MG/DL (ref 0–150)
VLDL CHOLESTEROL: 40.5 MG/DL (ref 7–32)
WBC # BLD AUTO: 8.6 K/UL (ref 4–11)

## 2018-07-06 RX ORDER — ATORVASTATIN CALCIUM 20 MG/1
20 TABLET, FILM COATED ORAL DAILY
Qty: 30 TABLET | Refills: 3 | Status: SHIPPED | OUTPATIENT
Start: 2018-07-06 | End: 2018-10-25

## 2018-07-06 RX ORDER — TESTOSTERONE CYPIONATE 200 MG/ML
60 INJECTION, SOLUTION INTRAMUSCULAR WEEKLY
Qty: 1 ML | Refills: 5 | Status: SHIPPED | OUTPATIENT
Start: 2018-07-06 | End: 2018-12-07

## 2018-07-06 ASSESSMENT — ANXIETY QUESTIONNAIRES
2. NOT BEING ABLE TO STOP OR CONTROL WORRYING: NOT AT ALL
1. FEELING NERVOUS, ANXIOUS, OR ON EDGE: SEVERAL DAYS
7. FEELING AFRAID AS IF SOMETHING AWFUL MIGHT HAPPEN: NOT AT ALL
3. WORRYING TOO MUCH ABOUT DIFFERENT THINGS: NOT AT ALL
5. BEING SO RESTLESS THAT IT IS HARD TO SIT STILL: NOT AT ALL
IF YOU CHECKED OFF ANY PROBLEMS ON THIS QUESTIONNAIRE, HOW DIFFICULT HAVE THESE PROBLEMS MADE IT FOR YOU TO DO YOUR WORK, TAKE CARE OF THINGS AT HOME, OR GET ALONG WITH OTHER PEOPLE: SOMEWHAT DIFFICULT
6. BECOMING EASILY ANNOYED OR IRRITABLE: NOT AT ALL
GAD7 TOTAL SCORE: 2

## 2018-07-06 ASSESSMENT — PATIENT HEALTH QUESTIONNAIRE - PHQ9: 5. POOR APPETITE OR OVEREATING: SEVERAL DAYS

## 2018-07-06 NOTE — LETTER
July 9, 2018      Jesus Hurd  [address]  [place] [address]        Dear Jesus,    Thank you for getting your care at [facility]. Please see below for your test results.    Resulted Orders   Testosterone Total   Result Value Ref Range    Testosterone Total 313 (H) 8 - 60 ng/dL      Comment:      This test was developed and its performance characteristics determined by the   [facility],  Special Chemistry Laboratory. It has   not been cleared or approved by the FDA. The laboratory is regulated under   CLIA as qualified to perform high-complexity testing. This test is used for   clinical purposes. It should not be regarded as investigational or for   research.     HIV Antigen Antibody Combo   Result Value Ref Range    HIV Antigen Antibody Combo Nonreactive NR^Nonreactive          Comment:      HIV-1 p24 Ag & HIV-1/HIV-2 Ab Not Detected       If you have any questions, please call the clinic to make an appointment with Dr. Machado for a clinic visit.    Sincerely,    Brittany Coppola MD

## 2018-07-06 NOTE — MR AVS SNAPSHOT
After Visit Summary   7/6/2018    Jesus Hurd    MRN: [id number]           Patient Information     Date Of Birth          1980        Visit Information        Provider Department      7/6/2018 9:20 AM Emerson Machado, DO [facility]        Today's Diagnoses     Gender dysphoria in adult    -  1    Healthcare maintenance          Care Instructions    Here is the plan from today's visit    1. Gender dysphoria in adult  If you need to start a med for cholesterol, I will call you.  - Lipid Cascade  - CBC with Diff Plt  - Hepatic Panel  - Testosterone Total  - testosterone cypionate (DEPOTESTOTERONE) 200 MG/ML injection; Inject 0.3 mLs (60 mg) into the muscle once a week  Dispense: 1 mL; Refill: 5    2. Healthcare maintenance  - HIV Antigen Antibody Combo    Please call or return to clinic if your symptoms don't go away.    Follow up plan  Please make a clinic appointment for follow up with me (EMERSON MACHADO) in 5.5  months for HRT.    Thank you for coming to [facility] today.  Lab Testing:  **If you had lab testing today and your results are reassuring or normal they will be mailed to you or sent through [employer]oz within 7 days.   **If the lab tests need quick action we will call you with the results.  The phone number we will call with results is # [phone] (home) . If this is not the best number please call our clinic and change the number.  Medication Refills:  If you need any refills please call your pharmacy and they will contact us.   If you need to  your refill at a new pharmacy, please contact the new pharmacy directly. The new pharmacy will help you get your medications transferred faster.   Scheduling:  If you have any concerns about today's visit or wish to schedule another appointment please call our office during normal business hours [phone] (8-5:00 M-F)  If a referral was made to a [facility] and you don't get a call from  central scheduling please call [phone].  If a Mammogram was ordered for you at [facility] call [phone] to schedule or change your appointment.  If you had an XRay/CT/Ultrasound/MRI ordered the number is [phone] to schedule or change your radiology appointment.   Medical Concerns:  If you have urgent medical concerns please call [phone] at any time of the day.    Emerson Machado, DO            Follow-ups after your visit        Follow-up notes from your care team     Return in about 6 months (around 2019).      Who to contact     Please call your clinic at [phone] to:    Ask questions about your health    Make or cancel appointments    Discuss your medicines    Learn about your test results    Speak to your doctor            Additional Information About Your Visit        Ex[employer] Information     PiCloud is an electronic gateway that provides easy, online access to your medical records. With PiCloud, you can request a clinic appointment, read your test results, renew a prescription or communicate with your care team.     To sign up for PiCloud visit the website at [email or web address]   You will be asked to enter the access code listed below, as well as some personal information. Please follow the directions to create your username and password.     Your access code is: [email or web address]  Expires: 10/4/2018 10:01 AM     Your access code will  in 90 days. If you need help or a new code, please contact your [facility] or call [phone] for assistance.        Care EveryWhere ID     This is your Care EveryWhere ID. This could be used by other organizations to access your [facility] medical records  [id number]        Your Vitals Were     Pulse Temperature Respirations Pulse Oximetry BMI (Body Mass Index)       63 97.7  F (36.5  C) (Oral) 16 98% 40.2 kg/m2        Blood Pressure from Last 3 Encounters:   18 114/72   18 118/76   11/10/17  129/82    Weight from Last 3 Encounters:   07/06/18 241 lb 9.6 oz (109.6 kg)   02/08/18 240 lb (108.9 kg)   11/10/17 222 lb (100.7 kg)              We Performed the Following     CBC with Diff Plt     Hepatic Panel     HIV Antigen Antibody Combo     Lipid Cascade     Testosterone Total          Today's Medication Changes          These changes are accurate as of 7/6/18 10:03 AM.  If you have any questions, ask your nurse or doctor.               These medicines have changed or have updated prescriptions.        Dose/Directions    testosterone cypionate 200 MG/ML injection   Commonly known as:  DEPOTESTOTERONE   This may have changed:  how much to take   Used for:  Gender dysphoria in adult   Changed by:  Emerson Machado DO        Dose:  60 mg   Inject 0.3 mLs (60 mg) into the muscle once a week   Quantity:  1 mL   Refills:  5            Where to get your medicines      Some of these will need a paper prescription and others can be bought over the counter.  Ask your nurse if you have questions.     Bring a paper prescription for each of these medications     testosterone cypionate 200 MG/ML injection                Primary Care Provider Office Phone # Fax #    Emerson Machado [phone]       [place] [address]        Equal Access to Services     DAVEY MIRELES AH: Kristi Cox, wadoris easley, qajuniorta kaalmada ademinor, dejon travis. So [facility] [phone].    ATENCIÓN: Si habla español, tiene a madera disposición servicios gratuitos de asistencia lingüística. Chacho al [phone].    We comply with applicable federal civil rights laws and [place] laws. We do not discriminate on the basis of race, color, national origin, age, disability, sex, sexual orientation, or gender identity.            Thank you!     Thank you for choosing [facility]  for your care. Our goal is always to provide you with excellent  "care. Hearing back from our patients is one way we can continue to improve our services. Please take a few minutes to complete the written survey that you may receive in the mail after your visit with us. Thank you!             Your Updated Medication List - Protect others around you: Learn how to safely use, store and throw away your medicines at www.disposemymeds.org.          This list is accurate as of 7/6/18 10:03 AM.  Always use your most recent med list.                   Brand Name Dispense Instructions for use Diagnosis    ARIPiprazole 9.75 MG/1.3ML injection    ABILIFY     Inject 10 mg into the muscle once        CITALOPRAM HYDROBROMIDE PO      Take 20 mg by mouth daily    Gender dysphoria in adult       gabapentin 100 MG capsule    NEURONTIN    1 capsule    Take 1 capsule (100 mg) by mouth 2 times daily 100-300mg at night        LATUDA 80 MG Tabs tablet   Generic drug:  lurasidone     1 tablet    Take 1 tablet (80 mg) by mouth daily        * needle (disp) 18G X 1\" Misc     25 each    Use to draw up hormones once weekly    Gender dysphoria in adult       * needle (disp) 18G X 1\" Misc     25 each    Use to draw up hormones once weekly    Gender dysphoria in adult       * Needle (Disp) 25G X 1-1/2\" Misc     25 each    Use once weekly for administering hormone IM    Gender dysphoria in adult       * Needle (Disp) 25G X 1-1/2\" Misc     25 each    Use once weekly for administering hormone IM    Gender dysphoria in adult       * syringe (disposable) 1 ML Misc     25 each    Use once weekly to draw up hormones    Gender dysphoria in adult       * syringe (disposable) 1 ML Misc     25 each    Use once weekly to draw up hormones    Gender dysphoria in adult       testosterone cypionate 200 MG/ML injection    DEPOTESTOTERONE    1 mL    Inject 0.3 mLs (60 mg) into the muscle once a week    Gender dysphoria in adult       TYLENOL PO      Take 325 mg by mouth every 4 hours as needed        * Notice:  This list has 6 " medication(s) that are the same as other medications prescribed for you. Read the directions carefully, and ask your doctor or other care provider to review them with you.

## 2018-07-06 NOTE — PROGRESS NOTES
"            SHAMEKA Lee is a 37 year old individual that uses pronouns He/Him/His/Himself that presents today for follow up of:  masculinizing hormone therapy. Gender identity: male    Any special concerns today?  concerns about dosage of T, clarify    On hormones?  YES +++ Shot day of the week? Saturday     Due for labs?  Yes      +++ Refills of meds needed?  Yes  ---    Past Surgical History:   Procedure Laterality Date     BREAST SURGERY       ENT SURGERY  10/24/2017    Plastic Nose Repair       Patient Active Problem List   Diagnosis     Bipolar 1 disorder (H)     Anxiety     Gender dysphoria in adult     PTSD (post-traumatic stress disorder)     H/O: attempted suicide     Methamphetamine abuse in remission     Chondromalacia patellae of right knee       Current Outpatient Prescriptions   Medication Sig Dispense Refill     Acetaminophen (TYLENOL PO) Take 325 mg by mouth every 4 hours as needed        ARIPiprazole (ABILIFY) 9.75 MG/1.3ML injection Inject 10 mg into the muscle once       CITALOPRAM HYDROBROMIDE PO Take 20 mg by mouth daily       gabapentin (NEURONTIN) 100 MG capsule Take 1 capsule (100 mg) by mouth 2 times daily 100-300mg at night 1 capsule 0     lurasidone (LATUDA) 80 MG TABS tablet Take 1 tablet (80 mg) by mouth daily 1 tablet 0     needle, disp, 18G X 1\" MISC Use to draw up hormones once weekly 25 each 3     needle, disp, 18G X 1\" MISC Use to draw up hormones once weekly (Patient not taking: Reported on 7/6/2018) 25 each 3     Needle, Disp, 25G X 1-1/2\" MISC Use once weekly for administering hormone IM 25 each 3     Needle, Disp, 25G X 1-1/2\" MISC Use once weekly for administering hormone IM (Patient not taking: Reported on 7/6/2018) 25 each 3     syringe, disposable, 1 ML MISC Use once weekly to draw up hormones 25 each 3     syringe, disposable, 1 ML MISC Use once weekly to draw up hormones (Patient not taking: Reported on 7/6/2018) 25 each 3     testosterone cypionate (DEPOTESTOTERONE) 200 " "MG/ML injection Inject 0.25 mLs (50 mg) into the muscle once a week (Patient not taking: Reported on 7/6/2018) 1 mL 0     testosterone cypionate (DEPOTESTOTERONE) 200 MG/ML injection Inject 0.25 mLs (50 mg) into the muscle once a week 1 mL 0       History   Smoking Status     Current Every Day Smoker     Packs/day: 0.50     Years: 21.00   Smokeless Tobacco     Never Used     Comment: using nicotine inhaler which helps          Allergies   Allergen Reactions     Nickel Itching     Seroquel [Quetiapine] Other (See Comments)     Restless leg syndrom       Problem, Medication and Allergy Lists were reviewed and updated if needed..         Review of Systems:      General    Fat redistribution: YES    Weight change: YES, down 7 lbs HEENT    Voice change: YES     Cardiovascular (CV)    Chest Pains: no    Shortness of breath: no Chest    Decreased exercise tolerance:  no    Breast changes/development: no     Gastrointestinal (GI)    Abdominal pain: no    Change in appetite: no, but increased Skin    Acne or oily skin: YES    Change in hair: waiting on facial hair, starting extremities and chest     Genitourinary ()    Abnormal vaginal bleeding: no, no periods     Decreased spontaneous erections: not applicable    Change in libido: YES, inc    New sexual partners: no Musculoskeletal    Leg pain or swelling: no     Psychiatric (Psych)    Depression: no    Anxiety/Panic: no    Mood:  \"good\"                    Physical Exam:     Vitals:    07/06/18 0929   BP: 114/72   BP Location: Left arm   Patient Position: Sitting   Cuff Size: Adult Large   Pulse: 63   Resp: 16   Temp: 97.7  F (36.5  C)   TempSrc: Oral   SpO2: 98%   Weight: 241 lb 9.6 oz (109.6 kg)     BMI= Body mass index is 40.2 kg/(m^2).   Wt Readings from Last 10 Encounters:   07/06/18 241 lb 9.6 oz (109.6 kg)   02/08/18 240 lb (108.9 kg)   11/10/17 222 lb (100.7 kg)   10/11/17 221 lb 9.6 oz (100.5 kg)   09/07/17 212 lb 9.6 oz (96.4 kg)   08/11/17 208 lb 3.2 oz (94.4 " kg)   07/21/17 205 lb 6.4 oz (93.2 kg)   06/23/17 197 lb (89.4 kg)     Appearance: Male appearance and dress    GENERAL:: healthy, alert and no distress  EYES: Eyes grossly normal to inspection, fundi benign and PERRL  NECK: no adenopathy, no asymmetry, no masses,  and thyroid normal to palpation, supple  RESP: lungs clear to auscultation - no rales, no rhonchi, no wheezes  CV: regular rates and rhythm, normal S1 S2, no S3 or S4 and no murmur, no click or rub -  MS: extremities normal- no gross deformities noted, no edema  SKIN: no suspicious lesions, no rashes  NEURO: Normal strength and tone, sensory exam grossly normal, mentation intact and speech normal, reflexes symmetric  Psych: Alert and oriented times 3; coherent speech, normal rate and volume, able to articulate logical thoughts, able to abstract reason, no tangential thoughts, no hallucinations or delusions. Affect is normal, bright.  Affect: Appropriate/mood-congruent           Labs:      Results from the last 24 hours  Results for orders placed or performed in visit on 07/06/18 (from the past 24 hour(s))   Lipid Cascade   Result Value Ref Range    Cholesterol 201.7 (H) 0.0 - 200.0 mg/dL    Cholesterol/HDL Ratio 5.9 (H) 0.0 - 5.0    HDL Cholesterol 34.1 (L) >40.0 mg/dL    LDL Cholesterol Calculated 127 0 - 129 mg/dL    Triglycerides 202.6 (H) 0.0 - 150.0 mg/dL    VLDL Cholesterol 40.5 (H) 7.0 - 32.0 mg/dL   CBC with Diff Plt   Result Value Ref Range    WBC 8.6 4.0 - 11.0 K/uL    Lymphocytes # 2.4 0.8 - 5.3 K/uL    % Lymphocytes 28.2 20.0 - 48.0 %L    Mid # 0.7 0.0 - 2.2 K/uL    Mid % 8.6 0.0 - 20.0 %M    GRANULOCYTES # 5.4 1.6 - 8.3 K/uL    % Granulocytes 63.2 40.0 - 75.0 %G    RBC 5.01 3.80 - 5.20 M/uL    Hemoglobin 14.9 11.7 - 15.7 g/dL    Hematocrit 49.4 (H) 35.0 - 47.0 %    MCV 98.7 78.0 - 100.0 fL    MCH 29.7 26.5 - 35.0 pg    MCHC 30.2 (L) 32.0 - 36.0 g/dL    Platelets 306.0 150.0 - 450.0 K/uL   Hepatic Panel   Result Value Ref Range    Albumin 4.5  3.8 - 5.0 mg/dL    Alkaline Phosphatase 109.4 31.7 - 110.5 U/L    ALT 18.3 0.0 - 45.0 U/L    AST 16.1 0.0 - 45.0 U/L    Bilirubin Direct 0.1 0.1 - 0.3 mg/dL    Bilirubin Total <0.4 0.2 - 1.3 mg/dL    Protein Total 7.9 6.8 - 8.8 g/dL       Assessment and Plan     Jesus was seen today for recheck.    Diagnoses and all orders for this visit:    Gender dysphoria in adult  -     Lipid Cascade- see above  -     CBC with Diff Plt- WNL  -     Hepatic Panel- wnl  -     Testosterone Total- pending   -     testosterone cypionate (DEPOTESTOTERONE) 200 MG/ML injection; Inject 0.3 mLs (60 mg) into the muscle once a week    Patient has been on hormones since July 2017.  Has noticed slow progression of changes, and is feeling less gender dysphoric.  Presenting male.  Testosterone has been taken at 60 mg a week, shot day on now Saturday.  CBC and hepatic panel within normal limits.  Testosterone pending and expected to be low as shot day is tomorrow.  Goal is 300-1000.  Did discuss sexual concerns.  There is a discongruence with wife, they are working through this marriage counseling.  Offered psychotherapy, patient will consider for next time.  Recommending follow-up in 6 months.      Healthcare maintenance  -     HIV Antigen Antibody Combo- pending     Mixed hyperlipidemia  Lipids elevated, fasting, started on statin today.     -     atorvastatin (LIPITOR) 20 MG tablet; Take 1 tablet (20 mg) by mouth daily      Contraception:   not needed  .   Counselled patient about controlled substances: Yes. Paper Rx and 6 month supply limited.    Follow up:  Follow up in 6 months.  Results by mychart/ phone  Questions were elicited and answered.     Emerson Machado DO, MA  Family Medicine PGY-2  [facility], [facility]   Pager: [phone]

## 2018-07-06 NOTE — PATIENT INSTRUCTIONS
Here is the plan from today's visit    1. Gender dysphoria in adult  If you need to start a med for cholesterol, I will call you.  - Lipid Cascade  - CBC with Diff Plt  - Hepatic Panel  - Testosterone Total  - testosterone cypionate (DEPOTESTOTERONE) 200 MG/ML injection; Inject 0.3 mLs (60 mg) into the muscle once a week  Dispense: 1 mL; Refill: 5    2. Healthcare maintenance  - HIV Antigen Antibody Combo    Please call or return to clinic if your symptoms don't go away.    Follow up plan  Please make a clinic appointment for follow up with me (VLADIMIR KAISER) in 5.5  months for HRT.    Thank you for coming to [facility] today.  Lab Testing:  **If you had lab testing today and your results are reassuring or normal they will be mailed to you or sent through [employer] within 7 days.   **If the lab tests need quick action we will call you with the results.  The phone number we will call with results is # [phone] (home) . If this is not the best number please call our clinic and change the number.  Medication Refills:  If you need any refills please call your pharmacy and they will contact us.   If you need to  your refill at a new pharmacy, please contact the new pharmacy directly. The new pharmacy will help you get your medications transferred faster.   Scheduling:  If you have any concerns about today's visit or wish to schedule another appointment please call our office during normal business hours [phone] (8-5:00 M-F)  If a referral was made to a [facility] and you don't get a call from central scheduling please call [phone].  If a Mammogram was ordered for you at [facility] call [phone] to schedule or change your appointment.  If you had an XRay/CT/Ultrasound/MRI ordered the number is [phone] to schedule or change your radiology appointment.   Medical Concerns:  If you have urgent medical concerns please call [phone] at any time of the  day.    Emerson Machado, DO

## 2018-07-06 NOTE — LETTER
July 8, 2018      Jesus Hurd  [address] [place] [address]        Dear Jesus,    Thank you for getting your care at [facility]. Please see below for your test results. They are reassuring.    Resulted Orders   HIV Antigen Antibody Combo   Result Value Ref Range    HIV Antigen Antibody Combo Nonreactive NR^Nonreactive          Comment:      HIV-1 p24 Ag & HIV-1/HIV-2 Ab Not Detected       If you have any questions, please call the clinic to make an appointment with your primary care physician, Dr. Machado, for a clinic visit.    Sincerely,    Brittany Coppola MD

## 2018-07-06 NOTE — PROGRESS NOTES
Preceptor Attestation:   Patient seen, evaluated and discussed with the resident. I have verified the content of the note, which accurately reflects my assessment of the patient and the plan of care.   Supervising Physician:  Neo Gallegos MD

## 2018-07-07 ASSESSMENT — ANXIETY QUESTIONNAIRES: GAD7 TOTAL SCORE: 2

## 2018-07-07 ASSESSMENT — PATIENT HEALTH QUESTIONNAIRE - PHQ9: SUM OF ALL RESPONSES TO PHQ QUESTIONS 1-9: 8

## 2018-07-08 LAB — HIV 1+2 AB+HIV1 P24 AG SERPL QL IA: NONREACTIVE

## 2018-07-09 LAB — TESTOST SERPL-MCNC: 313 NG/DL (ref 8–60)

## 2018-08-31 ENCOUNTER — TELEPHONE (OUTPATIENT)
Dept: FAMILY MEDICINE | Facility: CLINIC | Age: 38
End: 2018-08-31

## 2018-08-31 DIAGNOSIS — F64.0 GENDER DYSPHORIA IN ADULT: Primary | ICD-10-CM

## 2018-08-31 NOTE — TELEPHONE ENCOUNTER
[facility] phone call message- general phone call:    Reason for call: Letter of support    Action Desired: Patient is calling because they need a letter of support written and sent to their insurance company for surgery. Patient will call insurance company and call us back to let us know where to fax that letter.     Return call needed: Yes, when letter is completed    OK to leave a message on voice mail? Yes    Advised patient response may take up to 2 business days: Yes    Primary language: English      needed? No    Call taken on August 31, 2018 at 8:07 AM by Rebekah Whitten    [facility]

## 2018-08-31 NOTE — TELEPHONE ENCOUNTER
The pt called back with fax # for [employer] to fax completed letter of support for surgery [phone]

## 2018-08-31 NOTE — TELEPHONE ENCOUNTER
8/31/2018  9:23 AM    Called patient to confirm what is needed. Pt is planning on having mastectomy and needs letters from Mental health provider. Pt does have therapist who he sees regularly and is comfortable getting letter from them. Recommended pt be connected into [facility] for second letter (as we believe this is needed for insurance).     Emerson Machado DO, MA  Family Medicine PGY-2  [facility], [facility]   Pager: [phone]

## 2018-10-25 DIAGNOSIS — E78.2 MIXED HYPERLIPIDEMIA: ICD-10-CM

## 2018-10-25 RX ORDER — ATORVASTATIN CALCIUM 20 MG/1
20 TABLET, FILM COATED ORAL DAILY
Qty: 60 TABLET | Refills: 2 | Status: SHIPPED | OUTPATIENT
Start: 2018-10-25 | End: 2019-01-22

## 2018-10-25 NOTE — TELEPHONE ENCOUNTER

## 2018-12-07 DIAGNOSIS — F64.0 GENDER DYSPHORIA IN ADULT: ICD-10-CM

## 2018-12-07 RX ORDER — TESTOSTERONE CYPIONATE 200 MG/ML
60 INJECTION, SOLUTION INTRAMUSCULAR WEEKLY
Qty: 3 ML | Refills: 0 | Status: SHIPPED | OUTPATIENT
Start: 2018-12-07 | End: 2019-01-22

## 2018-12-07 NOTE — TELEPHONE ENCOUNTER
Med Refilled. Pt due back in January to see me. Please print and have preceptor sign then fax to pharmacy.    Please call pt to schedule with me in early January for HRT and labs.      -MARINA

## 2018-12-07 NOTE — TELEPHONE ENCOUNTER

## 2018-12-07 NOTE — TELEPHONE ENCOUNTER
RN printed and had preceptor Dr. Taylor sign it, faxed to pharmacy. Left voicemail for patient that a prescription was sent and that they need a visit and labs in January.  Courtney Renee RN

## 2019-01-22 ENCOUNTER — OFFICE VISIT (OUTPATIENT)
Dept: FAMILY MEDICINE | Facility: CLINIC | Age: 39
End: 2019-01-22
Payer: COMMERCIAL

## 2019-01-22 VITALS
DIASTOLIC BLOOD PRESSURE: 83 MMHG | SYSTOLIC BLOOD PRESSURE: 117 MMHG | OXYGEN SATURATION: 98 % | BODY MASS INDEX: 40.57 KG/M2 | WEIGHT: 243.8 LBS | TEMPERATURE: 98.1 F | HEART RATE: 69 BPM

## 2019-01-22 DIAGNOSIS — E78.2 MIXED HYPERLIPIDEMIA: ICD-10-CM

## 2019-01-22 DIAGNOSIS — F64.0 GENDER DYSPHORIA IN ADULT: Primary | ICD-10-CM

## 2019-01-22 DIAGNOSIS — R53.83 LOW ENERGY: ICD-10-CM

## 2019-01-22 LAB
ALBUMIN SERPL-MCNC: 4.6 MG/DL (ref 3.8–5)
ALP SERPL-CCNC: 115.7 U/L (ref 31.7–110.5)
ALT SERPL-CCNC: 45.5 U/L (ref 0–45)
AST SERPL-CCNC: 27.8 U/L (ref 0–45)
BILIRUB SERPL-MCNC: <0.4 MG/DL (ref 0.2–1.3)
BILIRUBIN DIRECT: 0.1 MG/DL (ref 0.1–0.3)
CHOLEST SERPL-MCNC: 124.5 MG/DL (ref 0–200)
CHOLEST/HDLC SERPL: 4 {RATIO} (ref 0–5)
HCT VFR BLD AUTO: 51.5 % (ref 35–47)
HDLC SERPL-MCNC: 30.8 MG/DL
HEMOGLOBIN: 15.7 G/DL (ref 11.7–15.7)
LDLC SERPL CALC-MCNC: 60 MG/DL (ref 0–129)
MCH RBC QN AUTO: 29.7 PG (ref 26.5–35)
MCHC RBC AUTO-ENTMCNC: 30.5 G/DL (ref 32–36)
MCV RBC AUTO: 97.4 FL (ref 78–100)
PLATELET # BLD AUTO: 357 K/UL (ref 150–450)
PROT SERPL-MCNC: 7.7 G/DL (ref 6.8–8.8)
RBC # BLD AUTO: 5.29 M/UL (ref 3.8–5.2)
TRIGL SERPL-MCNC: 168.6 MG/DL (ref 0–150)
VLDL CHOLESTEROL: 33.7 MG/DL (ref 7–32)
WBC # BLD AUTO: 8.3 K/UL (ref 4–11)

## 2019-01-22 RX ORDER — TESTOSTERONE CYPIONATE 200 MG/ML
60 INJECTION, SOLUTION INTRAMUSCULAR WEEKLY
Qty: 1 ML | Refills: 8 | Status: SHIPPED | OUTPATIENT
Start: 2019-01-22 | End: 2019-06-17

## 2019-01-22 RX ORDER — ATORVASTATIN CALCIUM 20 MG/1
20 TABLET, FILM COATED ORAL DAILY
Qty: 60 TABLET | Refills: 5 | Status: SHIPPED | OUTPATIENT
Start: 2019-01-22 | End: 2019-05-20

## 2019-01-22 NOTE — PROGRESS NOTES
"          SHAMEKA Lee is a 38 year old individual that uses pronouns He/Him/His/Himself that presents today for follow up of:  masculinizing hormone therapy. Gender identity: male     Any special concerns today?  Refills. Low energy, check vitamin D.    On hormones?  YES +++ Shot day of the week? Saturday   (but missed this last dose)   Due for labs?  Yes      +++ Refills of meds needed?  Yes  ---    Past Surgical History:   Procedure Laterality Date     BREAST SURGERY       ENT SURGERY  10/24/2017    Plastic Nose Repair       Patient Active Problem List   Diagnosis     Bipolar 1 disorder (H)     Anxiety     Gender dysphoria in adult     PTSD (post-traumatic stress disorder)     H/O: attempted suicide     Methamphetamine abuse in remission (H)     Chondromalacia patellae of right knee       Current Outpatient Medications   Medication Sig Dispense Refill     Acetaminophen (TYLENOL PO) Take 325 mg by mouth every 4 hours as needed        ARIPiprazole (ABILIFY) 9.75 MG/1.3ML injection Inject 10 mg into the muscle once       atorvastatin (LIPITOR) 20 MG tablet Take 1 tablet (20 mg) by mouth daily 60 tablet 5     CITALOPRAM HYDROBROMIDE PO Take 20 mg by mouth daily       gabapentin (NEURONTIN) 100 MG capsule Take 1 capsule (100 mg) by mouth 2 times daily 100-300mg at night 1 capsule 0     lurasidone (LATUDA) 80 MG TABS tablet Take 1 tablet (80 mg) by mouth daily 1 tablet 0     needle, disp, 18G X 1\" MISC Use to draw up hormones once weekly 25 each 3     Needle, Disp, 25G X 1-1/2\" MISC Use once weekly for administering hormone IM 25 each 3     syringe, disposable, 1 ML MISC Use once weekly to draw up hormones 25 each 3     testosterone cypionate (DEPOTESTOSTERONE) 200 MG/ML injection Inject 0.3 mLs (60 mg) into the muscle once a week 1 mL 8       History   Smoking Status     Current Every Day Smoker     Packs/day: 0.50     Years: 21.00   Smokeless Tobacco     Never Used     Comment: using nicotine inhaler which helps " "    Allergies   Allergen Reactions     Nickel Itching     Seroquel [Quetiapine] Other (See Comments)     Restless leg syndrom     Problem, Medication and Allergy Lists were reviewed and updated if needed..         Review of Systems:      General    Fat redistribution: yes, desired    Weight change: YES- up, needs exercise, discussed. HEENT    Voice change: YES     Cardiovascular (CV)    Chest Pains: no    Shortness of breath: no Chest    Decreased exercise tolerance:  Some fatigue    Breast changes/development: not applicable     Gastrointestinal (GI)    Abdominal pain: no    Change in appetite: no Skin    Acne or oily skin: face and arms clearing. Back acne    Change in hair: YES- masc hair pattern starting.      Genitourinary ()    Abnormal vaginal bleeding: no     Decreased spontaneous erections: not applicable    Change in libido: no    New sexual partners: no Musculoskeletal    Leg pain or swelling: no     Psychiatric (Psych)    Depression: seeing counselors, has SAD.    Anxiety/Panic: little anxiety and counseling ongoing    Mood:  \"okay\"                    Physical Exam:     Vitals:    01/22/19 1440   BP: 117/83   Pulse: 69   Temp: 98.1  F (36.7  C)   TempSrc: Oral   SpO2: 98%   Weight: 110.6 kg (243 lb 12.8 oz)     BMI= Body mass index is 40.57 kg/m .   Wt Readings from Last 10 Encounters:   01/22/19 110.6 kg (243 lb 12.8 oz)   07/06/18 109.6 kg (241 lb 9.6 oz)   02/08/18 108.9 kg (240 lb)   11/10/17 100.7 kg (222 lb)   10/11/17 100.5 kg (221 lb 9.6 oz)   09/07/17 96.4 kg (212 lb 9.6 oz)   08/11/17 94.4 kg (208 lb 3.2 oz)   07/21/17 93.2 kg (205 lb 6.4 oz)   06/23/17 89.4 kg (197 lb)     Appearance: Male appearance and dress    GENERAL:: healthy, alert and no distress  EYES: Eyes grossly normal to inspection, fundi benign and PERRL  HENT: ear canals normal, TM's normal, Nose normal, Mouth- no ulcers, no lesions  NECK: no adenopathy, no asymmetry, no masses,  and thyroid normal to palpation, supple  CV: " "regular rates and rhythm, normal S1 S2, no S3 or S4 and no murmur, no click or rub -  ABDOMEN: soft, no tenderness, no  hepatosplenomegaly, no masses, normal bowel sounds  MS: extremities normal- no gross deformities noted, no edema  SKIN: no suspicious lesions, no rashes  NEURO: Normal strength and tone, sensory exam grossly normal, mentation intact and speech normal, reflexes symmetric  Psych: Alert and oriented times 3; coherent speech, normal rate and volume, able to articulate logical thoughts, able to abstract reason, no tangential thoughts, no hallucinations or delusions. Affect: Appropriate/mood-congruent           Labs:   Labs ordered and pending.     Assessment and Plan     There are no diagnoses linked to this encounter.    Jesus was seen today for recheck.    Diagnoses and all orders for this visit:    Gender dysphoria in adult  -     Needle, Disp, 25G X 1-1/2\" MISC; Use once weekly for administering hormone IM  -     needle, disp, 18G X 1\" MISC; Use to draw up hormones once weekly  -     Lipid [place] ([facility])  -     Testosterone total  -     CBC with Plt ([facility])  -     Hepatic Panel ([facility])  -     testosterone cypionate (DEPOTESTOSTERONE) 200 MG/ML injection; Inject 0.3 mLs (60 mg) into the muscle once a week    Mixed hyperlipidemia  Refilled med. Will check Lipids today. High last time 2/2 testosterone and lifestyle.   -     atorvastatin (LIPITOR) 20 MG tablet; Take 1 tablet (20 mg) by mouth daily      Contraception:   abstinence and not needed     Counselled patient about controlled substances: Yes. Details: paper, script, 6 mo supply.     Follow up:  Follow up in 6 months.  Results by mycperezt  Questions were elicited and answered.     Low energy  -     Vitamin D Level    Emerson Machado DO, MA  Family Medicine PGY-2  [facility] Medicine   Pager: [phone]    "

## 2019-01-22 NOTE — PROGRESS NOTES
Preceptor Attestation:   Patient seen, evaluated and discussed with the resident. I have verified the content of the note, which accurately reflects my assessment of the patient and the plan of care.   Supervising Physician:  Tiffany Cabrera MD

## 2019-01-23 ENCOUNTER — PATIENT OUTREACH (OUTPATIENT)
Dept: PLASTIC SURGERY | Facility: CLINIC | Age: 39
End: 2019-01-23

## 2019-01-23 DIAGNOSIS — F64.0 GENDER DYSPHORIA IN ADULT: Primary | ICD-10-CM

## 2019-01-23 LAB — DEPRECATED CALCIDIOL+CALCIFEROL SERPL-MC: 17 UG/L (ref 20–75)

## 2019-01-23 NOTE — PROGRESS NOTES
[facility]:  Care Coordination Note     SITUATION   Patient is a 38 year old who is receiving support for:  Clinic Care Coordination - Initial  .    BACKGROUND     New Patient referred by [facility] - Dr. Machado for top surgery with Dr. Bear.     ASSESSMENT     Surgery              CGC Assessment  Comprehensive Gender Care (CGC) Enrollment: Enrolled(Hormones since 10/2017 @ [facility]. )  Patient has a therapist: Yes(But may seek out a gender specialist to write letter. )  Name of therapist:  Elle Colón clinical psychologist   Letter of support #1: Requested  Surgery being considered: Yes  Mastectomy: Yes          PLAN          Nursing Interventions:   CGC program and services reviewed; transition assessment completed. Surgical process reviewed including Letters of support. Information sent via [facility] and Referrals for gender specialists.     Follow-up plan:  Pt to obtain a letter of support; attend consultation appt with Dr. Bear.        Devon Vasquez

## 2019-01-24 DIAGNOSIS — E55.9 VITAMIN D DEFICIENCY: Primary | ICD-10-CM

## 2019-01-24 LAB — TESTOST SERPL-MCNC: 287 NG/DL (ref 8–60)

## 2019-01-24 RX ORDER — ERGOCALCIFEROL 1.25 MG/1
50000 CAPSULE, LIQUID FILLED ORAL WEEKLY
Qty: 8 CAPSULE | Refills: 0 | Status: SHIPPED | OUTPATIENT
Start: 2019-01-24 | End: 2019-02-21

## 2019-02-19 DIAGNOSIS — F64.0 GENDER DYSPHORIA IN ADULT: ICD-10-CM

## 2019-02-19 NOTE — TELEPHONE ENCOUNTER

## 2019-02-21 DIAGNOSIS — E55.9 VITAMIN D DEFICIENCY: ICD-10-CM

## 2019-02-21 RX ORDER — ERGOCALCIFEROL (VITAMIN D2) 50 MCG
2000 CAPSULE ORAL DAILY
Qty: 100 CAPSULE | Refills: 3 | Status: SHIPPED | OUTPATIENT
Start: 2019-02-21 | End: 2019-03-04 | Stop reason: ALTCHOICE

## 2019-02-21 NOTE — TELEPHONE ENCOUNTER

## 2019-02-21 NOTE — TELEPHONE ENCOUNTER
Please call patient and inform him that now that he's completed the higher weekly dose of vitamin D, he should transition to a daily maintenance dose of vitamin D (2000 units daily. Same as over the counter vitamin D) . This has been ordered.   Thanks!  MARINA

## 2019-03-04 DIAGNOSIS — E55.9 VITAMIN D DEFICIENCY: Primary | ICD-10-CM

## 2019-03-04 RX ORDER — CHOLECALCIFEROL (VITAMIN D3) 50 MCG
1 TABLET ORAL DAILY
Qty: 100 TABLET | Refills: 3 | Status: SHIPPED | OUTPATIENT
Start: 2019-03-04 | End: 2020-06-22

## 2019-05-20 DIAGNOSIS — E78.2 MIXED HYPERLIPIDEMIA: ICD-10-CM

## 2019-05-20 RX ORDER — ATORVASTATIN CALCIUM 20 MG/1
20 TABLET, FILM COATED ORAL DAILY
Qty: 90 TABLET | Refills: 3 | Status: SHIPPED | OUTPATIENT
Start: 2019-05-20 | End: 2020-06-24

## 2019-06-17 DIAGNOSIS — F64.0 GENDER DYSPHORIA IN ADULT: ICD-10-CM

## 2019-06-18 RX ORDER — TESTOSTERONE CYPIONATE 200 MG/ML
60 INJECTION, SOLUTION INTRAMUSCULAR WEEKLY
Qty: 1 ML | Refills: 0 | Status: SHIPPED | OUTPATIENT
Start: 2019-06-18 | End: 2019-07-02

## 2019-06-18 NOTE — TELEPHONE ENCOUNTER
Pt due for visit in 3 weeks for follow up and labs. Refill granted for 3 week supply. Please call pt to schedule with me. Please also print pended script for preceptor to sign, then fax to requested pharmacy.  Emerson Machado, DO

## 2019-06-18 NOTE — TELEPHONE ENCOUNTER
RN printed, had preceptor sign and faxed to pharmacy. Left generic message that a prescription was sent to their pharmacy and they will need an appointment before it runs out (did specify that this will need to be in 3 weeks).  Courtney Renee RN

## 2019-07-02 ENCOUNTER — OFFICE VISIT (OUTPATIENT)
Dept: FAMILY MEDICINE | Facility: CLINIC | Age: 39
End: 2019-07-02
Payer: COMMERCIAL

## 2019-07-02 VITALS
HEART RATE: 92 BPM | DIASTOLIC BLOOD PRESSURE: 74 MMHG | RESPIRATION RATE: 16 BRPM | SYSTOLIC BLOOD PRESSURE: 108 MMHG | TEMPERATURE: 98 F | WEIGHT: 237.8 LBS | BODY MASS INDEX: 38.22 KG/M2 | HEIGHT: 66 IN | OXYGEN SATURATION: 96 %

## 2019-07-02 DIAGNOSIS — F64.0 GENDER DYSPHORIA IN ADULT: Primary | ICD-10-CM

## 2019-07-02 LAB
% GRANULOCYTES: 60.2 %G (ref 40–75)
ALBUMIN SERPL-MCNC: 4.6 MG/DL (ref 3.8–5)
ALP SERPL-CCNC: 108.9 U/L (ref 31.7–110.5)
ALT SERPL-CCNC: 41.9 U/L (ref 0–45)
AST SERPL-CCNC: 34.2 U/L (ref 0–45)
BILIRUB SERPL-MCNC: <0.4 MG/DL (ref 0.2–1.3)
BUN SERPL-MCNC: 8.9 MG/DL (ref 7–19)
CALCIUM SERPL-MCNC: 9.2 MG/DL (ref 8.5–10.1)
CHLORIDE SERPLBLD-SCNC: 102.3 MMOL/L (ref 98–110)
CHOLEST SERPL-MCNC: 129.8 MG/DL (ref 0–200)
CHOLEST/HDLC SERPL: 4.3 {RATIO} (ref 0–5)
CO2 SERPL-SCNC: 24.6 MMOL/L (ref 20–32)
CREAT SERPL-MCNC: 1 MG/DL (ref 0.5–1)
GFR SERPL CREATININE-BSD FRML MDRD: 66.7 ML/MIN/1.7 M2
GLUCOSE SERPL-MCNC: 105.5 MG'DL (ref 70–99)
GRANULOCYTES #: 6.5 K/UL (ref 1.6–8.3)
HCT VFR BLD AUTO: 51.1 % (ref 35–47)
HDLC SERPL-MCNC: 29.9 MG/DL
HEMOGLOBIN: 15.1 G/DL (ref 11.7–15.7)
LDLC SERPL CALC-MCNC: 64 MG/DL (ref 0–129)
LYMPHOCYTES # BLD AUTO: 3 K/UL (ref 0.8–5.3)
LYMPHOCYTES NFR BLD AUTO: 27.4 %L (ref 20–48)
MCH RBC QN AUTO: 29.3 PG (ref 26.5–35)
MCHC RBC AUTO-ENTMCNC: 29.5 G/DL (ref 32–36)
MCV RBC AUTO: 99 FL (ref 78–100)
MID #: 1.3 K/UL (ref 0–2.2)
MID %: 12.4 %M (ref 0–20)
PLATELET # BLD AUTO: 324 K/UL (ref 150–450)
POTASSIUM SERPL-SCNC: 3.8 MMOL/DL (ref 3.3–4.5)
PROT SERPL-MCNC: 7.8 G/DL (ref 6.8–8.8)
RBC # BLD AUTO: 5.16 M/UL (ref 3.8–5.2)
SODIUM SERPL-SCNC: 138.3 MMOL/L (ref 132.6–141.4)
TRIGL SERPL-MCNC: 180 MG/DL (ref 0–150)
VLDL CHOLESTEROL: 36 MG/DL (ref 7–32)
WBC # BLD AUTO: 10.8 K/UL (ref 4–11)

## 2019-07-02 RX ORDER — TESTOSTERONE CYPIONATE 200 MG/ML
60 INJECTION, SOLUTION INTRAMUSCULAR WEEKLY
Qty: 4.5 ML | Refills: 1 | Status: SHIPPED | OUTPATIENT
Start: 2019-07-02 | End: 2019-12-06

## 2019-07-02 RX ORDER — BUPROPION HYDROCHLORIDE 300 MG/1
300 TABLET ORAL DAILY
Refills: 1 | COMMUNITY
Start: 2019-06-18 | End: 2019-07-02

## 2019-07-02 RX ORDER — ESCITALOPRAM OXALATE 10 MG/1
TABLET ORAL
Refills: 0 | COMMUNITY
Start: 2019-02-28 | End: 2019-07-02

## 2019-07-02 RX ORDER — DOCUSATE SODIUM 100 MG/1
CAPSULE, LIQUID FILLED ORAL
Refills: 0 | COMMUNITY
Start: 2019-06-28 | End: 2020-06-22

## 2019-07-02 RX ORDER — ARIPIPRAZOLE 20 MG/1
TABLET ORAL
Refills: 0 | COMMUNITY
Start: 2019-06-18 | End: 2019-07-02

## 2019-07-02 RX ORDER — ESCITALOPRAM OXALATE 5 MG/1
TABLET ORAL
Refills: 1 | COMMUNITY
Start: 2019-06-18 | End: 2023-08-01

## 2019-07-02 RX ORDER — CITALOPRAM HYDROBROMIDE 10 MG/1
5 TABLET ORAL DAILY
COMMUNITY
End: 2019-12-06

## 2019-07-02 ASSESSMENT — MIFFLIN-ST. JEOR: SCORE: 1777.46

## 2019-07-02 NOTE — PROGRESS NOTES
Preceptor Attestation:   Patient seen, evaluated and discussed with the resident. I have verified the content of the note, which accurately reflects my assessment of the patient and the plan of care.   Supervising Physician:  Brissa Galeana MD

## 2019-07-03 NOTE — PATIENT INSTRUCTIONS
Follow-up with me on my chart in 6 to 8 weeks to check in about exercise regimen and weight loss/plan to prepare for Surgery

## 2019-07-05 LAB — TESTOST SERPL-MCNC: 719 NG/DL (ref 8–60)

## 2019-08-08 ENCOUNTER — OFFICE VISIT (OUTPATIENT)
Dept: FAMILY MEDICINE | Facility: CLINIC | Age: 39
End: 2019-08-08
Payer: COMMERCIAL

## 2019-08-08 VITALS
SYSTOLIC BLOOD PRESSURE: 108 MMHG | DIASTOLIC BLOOD PRESSURE: 72 MMHG | OXYGEN SATURATION: 97 % | HEART RATE: 64 BPM | HEIGHT: 65 IN | TEMPERATURE: 98.1 F | RESPIRATION RATE: 16 BRPM | WEIGHT: 234.8 LBS | BODY MASS INDEX: 39.12 KG/M2

## 2019-08-08 DIAGNOSIS — M77.11 LATERAL EPICONDYLITIS OF RIGHT ELBOW: Primary | ICD-10-CM

## 2019-08-08 RX ORDER — BUPROPION HYDROCHLORIDE 300 MG/1
300 TABLET ORAL DAILY
Refills: 1 | COMMUNITY
Start: 2019-07-18 | End: 2023-08-01

## 2019-08-08 RX ORDER — GABAPENTIN 300 MG/1
CAPSULE ORAL
Refills: 1 | COMMUNITY
Start: 2019-07-15 | End: 2020-10-01

## 2019-08-08 RX ORDER — ARIPIPRAZOLE 20 MG/1
TABLET ORAL
Refills: 1 | COMMUNITY
Start: 2019-07-15 | End: 2020-09-28

## 2019-08-08 ASSESSMENT — ENCOUNTER SYMPTOMS
CHILLS: 0
WOUND: 0
ARTHRALGIAS: 1
FEVER: 0

## 2019-08-08 ASSESSMENT — MIFFLIN-ST. JEOR: SCORE: 1755.93

## 2019-08-08 NOTE — PROGRESS NOTES
"       HPI       Jesus Hurd is a 38 year old  who presents for   Chief Complaint   Patient presents with     Elbow Pain     pain in right elbow for 2 months would like a cortisone shot       Joint/Muscle Pain      Onset: 2 months    Injury?  Yes Details: slow injury over some time, no specific fall or pop.    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    Patient is an established patient of this clinic..         Review of Systems:   Review of Systems   Constitutional: Negative for chills and fever.   Musculoskeletal: Positive for arthralgias.   Skin: Negative for rash and wound.            Physical Exam:     Vitals:    08/08/19 1545   BP: 108/72   Pulse: 64   Resp: 16   Temp: 98.1  F (36.7  C)   TempSrc: Oral   SpO2: 97%   Weight: 106.5 kg (234 lb 12.8 oz)   Height: 1.651 m (5' 5\")     Body mass index is 39.07 kg/m .  Vitals were reviewed and were normal     Physical Exam   Constitutional: He is oriented to person, place, and time. He appears well-developed and well-nourished.   HENT:   Head: Normocephalic and atraumatic.   Eyes: EOM are normal. No scleral icterus.   Cardiovascular: Normal rate and normal heart sounds.   Pulmonary/Chest: Effort normal. No respiratory distress.   Neurological: He is alert and oriented to person, place, and time.   Psychiatric: He has a normal mood and affect. His behavior is normal.      Right " Elbow    Inspection: no swelling, no ecchymosis  Tender: lateral epicondyle  Non-tender: medial epicondyle and olecranon bursa  Range of Motion: all normal  Strength: reduced with wrist extension   Special tests: normal stability, normal valgus stress, abnormal valgus stress, ulnar Tinel's positive:     Left elbow     Normal, no ROM reduction, non tender.       Results:   No testing ordered today    Assessment and Plan        Jesus was seen today for elbow pain.    Diagnoses and all orders for this visit:    Lateral epicondylitis of right elbow  -     ANKIT, PT, HAND AND CHIROPRACTIC REFERRAL - [facility]; Future    Patient with lateral epicondylitis of right elbow.  Low suspicion for fracture or dislocation.  Non-bursitis on exam.  Tender over lateral epicondyle but not medial.  Has been using brace, and intermittent ibuprofen, better with rest.  At this time will recommend hand therapy for rehabilitation.  Recommended rest and ice as needed.  Will follow-up with me in 4 to 6 weeks after a few sessions of therapy.       Medications Discontinued During This Encounter   Medication Reason     CITALOPRAM HYDROBROMIDE PO      gabapentin (NEURONTIN) 100 MG capsule        Options for treatment and follow-up care were reviewed with the patient. Jesus Hurd  engaged in the decision making process and verbalized understanding of the options discussed and agreed with the final plan.    Emerson Machado DO, MA  Family Medicine PGY-3  [facility] Family Medicine   Pager: [phone]

## 2019-08-11 NOTE — PROGRESS NOTES
Preceptor Attestation:   Patient seen, evaluated and discussed with the resident. I have verified the content of the note, which accurately reflects my assessment of the patient and the plan of care.   Supervising Physician:  Tory Edouard MD

## 2019-08-20 ENCOUNTER — THERAPY VISIT (OUTPATIENT)
Dept: OCCUPATIONAL THERAPY | Facility: CLINIC | Age: 39
End: 2019-08-20
Attending: FAMILY MEDICINE
Payer: COMMERCIAL

## 2019-08-20 DIAGNOSIS — M77.11 LATERAL EPICONDYLITIS OF RIGHT ELBOW: ICD-10-CM

## 2019-08-20 PROCEDURE — 97140 MANUAL THERAPY 1/> REGIONS: CPT | Mod: GO | Performed by: OCCUPATIONAL THERAPIST

## 2019-08-20 PROCEDURE — 97112 NEUROMUSCULAR REEDUCATION: CPT | Mod: GO | Performed by: OCCUPATIONAL THERAPIST

## 2019-08-20 PROCEDURE — 97165 OT EVAL LOW COMPLEX 30 MIN: CPT | Mod: GO | Performed by: OCCUPATIONAL THERAPIST

## 2019-08-20 NOTE — PROGRESS NOTES
Hand Therapy Initial Evaluation    Current Date:  8/20/2019    Diagnosis: R lateral elbow pain  DOI: June 2019   Procedure:  none    Precautions: NA    Subjective:  Jesus Hurd is a 38 year old adult.    Patient reports symptoms of the right elbow which occurred due to not sure, not may be from moving kegs at work. Since onset symptoms are Unchanged  Special tests:  none.  Previous treatment: wrist cock-up and elbow strap.    General health as reported by patient is good.  Pertinent medical history includes:Depression, Mental Illness, Smoking  Medical allergies:none.  Surgical history: other:  See medical chart.  Medication history: see medical chart.    Current occupation is [employer]  Currently working in normal job with restrictions  Job Tasks: Lifting, Carrying, Prolonged Standing, Pushing, Pulling, Repetitive Tasks    Occupational Profile Information:  Right hand dominant  Prior functional level:  no limitations  Patient reports symptoms of pain, stiffness/loss of motion, weakness/loss of strength, numbness and tingling   Barriers include:none  Mobility: No difficulty  Transportation: drives  Leisure activities/hobbies: NA    Functional Outcome Measure:   Upper Extremity Functional Index Score:  SCORE:   Column Totals: /80: 28   (A lower score indicates greater disability.)    Objective:  Pain Level (Scale 0-10)   8/20/2019   At Rest 4/10   At Worst: 9/10     Pain Description  Date 8/20/2019   Location R lateral elbow   Pain Quality Aching, Burning, Dull, Sharp and Shooting   Frequency intermittent     Pain is worst  daytime   Exacerbated by  lifting, putting hands behind head, wrist movement   Relieved by rest   Progression Staying the same     Sensation:  Decreased Radial Nerve distribution per pt report    ROM  Pain Report: - none  + mild    ++ moderate    +++ severe   Elbow 8/20/2019 8/20/2019   AROM (PROM) left right   Extension 0 -16+   Flexion 145 133   Supination 75 60   Pronation 87 87      Wrist 8/20/2019 8/20/2019   AROM (PROM) L R   Extension 70 60++   Flexion 62 58   RD 32 28+   UD 30 25     Resisted Testing     Pain Report:  - none    + mild    ++ moderate    +++ severe    8/20/2019   Elbow Extension 8/10   Elbow Flexion 8/10   Supination 9/10   Pronation -   Wrist Flex -   Wrist Ext 10/10   Wrist Ext   elbow extended 8/10, elbow extension only   Long finger test 8/10, elbow extension only   Resisted  Sup w/ elbow ext 7/10, elbow extension only     Strength:   (Measured in pounds)  Pain Report: - none  + mild    ++ moderate    +++ severe    8/20/2019 8/20/2019   Trials left right   1  2  3 74  78  78 11+  16+  9+   Average: 77 12       Elbow Ext  8/20/2019   Trials left right    NT NT     Palpation  Pain Report:  - none    + mild    ++ moderate    +++ severe    8/20/2019   Lateral Epicondyle +   PIN +   Radial Head +     Assessment:  Patient presents with symptoms consistent with diagnosis of Lateral Epicondylitis,  with conservative intervention.   Patient's limitations or Problem List includes:  Pain, Decreased ROM/motion, Weakness, Decreased  and Tightness in musculature of the right elbow which interferes with the patient's ability to perform Self Care Tasks (dressing, eating, bathing, hygiene/toileting), Work Tasks, Sleep Patterns, Recreational Activities, Household Chores and Driving  as compared to previous level of function.    Rehab Potential:  Excellent - Return to full activity, no limitations    Patient will benefit from skilled Occupational Therapy to increase ROM, flexibility,  strength and forearm strength and decrease pain to return to previous activity level and resume normal daily tasks and to reach their rehab potential.    Barriers to Learning:  No barrier    Communication Issues:  Patient appears to be able to clearly communicate and understand verbal and written communication and follow directions correctly.    Chart Review: Simple history review with  patient    Identified Performance Deficits: bathing/showering, toileting, dressing, feeding, hygiene and grooming, driving and community mobility, health management and maintenance, home establishment and management, meal preparation and cleanup, work and leisure activities    Assessment of Occupational Performance:  5 or more Performance Deficits    Clinical Decision Making (Complexity): Low complexity    Treatment Explanation:  The following has been discussed with the patient:    RX ordered/plan of care  Anticipated outcomes  Possible risks and side effects    Plan:  Frequency:  1 X week, once daily  Duration:  for 6 weeks    Treatment Plan:    Modalities:  US, Fluidotherapy and Paraffin   Therapeutic Exercise: AROM of elbow and wrist, PROM with stretch to wrist extensors and flexors,  ECRL strengthening progressing to ECRB strengthening (eccentric progressing to concentric ).   Manual Techniques: Radial head mobilization, friction massage, myofascial release  Neuromuscular:  Nerve gliding, K taping  Orthoses:  Wrist cock up orthosis, arm band     Discharge Plan:    Achieve all LTG.  Independent in home treatment program.  Reach maximal therapeutic benefit.    Home Program:   Warmth for stiffness  Ice after activity for pain  TFM to LEP  MFR to extensors  ECRL AROM  Wrist cock up orthosis sleeping, PRN during day  Elbow strap/arm band as needed with activities  Avoid activities that exacerbate pain in the elbow  Lift with forearms in neutral position    Next Visit:  Friction massage  Massage to extensors  ECRL  Eccentric strengthening, if pain has decreased  Check effectiveness of OTC wrist cock-up orthosis, may need fabricated wrist cock-up?

## 2019-09-03 ENCOUNTER — THERAPY VISIT (OUTPATIENT)
Dept: OCCUPATIONAL THERAPY | Facility: CLINIC | Age: 39
End: 2019-09-03
Payer: COMMERCIAL

## 2019-09-03 DIAGNOSIS — M77.11 LATERAL EPICONDYLITIS OF RIGHT ELBOW: ICD-10-CM

## 2019-09-03 PROCEDURE — 97112 NEUROMUSCULAR REEDUCATION: CPT | Mod: GO | Performed by: OCCUPATIONAL THERAPIST

## 2019-09-03 PROCEDURE — 97110 THERAPEUTIC EXERCISES: CPT | Mod: GO | Performed by: OCCUPATIONAL THERAPIST

## 2019-09-03 PROCEDURE — 97140 MANUAL THERAPY 1/> REGIONS: CPT | Mod: GO | Performed by: OCCUPATIONAL THERAPIST

## 2019-10-15 ENCOUNTER — PRE VISIT (OUTPATIENT)
Dept: SURGERY | Facility: CLINIC | Age: 39
End: 2019-10-15

## 2019-10-15 NOTE — TELEPHONE ENCOUNTER
FUTURE VISIT INFORMATION      FUTURE VISIT INFORMATION:    Date: 11/5/19    Time: 9:00am    Location: [facility]  REFERRAL INFORMATION:    Referring provider:  Dr. Machado    Referring providers clinic:  [facility]    Reason for visit/diagnosis  Top consult    RECORDS REQUESTED FROM:       No recs to collect

## 2019-10-16 ENCOUNTER — MEDICAL CORRESPONDENCE (OUTPATIENT)
Dept: HEALTH INFORMATION MANAGEMENT | Facility: CLINIC | Age: 39
End: 2019-10-16

## 2019-10-28 PROBLEM — M77.11 LATERAL EPICONDYLITIS OF RIGHT ELBOW: Status: RESOLVED | Noted: 2019-08-20 | Resolved: 2019-10-28

## 2019-10-28 NOTE — PROGRESS NOTES
Discharge Summary - Hand Therapy    Patient did not return to therapy.  Assume all goals were met to patient's satisfaction. D/C from hand therapy.

## 2019-11-05 ENCOUNTER — OFFICE VISIT (OUTPATIENT)
Dept: PLASTIC SURGERY | Facility: CLINIC | Age: 39
End: 2019-11-05
Payer: COMMERCIAL

## 2019-11-05 ENCOUNTER — PATIENT OUTREACH (OUTPATIENT)
Dept: PLASTIC SURGERY | Facility: CLINIC | Age: 39
End: 2019-11-05

## 2019-11-05 ENCOUNTER — MYC MEDICAL ADVICE (OUTPATIENT)
Dept: FAMILY MEDICINE | Facility: CLINIC | Age: 39
End: 2019-11-05

## 2019-11-05 VITALS — BODY MASS INDEX: 37.49 KG/M2 | WEIGHT: 225 LBS | HEIGHT: 65 IN

## 2019-11-05 DIAGNOSIS — F64.0 GENDER DYSPHORIA IN ADULT: Primary | ICD-10-CM

## 2019-11-05 DIAGNOSIS — Z01.818 PREOPERATIVE EXAMINATION: Primary | ICD-10-CM

## 2019-11-05 DIAGNOSIS — F64.0 GENDER DYSPHORIA IN ADULT: ICD-10-CM

## 2019-11-05 ASSESSMENT — PAIN SCALES - GENERAL: PAINLEVEL: NO PAIN (0)

## 2019-11-05 ASSESSMENT — MIFFLIN-ST. JEOR: SCORE: 1706.47

## 2019-11-05 NOTE — LETTER
11/5/2019       RE: Jesus Hurd  [address] [place] [address]     Dear Colleague,    Thank you for referring your patient, Jesus Hurd, to the [facility] PLASTIC AND RECONSTRUCTIVE SURGERY at [facility]. Please see a copy of my visit note below.    PLASTICS NEW TOP   HPI: This is a 39 year old biological female transitioning to male with a history of gender dysphoria, anxiety, depression, bipolar disorder and PTSD who presents today with his wife, Elva, for a consultation for top surgery. He prefers pronouns he/him and his preferred name is Jesus. He was referred by his PCP, Dr. Emerson aMchado at [facility], and made his appointment 11 months ago. He had an appointment in June but was forced to be rescheduled to November. His therapist is Caryn Colón at [facility], and Jesus has been seeing her since 2017. Caryn has written a letter of support for Jesus, but some modifications need to be made before submitting for PA. He has been receiving weekly testosterone injections for 2 years, administered by Dr. Machado. He has been living his chosen gender identity/role for almost 2 years. He does not bind and has never tried binding. Jesus does not know where to purchase trans binders. He wears sports bras. No breast lumps, skin puckering, nipple drainage, or other breast problems. No history of breast imaging, including mammogram or breast ultrasound. He has Blue Plus MA.     Medical Hx: Gender dysphoria. No history of asthma, diabetes mellitus, or GERD. No bleeding, clotting, healing or scarring problems. Anxiety and depression, managed with medications administered by his psychiatrist. Bipolar disorder with mixed episodes (manic-depressive episodes). PTSD, has done EMDR with his therapist. Underwent a recent weight lost from 242 to 225 lbs.     Surgical Hx: Breast reduction (10/2011), performed by Dr. Samira Campo at [facility]. Septorhinoplasty  "(10/2017).    Family Hx: No family history of ovarian cancer. Maternal grandmother had breast cancer. Mother and maternal grandmother had diabetes (both ). Paternal family history is unknown.    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 Relationship status/family: . Elva is a cook at the [employer]. They have 4 children, all girls. Smoking status: Active smoker. Smokes 1/2 pack of cigarettes per day. Discussed he must quit smoking for at least one month prior to surgery. Alcohol use: Rare intake. Diet: Omnivore - he enjoys vegetables. Caffeine: 3-4 cups of coffee each morning. Occasional energy drink. Exercise: He walks a lot at work. Sleep: Typically wakes up 1-2 times per night, sometimes due to night terrors secondary to PTSD. Reports getting 8-10 hours of sleep per night. No insomnia.     Vitals: Ht 1.651 m (5' 5\")   Wt 102.1 kg (225 lb)   BMI 37.44 kg/m     PE: General: Height: 5' 5\" Weight: 225 lbs 0 oz   Chest: moderate obesity. Decent upper chest contour. Grade 2.5 ptosis. Breasts are still fairly large despite previous reduction. Both breasts fairly similar in size, but the right is slightly larger than the left. Breasts are relatively close at the midline. Bilateral striae noted. Large areolas. Some mild acne along the chest wall. Moderate lateral thoracic rolls. Well healed breast reduction scars. IMF's are equal and approximately 4 cm low. Tiny dogears noted from reduction surgery. Fibro-fatty breast tissue. Old reduction scars palpated. No lymphadenopathy or palpable masses. Photos taken with consent.     A&P: 39 year old biological female transitioning to male who is a good candidate for gender affirming top surgery with one of the following: bilateral simple mastectomy with possible nipple graft reconstruction vs. subcutaneous mastectomy with possible nipple graft reconstruction vs. breast reduction with possible nipple " graft reconstruction. Most likely, he will need  bilateral simple mastectomy with possible nipple graft reconstruction, depending on intraoperative findings and the patient's desired outcome. At this time, Jesus does desire bilateral nipple grafts. The patient will need a pre-op mammogram in addition to an H&P from his PCP. His letter of support needs to be slightly modified and sent back to us by his therapist.     He also met with our Transgender Coordinator to discuss communications with staff and timeline. Any further discussion of risks and complications will be reviewed during the pre-op visit. Once we receive his modified therapist letter of support, notification of smoking cessation and mammogram results, we will initiate the prior authorization process.     According to [place] Case Law and [facility] standards of care with an appropriate letter of support form a mental health provider top surgery/mastectomy is medically necessary for the treatment of gender dysphoria.     Total time = 30 minutes, over half of which spent discussing surgical options    This note was prepared on behalf of Sheree Bear MD, by Renetta Balderrama, a trained medical scribe, based on my observations and the provider's statements to me.     Sheree Bear MD

## 2019-11-05 NOTE — PROGRESS NOTES
[facility]:  Care Coordination Note     SITUATION   Patient (Jesus, He/Him) is a 39 year old who is receiving support for:  Clinic Care Coordination - Face To Face and Consult For (Top surgery with Marianela)  .    BACKGROUND     [facility] pt attended [facility] consult with Dr. Bear; pt was accompanied by wife, Elva who will help with post-op care.     ASSESSMENT     Surgery              [facility] Assessment  Comprehensive Gender Care ([facility]) Enrollment: Enrolled(Hormones started 10/2017 at [facility], Dr. Machado)  Patient has a therapist: Yes  Name of therapist:  Elle Colón clinical psychologist   Letter of support #1: Received  Letter #1 Date: 10/16/19(Needs minor edit to include age of majority to make informed medical decision. )  Surgery being considered: Yes  Mastectomy: Yes    Pt had breast reduction in 2010, ultimately not the right type of surgery. Pt has two young children who will be cared or by his wife during recover. 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       PLAN       Nursing Interventions:   [facility] program and services discussed with patient. Educational surgical packet provided and reviewed with patient. Process for accessing surgery discussed, including: WPATH standards of care, letters of support, treatment plan action steps, PA insurance process, surgery scheduling, and approximate timeline.     Pt has letter of support in chart, Letter needs minor change. Pt to see Therapist on 11/6/19 and will obtain edits at this appointment. RAGHAVENDRA and photography consent signed by patient.  Pt questions answered within scope of practice.     Follow-up plan:  Pt to obtain minor edit with letter of support.  See Dr. Bear's note for additional information.        Devon Vasquez

## 2019-11-05 NOTE — PROGRESS NOTES
PLASTICS NEW TOP   HPI: This is a 39 year old biological female transitioning to male with a history of gender dysphoria, anxiety, depression, bipolar disorder and PTSD who presents today with his wife, Elva, for a consultation for top surgery. He prefers pronouns he/him and his preferred name is Jesus. He was referred by his PCP, Dr. Emerson Machado at [facility], and made his appointment 11 months ago. He had an appointment in  but was forced to be rescheduled to November. His therapist is Caryn Colón at [facility], and Jesus has been seeing her since . Caryn has written a letter of support for Jesus, but some modifications need to be made before submitting for PA. He has been receiving weekly testosterone injections for 2 years, administered by Dr. Machado. He has been living his chosen gender identity/role for almost 2 years. He does not bind and has never tried binding. Jesus does not know where to purchase trans binders. He wears sports bras. No breast lumps, skin puckering, nipple drainage, or other breast problems. No history of breast imaging, including mammogram or breast ultrasound. He has Blue Plus MA.     Medical Hx: Gender dysphoria. No history of asthma, diabetes mellitus, or GERD. No bleeding, clotting, healing or scarring problems. Anxiety and depression, managed with medications administered by his psychiatrist. Bipolar disorder with mixed episodes (manic-depressive episodes). PTSD, has done EMDR with his therapist. Underwent a recent weight lost from 242 to 225 lbs.     Surgical Hx: Breast reduction (10/2011), performed by Dr. Samira Campo at [facility]. Septorhinoplasty (10/2017).    Family Hx: No family history of ovarian cancer. Maternal grandmother had breast cancer. Mother and maternal grandmother had diabetes (both ). Paternal family history is unknown.    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 Relationship  "status/family: . Elva is a cook at the [employer]. They have 4 children, all girls. Smoking status: Active smoker. Smokes 1/2 pack of cigarettes per day. Discussed he must quit smoking for at least one month prior to surgery. Alcohol use: Rare intake. Diet: Omnivore - he enjoys vegetables. Caffeine: 3-4 cups of coffee each morning. Occasional energy drink. Exercise: He walks a lot at work. Sleep: Typically wakes up 1-2 times per night, sometimes due to night terrors secondary to PTSD. Reports getting 8-10 hours of sleep per night. No insomnia.     Vitals: Ht 1.651 m (5' 5\")   Wt 102.1 kg (225 lb)   BMI 37.44 kg/m    PE: General: Height: 5' 5\" Weight: 225 lbs 0 oz   Chest: moderate obesity. Decent upper chest contour. Grade 2.5 ptosis. Breasts are still fairly large despite previous reduction. Both breasts fairly similar in size, but the right is slightly larger than the left. Breasts are relatively close at the midline. Bilateral striae noted. Large areolas. Some mild acne along the chest wall. Moderate lateral thoracic rolls. Well healed breast reduction scars. IMF's are equal and approximately 4 cm low. Tiny dogears noted from reduction surgery. Fibro-fatty breast tissue. Old reduction scars palpated. No lymphadenopathy or palpable masses. Photos taken with consent.     A&P: 39 year old biological female transitioning to male who is a good candidate for gender affirming top surgery with one of the following: bilateral simple mastectomy with possible nipple graft reconstruction vs. subcutaneous mastectomy with possible nipple graft reconstruction vs. breast reduction with possible nipple graft reconstruction. Most likely, he will need  bilateral simple mastectomy with possible nipple graft reconstruction, depending on intraoperative findings and the patient's desired outcome. At this time, Jesus does desire bilateral nipple grafts. The patient will need a pre-op mammogram in addition to an H&P " from his PCP. His letter of support needs to be slightly modified and sent back to us by his therapist.     He also met with our Transgender Coordinator to discuss communications with staff and timeline. Any further discussion of risks and complications will be reviewed during the pre-op visit. Once we receive his modified therapist letter of support, notification of smoking cessation and mammogram results, we will initiate the prior authorization process.     According to [place] Case Law and [facility] standards of care with an appropriate letter of support form a mental health provider top surgery/mastectomy is medically necessary for the treatment of gender dysphoria.     Total time = 30 minutes, over half of which spent discussing surgical options    This note was prepared on behalf of Sheree Bear MD, by Renetta Balderrama, a trained medical scribe, based on my observations and the provider's statements to me.

## 2019-11-05 NOTE — NURSING NOTE
"Chief Complaint   Patient presents with     Consult     Pt here for consult for top surgery       Vitals:    11/05/19 0905   Weight: 102.1 kg (225 lb)   Height: 1.651 m (5' 5\")       Body mass index is 37.44 kg/m .      ELVIN Pabon NREMT                    No vitals taken per provider  "

## 2019-11-06 ENCOUNTER — MEDICAL CORRESPONDENCE (OUTPATIENT)
Dept: HEALTH INFORMATION MANAGEMENT | Facility: CLINIC | Age: 39
End: 2019-11-06

## 2019-11-07 ENCOUNTER — HEALTH MAINTENANCE LETTER (OUTPATIENT)
Age: 39
End: 2019-11-07

## 2019-11-08 ENCOUNTER — PATIENT OUTREACH (OUTPATIENT)
Dept: PLASTIC SURGERY | Facility: CLINIC | Age: 39
End: 2019-11-08

## 2019-11-08 NOTE — PROGRESS NOTES
[facility]:  Care Coordination Note     SITUATION   Patient (Jesus, He/Him)  is a 39 year old who is receiving support for:  Clinic Care Coordination - Follow-up (Received edited letter of support)  .    BACKGROUND     Pts therapist sent new letter of support with additional sentence regarding mental state and surgical readiness. No age of majority was added.  Faxed therapist RAGHAVENDRA with [employer] standards and sample letter, requesting small edit.     ASSESSMENT     Surgery              [facility] Assessment  Comprehensive Gender Care ([facility]) Enrollment: Enrolled(Hormones started 10/2017 at [facility], Dr. Machado)  Patient has a therapist: Yes  Name of therapist:  Elle Colón clinical psychologist   Therapist's phone number: [phone]  Letter of support #1: Received  Letter #1 Date: 11/06/19(Needs minor edit to include age of majority to make informed medical decision. )  Surgery being considered: Yes  Mastectomy: Yes          PLAN          Nursing Interventions:   Faxed therapist RAGHAVENDRA with [employer] standards and sample letter, requesting small edit including age of majority, pt can make informed consent regarding medical decisions.     Follow-up plan:  Wait for edited letter of support and mammogram.        Devon Vasquez

## 2019-11-12 ENCOUNTER — MEDICAL CORRESPONDENCE (OUTPATIENT)
Dept: HEALTH INFORMATION MANAGEMENT | Facility: CLINIC | Age: 39
End: 2019-11-12

## 2019-11-14 ENCOUNTER — ANCILLARY PROCEDURE (OUTPATIENT)
Dept: MAMMOGRAPHY | Facility: CLINIC | Age: 39
End: 2019-11-14
Attending: FAMILY MEDICINE
Payer: COMMERCIAL

## 2019-11-14 DIAGNOSIS — Z01.818 PREOPERATIVE EXAMINATION: ICD-10-CM

## 2019-11-14 DIAGNOSIS — F64.0 GENDER DYSPHORIA IN ADULT: ICD-10-CM

## 2019-11-15 ENCOUNTER — TELEPHONE (OUTPATIENT)
Dept: SURGERY | Facility: CLINIC | Age: 39
End: 2019-11-15

## 2019-11-15 ENCOUNTER — PATIENT OUTREACH (OUTPATIENT)
Dept: PLASTIC SURGERY | Facility: CLINIC | Age: 39
End: 2019-11-15

## 2019-11-15 NOTE — PROGRESS NOTES
[facility]:  Care Coordination Note     SITUATION   Patient is a 39 year old who is receiving support for:  Clinic Care Coordination - Follow-up (Mammogram recieved)  .    BACKGROUND     Pt saw Dr. Bear for top consult on 11/5/19.  Pt completed mammogram and obtained new letter of support.     ASSESSMENT     Surgery              [facility] Assessment  Comprehensive Gender Care ([facility]) Enrollment: Enrolled(Hormones started 10/2017 at [facility], Dr. Machado)  Patient has a therapist: Yes  Name of therapist:  Elle Colón clinical psychologist   Therapist's phone number: [phone]  Letter of support #1: Received  Letter #1 Date: 11/12/19(edited letter recieved, included one additional sentence. )  Surgery being considered: Yes  Mastectomy: Yes  Mammogram completed: Yes(11/14/19 - negative)  Examination: Bilateral digital screening mammography with computer  aided detection.     Comparison: None. Baseline.     History/Family history: No symptoms, routine screening. 1 second  degree relative with breast cancer and unreported age. History of  breast reduction.     BREAST DENSITY: Almost entirely fat.     COMMENTS: No suspicious findings in either breast.                                                                   IMPRESSION: BI-RADS CATEGORY: 2 - Benign.     RECOMMENDED FOLLOW-UP: Annual Mammography.     Results to be sent to the patient.     I have personally reviewed the examination and initial interpretation  and I agree with the findings.     JEAN MARI MD        PLAN          Nursing Interventions:   Reviewed letter of support for [facility] standards of care which is adequate.     Follow-up plan:  ZHANG Cruz to review mammogram. Ready to PA.        Devon Vasquez

## 2019-11-20 ENCOUNTER — TELEPHONE (OUTPATIENT)
Dept: SURGERY | Facility: CLINIC | Age: 39
End: 2019-11-20

## 2019-11-20 DIAGNOSIS — F64.0 GENDER DYSPHORIA IN ADOLESCENT AND ADULT: Primary | ICD-10-CM

## 2019-11-20 RX ORDER — CEFAZOLIN SODIUM 2 G/50ML
2 SOLUTION INTRAVENOUS
Status: CANCELLED | OUTPATIENT
Start: 2019-11-20

## 2019-11-20 RX ORDER — CEFAZOLIN SODIUM 1 G/50ML
1 INJECTION, SOLUTION INTRAVENOUS SEE ADMIN INSTRUCTIONS
Status: CANCELLED | OUTPATIENT
Start: 2019-11-20

## 2019-11-20 NOTE — TELEPHONE ENCOUNTER
received [facility] PA approval #[id number] with date span 11/15/19-1/14/20 for bilateral mastectomy

## 2019-11-25 ENCOUNTER — TELEPHONE (OUTPATIENT)
Dept: PLASTIC SURGERY | Facility: CLINIC | Age: 39
End: 2019-11-25

## 2019-11-25 NOTE — TELEPHONE ENCOUNTER
Patient is scheduled for surgery with Dr. Renetta Montoya      Spoke with: Jesus    Date of Surgery: 01/08/2020    Location: [facility]      [address] [place] [id number]      [phone]      [email or web address]    Informed patient they will need an adult  YES    Pre-op with surgeon (if applicable): Patient did not feel it was needed.    H&P: Scheduled with Emerson Machado    Additional imaging/appointments: Post op 01/14/2020    Surgery packet: [email or web address]     Additional comments: Patient expressed understanding of information above.

## 2019-12-06 ENCOUNTER — OFFICE VISIT (OUTPATIENT)
Dept: FAMILY MEDICINE | Facility: CLINIC | Age: 39
End: 2019-12-06
Payer: COMMERCIAL

## 2019-12-06 VITALS
WEIGHT: 223.4 LBS | OXYGEN SATURATION: 97 % | TEMPERATURE: 98 F | SYSTOLIC BLOOD PRESSURE: 115 MMHG | HEART RATE: 93 BPM | BODY MASS INDEX: 37.18 KG/M2 | DIASTOLIC BLOOD PRESSURE: 81 MMHG

## 2019-12-06 DIAGNOSIS — F64.0 GENDER DYSPHORIA IN ADULT: Primary | ICD-10-CM

## 2019-12-06 DIAGNOSIS — Z72.0 TOBACCO ABUSE: ICD-10-CM

## 2019-12-06 DIAGNOSIS — Z30.09 ENCOUNTER FOR OTHER GENERAL COUNSELING OR ADVICE ON CONTRACEPTION: ICD-10-CM

## 2019-12-06 LAB
ALBUMIN SERPL-MCNC: 4.5 MG/DL (ref 3.8–5)
ALP SERPL-CCNC: 110.4 U/L (ref 31.7–110.5)
ALT SERPL-CCNC: 54.2 U/L (ref 0–45)
AST SERPL-CCNC: 27 U/L (ref 0–45)
BILIRUB SERPL-MCNC: <0.4 MG/DL (ref 0.2–1.3)
BUN SERPL-MCNC: 7.6 MG/DL (ref 7–19)
CALCIUM SERPL-MCNC: 9.9 MG/DL (ref 8.5–10.1)
CHLORIDE SERPLBLD-SCNC: 98.5 MMOL/L (ref 98–110)
CHOLEST SERPL-MCNC: 134.8 MG/DL (ref 0–200)
CHOLEST/HDLC SERPL: 3.9 {RATIO} (ref 0–5)
CO2 SERPL-SCNC: 26.3 MMOL/L (ref 20–32)
CREAT SERPL-MCNC: 0.8 MG/DL (ref 0.5–1)
GFR SERPL CREATININE-BSD FRML MDRD: 86.3 ML/MIN/1.7 M2
GLUCOSE SERPL-MCNC: 106.7 MG'DL (ref 70–99)
HCT VFR BLD AUTO: 53.5 % (ref 35–47)
HDLC SERPL-MCNC: 34.7 MG/DL
HEMOGLOBIN: 15.6 G/DL (ref 11.7–15.7)
LDLC SERPL CALC-MCNC: 69 MG/DL (ref 0–129)
MCH RBC QN AUTO: 28.6 PG (ref 26.5–35)
MCHC RBC AUTO-ENTMCNC: 29.2 G/DL (ref 32–36)
MCV RBC AUTO: 98.2 FL (ref 78–100)
PLATELET # BLD AUTO: 307 K/UL (ref 150–450)
POTASSIUM SERPL-SCNC: 4 MMOL/DL (ref 3.3–4.5)
PROT SERPL-MCNC: 7.8 G/DL (ref 6.8–8.8)
RBC # BLD AUTO: 5.45 M/UL (ref 3.8–5.2)
SODIUM SERPL-SCNC: 136 MMOL/L (ref 132.6–141.4)
TRIGL SERPL-MCNC: 155.1 MG/DL (ref 0–150)
VLDL CHOLESTEROL: 31 MG/DL (ref 7–32)
WBC # BLD AUTO: 10.5 K/UL (ref 4–11)

## 2019-12-06 RX ORDER — BUPROPION HYDROCHLORIDE 150 MG/1
150 TABLET ORAL EVERY MORNING
Refills: 4 | COMMUNITY
Start: 2019-11-14

## 2019-12-06 RX ORDER — POLYETHYLENE GLYCOL 3350 17 G
2 POWDER IN PACKET (EA) ORAL
Qty: 27 LOZENGE | Refills: 0 | Status: SHIPPED | OUTPATIENT
Start: 2019-12-06 | End: 2019-12-30

## 2019-12-06 RX ORDER — TESTOSTERONE CYPIONATE 200 MG/ML
60 INJECTION, SOLUTION INTRAMUSCULAR WEEKLY
Qty: 8 ML | Refills: 2 | Status: SHIPPED | OUTPATIENT
Start: 2019-12-06 | End: 2020-06-08

## 2019-12-06 RX ORDER — TESTOSTERONE CYPIONATE 200 MG/ML
60 INJECTION, SOLUTION INTRAMUSCULAR WEEKLY
Qty: 8 ML | Refills: 2 | Status: SHIPPED | OUTPATIENT
Start: 2019-12-06 | End: 2019-12-06

## 2019-12-06 NOTE — PROGRESS NOTES
Preceptor Attestation:   Patient seen and discussed with the resident. Assessment and plan reviewed with resident and agreed upon.   Supervising Physician:  Nelson Worthington MD  [facility] Medicine

## 2019-12-06 NOTE — PROGRESS NOTES
"       SHAMEKA Lee is a 39 year old individual that uses pronouns He/Him/His/Himself that presents today for follow up of:  masculinizing hormone therapy.     Gender identity: malw    Any special concerns today?  no current concerns. Has quit smoking!!    On hormones?  YES +++ Shot day of the week? Saturday      Due for labs?  Yes      +++ Refills of meds needed?  Yes    Gender affirmation is being sought in these other ways: pronouns, out with family and work.   Top surgery Jan 8th. Physical next week with me.   ---    Past Surgical History:   Procedure Laterality Date     BREAST SURGERY       ENT SURGERY  10/24/2017    Plastic Nose Repair       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)     Chondromalacia patellae of right knee     Gender dysphoria in adolescent and adult     BMI 37.0-37.9, adult     Tobacco abuse, in remission       Current Outpatient Medications   Medication Sig Dispense Refill     Acetaminophen (TYLENOL PO) Take 325 mg by mouth every 4 hours as needed        ARIPiprazole (ABILIFY) 20 MG tablet TK 1 T PO QHS  1     atorvastatin (LIPITOR) 20 MG tablet Take 1 tablet (20 mg) by mouth daily 90 tablet 3     buPROPion (WELLBUTRIN XL) 150 MG 24 hr tablet Take 150 mg by mouth daily Take with 300mg tab for dose of 450 mg daily  4     buPROPion (WELLBUTRIN XL) 300 MG 24 hr tablet Take 300 mg by mouth daily  1      MG capsule   0     escitalopram (LEXAPRO) 5 MG tablet TK 1 T PO QD  1     gabapentin (NEURONTIN) 300 MG capsule TK ONE C PO  QHS  1     needle, disp, 18G X 1\" MISC Use to draw up hormones once weekly 25 each 3     Needle, Disp, 25G X 1-1/2\" MISC Use once weekly for administering hormone IM 25 each 3     nicotine (COMMIT) 2 MG lozenge Place 1 lozenge (2 mg) inside cheek every hour as needed for smoking cessation 27 lozenge 0     syringe, " "disposable, 1 ML MISC Use once weekly to draw up hormones 25 each 3     testosterone cypionate (DEPOTESTOSTERONE) 200 MG/ML injection Inject 0.3 mLs (60 mg) into the muscle once a week 8 mL 2     vitamin D3 (CHOLECALCIFEROL) 2000 units tablet Take 1 tablet by mouth daily 100 tablet 3       History   Smoking Status     Current Every Day Smoker     Packs/day: 0.50     Years: 21.00   Smokeless Tobacco     Never Used     Comment: using nicotine inhaler which helps          Allergies   Allergen Reactions     Nickel Itching     Seroquel [Quetiapine] Other (See Comments)     Restless leg syndrom       Problem, Medication and Allergy Lists were reviewed and are current..         Review of Systems:        General    Fat redistribution: no    Weight change: YES HEENT    Voice change: YES     Cardiovascular (CV)    Chest Pains: no    Shortness of breath: no Chest    Decreased exercise tolerance:  no    Breast changes/development: n/a     Gastrointestinal (GI)    Abdominal pain: no    Change in appetite: YES- less, moderation Skin    Acne or oily skin: YES    Change in hair: YES     Genitourinary ()    Abnormal vaginal bleeding: no     Decreased spontaneous erections: not applicable    Change in libido: no    New sexual partners: no Musculoskeletal    Leg pain or swelling: no     Psychiatric (Psych)    Depression: YES    Anxiety/Panic: YES    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   Wt Readings from Last 10 Encounters:   12/10/19 100.5 kg (221 lb 9.6 oz)   12/06/19 101.3 kg (223 lb 6.4 oz)   11/05/19 102.1 kg (225 lb)   08/08/19 106.5 kg (234 lb 12.8 oz)   07/02/19 107.9 kg (237 lb 12.8 oz)   01/22/19 110.6 kg (243 lb 12.8 oz)   07/06/18 109.6 kg (241 lb 9.6 oz)   02/08/18 108.9 kg (240 lb)   11/10/17 100.7 " kg (222 lb)   10/11/17 100.5 kg (221 lb 9.6 oz)     Appearance: Male appearance and dress    GENERAL:: healthy, alert and no distress  EYES: Eyes grossly normal to inspection, fundi benign and PERRL  NECK: no adenopathy, no asymmetry, no masses,  and thyroid normal to palpation, supple  RESP: lungs clear to auscultation - no rales, no rhonchi, no wheezes  ABDOMEN: soft, no tenderness, no  hepatosplenomegaly, no masses, normal bowel sounds  NEURO: Normal strength and tone, sensory exam grossly normal, mentation intact and speech normal, reflexes symmetric  Psych: Alert and oriented times 3; coherent speech, normal rate and volume, able to articulate logical thoughts, able to abstract reason, no tangential thoughts, no hallucinations or delusions. Affect: Appropriate/mood-congruent           Labs:   Ordered and pending     Assessment and Plan     Jesus was seen today for recheck.    Diagnoses and all orders for this visit:    Gender dysphoria in adult  -     Comprehensive Metabolic Panel ([facility])  -     CBC with Plt ([facility])  -     Lipid Cascade ([facility])  -     Testosterone total  -     Discontinue: testosterone cypionate (DEPOTESTOSTERONE) 200 MG/ML injection; Inject 0.3 mLs (60 mg) into the muscle once a week  -     testosterone cypionate (DEPOTESTOSTERONE) 200 MG/ML injection; Inject 0.3 mLs (60 mg) into the muscle once a week    Pt due for monitoring labs today as above. Will keep dose the same unless t level greatly changed. Pt on statin and controlled in past. Counselled patient about controlled substances: Yes. Not sharing med. Controlled Rx.     Encounter for other general counseling or advice on contraception  Pt on testosterone and does not come into contact with sperm. NO contraception needed.     Tobacco abuse  -     nicotine (COMMIT) 2 MG lozenge; Place 1 lozenge (2 mg) inside cheek every hour as needed for smoking cessation  Pt has quit smoking but would like nicotine replacement for next 3 days  before no nicotine cutoff in preporation for surgery. Will follow up smoking cessation next week.      Follow up:  3 months for HRT  Results by mychart  Questions were elicited and answered.     Emerson Machado DO, MA  Family Medicine PGY-3  [facility] Medicine   Pager: [phone]

## 2019-12-10 ENCOUNTER — OFFICE VISIT (OUTPATIENT)
Dept: FAMILY MEDICINE | Facility: CLINIC | Age: 39
End: 2019-12-10
Payer: COMMERCIAL

## 2019-12-10 VITALS
TEMPERATURE: 97.8 F | DIASTOLIC BLOOD PRESSURE: 82 MMHG | WEIGHT: 221.6 LBS | HEIGHT: 65 IN | BODY MASS INDEX: 36.92 KG/M2 | SYSTOLIC BLOOD PRESSURE: 112 MMHG | OXYGEN SATURATION: 98 % | HEART RATE: 104 BPM

## 2019-12-10 DIAGNOSIS — Z23 ENCOUNTER FOR IMMUNIZATION: ICD-10-CM

## 2019-12-10 DIAGNOSIS — F17.201 TOBACCO ABUSE, IN REMISSION: ICD-10-CM

## 2019-12-10 DIAGNOSIS — Z00.00 ANNUAL PHYSICAL EXAM: Primary | ICD-10-CM

## 2019-12-10 LAB — TESTOST SERPL-MCNC: 269 NG/DL (ref 8–60)

## 2019-12-10 ASSESSMENT — MIFFLIN-ST. JEOR: SCORE: 1683.11

## 2019-12-10 NOTE — PROGRESS NOTES
Physical Note          HPI       Concerns today:   Concern: Quit smoking!   Description of the problem : cold turkey. Mornings are hard. Triggers include coffee.          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)     Chondromalacia patellae of right knee     Gender dysphoria in adolescent and adult     BMI 37.0-37.9, adult     Tobacco abuse, in remission       Past Medical History:   Diagnosis Date     Arthritis      Depressive disorder         Family History   Problem Relation Age of Onset     Diabetes Mother      Mental Illness Mother      Diabetes Maternal Grandmother      Breast Cancer Maternal Grandmother      Mental Illness Maternal Grandmother      Mental Illness Maternal Grandfather      Mental Illness Brother               Review of Systems:     Review of Systems:  CONSTITUTIONAL: NEGATIVE for fever, chills, change in weight  INTEGUMENTARY/SKIN: NEGATIVE for worrisome rashes, moles or lesions  EYES: NEGATIVE for vision changes or irritation  ENT/MOUTH: NEGATIVE for ear, mouth and throat problems  RESP: NEGATIVE for significant cough or SOB  BREAST: NEGATIVE for masses, tenderness or discharge  CV: NEGATIVE for chest pain, palpitations or peripheral edema  GI: NEGATIVE for nausea, abdominal pain, heartburn, or change in bowel habits  : NEGATIVE for frequency, dysuria, or hematuria  MUSCULOSKELETAL: NEGATIVE for significant arthralgias or myalgia  NEURO: NEGATIVE for weakness, dizziness or paresthesias  ENDOCRINE: NEGATIVE for temperature intolerance, skin/hair changes  HEME/ALLERGY: NEGATIVE for bleeding problems  PSYCHIATRIC: NEGATIVE for changes in mood or affect  Sleep:   Do you snore most or the night (as reported by a family member)? No  Do you feel sleepy or extremely tired during most of the day? No         Social History     Social History      Socioeconomic History     Marital status: Significant other     Spouse name: Not on file     Number of children: Not on file     Years of education: Not on file     Highest education level: Not on file   Occupational History     Not on file   Social Needs     Financial resource strain: Not on file     Food insecurity:     Worry: Not on file     Inability: Not on file     Transportation needs:     Medical: Not on file     Non-medical: Not on file   Tobacco Use     Smoking status: Current Every Day Smoker     Packs/day: 0.50     Years: 21.00     Pack years: 10.50     Smokeless tobacco: Never Used     Tobacco comment: using nicotine inhaler which helps   Substance and Sexual Activity     Alcohol use: No     Drug use: No     Comment: sober since 2014     Sexual activity: Yes     Partners: Female   Lifestyle     Physical activity:     Days per week: Not on file     Minutes per session: Not on file     Stress: Not on file   Relationships     Social connections:     Talks on phone: Not on file     Gets together: Not on file     Attends Jehovah's witness service: Not on file     Active member of club or organization: Not on file     Attends meetings of clubs or organizations: Not on file     Relationship status: Not on file     Intimate partner violence:     Fear of current or ex partner: Not on file     Emotionally abused: Not on file     Physically abused: Not on file     Forced sexual activity: Not on file   Other Topics Concern     Not on file   Social History Narrative     Not on file       Marital Status:  Who lives in your household? Wife and 4 children- 2 14yo and 3yo and 3yo     Has anyone hurt you physically, for example by pushing, hitting, slapping or kicking you or forcing you to have sex? Denies  Do you feel threatened or controlled by a partner, ex-partner or anyone in your life? Denies    Sexual Health     Sexual concerns: No   STI History: Neg  Pregnancy History: No obstetric history on file.  LMP No LMP  "recorded. Patient has had an injection. on Testosterone   Last Pap Smear Date: Up top date   Abnormal Pap History: None      Recommended Screening     Up to date on screening.          Physical Exam:     Vitals: /82   Pulse 104   Temp 97.8  F (36.6  C) (Oral)   Ht 1.638 m (5' 4.5\")   Wt 100.5 kg (221 lb 9.6 oz)   SpO2 98%   BMI 37.45 kg/m    BMI= Body mass index is 37.45 kg/m .   GENERAL: healthy, alert and no distress  EYES: Eyes grossly normal to inspection, extraocular movements - intact, and PERRL  HENT: ear canals- normal; TMs- normal; Nose- normal; Mouth- no ulcers, no lesions  NECK: no tenderness, no adenopathy, no asymmetry, no masses, no stiffness; thyroid- normal to palpation  RESP: lungs clear to auscultation - no rales, no rhonchi, no wheezes  BREAST: no masses, no tenderness, no nipple discharge, no palpable axillary masses or adenopathy  CV: regular rates and rhythm, normal S1 S2, no S3 or S4 and no murmur, no click or rub -  ABDOMEN: soft, no tenderness, no  hepatosplenomegaly, no masses, normal bowel sounds  MS: extremities- no gross deformities noted, no edema  SKIN: no suspicious lesions, no rashes  NEURO: strength and tone- normal, sensory exam- grossly normal, mentation- intact, speech- normal, reflexes- symmetric  BACK: no CVA tenderness, no paralumbar tenderness  PSYCH: Alert and oriented times 3; speech- coherent , normal rate and volume; able to articulate logical thoughts, able to abstract reason, no tangential thoughts, no hallucinations or delusions, affect- normal  LYMPHATICS: ant. cervical- normal, post. cervical- normal, axillary- normal, supraclavicular- normal      Assessment and Plan      Jesus was seen today for physical.    Diagnoses and all orders for this visit:    Annual physical exam    Tobacco abuse, in remission    BMI 37.0-37.9, adult    Encounter for immunization    - PPSV23    38yo transmale here for CPE. Elevated BMI and is losing weight intentionally and " praised efforts. Normal Physical Exam otherwise. Pt is on T and does not come into contact with sperm, thus Contraception not needed. Has quit smoking for upcoming surgery. Will follow up annually for preventative care. Due for PPSV23 given smoking history and accepts today      Options for treatment and follow-up care were reviewed with the patient . Jesus Hurd and/or guardian engaged in the decision making process and verbalized understanding of the options discussed and agreed with the final plan.    Emerson Machado DO, MA  Family Medicine PGY-3  [facility], [facility]   Pager: [phone]

## 2019-12-30 ENCOUNTER — OFFICE VISIT (OUTPATIENT)
Dept: FAMILY MEDICINE | Facility: CLINIC | Age: 39
End: 2019-12-30
Payer: COMMERCIAL

## 2019-12-30 VITALS
OXYGEN SATURATION: 96 % | WEIGHT: 226.8 LBS | DIASTOLIC BLOOD PRESSURE: 74 MMHG | TEMPERATURE: 98 F | BODY MASS INDEX: 37.79 KG/M2 | SYSTOLIC BLOOD PRESSURE: 112 MMHG | RESPIRATION RATE: 16 BRPM | HEART RATE: 73 BPM | HEIGHT: 65 IN

## 2019-12-30 DIAGNOSIS — Z01.818 PREOP GENERAL PHYSICAL EXAM: ICD-10-CM

## 2019-12-30 DIAGNOSIS — F64.0 GENDER DYSPHORIA IN ADOLESCENT AND ADULT: Primary | ICD-10-CM

## 2019-12-30 ASSESSMENT — MIFFLIN-ST. JEOR: SCORE: 1714.64

## 2019-12-30 NOTE — PROGRESS NOTES
Preceptor Attestation:   Patient seen, evaluated and discussed with the resident. I have verified the content of the note, which accurately reflects my assessment of the patient and the plan of care.   Supervising Physician:  Sowmya Garza MD

## 2019-12-30 NOTE — PROGRESS NOTES
[facility]  [address] [place] [address],  [address]  [facility] [address]  Phone: [phone]  Fax: [phone]    12/30/2019    Adult PRE-OP Evaluation:    Jesus Hurd, 1980 presents for pre-operative evaluation and assessment as requested by Dr. Bear, prior to undergoing surgery/procedure for treatment of bilateral mastectomy with nipple grafting.    Proposed procedure: Breast removal    Date of Surgery/ Procedure: 01/08/2020  Hospital/Surgical Facility:    [facility]      [address]      Fax: [phone]      Phone: [phone]    [facility]      [address]      Fax: [phone]      Phone: [phone]     Primary Physician: Emerson Machado  Type of Anesthesia Anticipated: General  History of anesthesia complications: NONE  History of  abnormal bleeding: NONE   History of blood transfusions: NO  Patient has a Health Care Directive or Living Will:  NO    Preoperative Questions   1. NO - Do you have a history of heart attack, stroke, stent, bypass or surgery on an artery in the head, neck, heart or legs?  2. NO - Do you ever have any pain or discomfort in your chest?  3. NO - Have you ever had a severe pain across the front of your chest lasting for half an hour or more?  4. NO - Do you have a history of Congestive Heart Failure?  5. NO - Are you troubled by shortness of breath when: walking on the level/ up a slight hill/ at night?  6. NO - Does your chest ever sound wheezy or whistling?  7. NO - Do you currently have a cold, bronchitis or other respiratory infection?  8. NO - Have you had a cold, bronchitis or other respiratory infection within the last 2 weeks?  9. NO - Do you usually have a cough?  10. NO - Do you sometimes get pains in the calves of your legs when you walk?  11. NO - Do you or anyone in your family have previous history of blood clots?  12. NO - Do you or does anyone in your family have a serious bleeding problem such as prolonged  "bleeding following surgeries or cuts?  13. NO - Have you ever had problems with anemia or been told to take iron pills?  14. NO - Have you had any abnormal blood loss such as black, tarry or bloody stools, or abnormal vaginal bleeding?  15. NO - Have you ever had a blood transfusion?  16. NO - Have you or any of your relatives ever had problems with anesthesia?  17. NO - Do you have sleep apnea, excessive snoring or daytime drowsiness?  18. NO - Do you have any prosthetic heart valves?  19. NO - Do you have prosthetic joints?  20. NO - Is there any chance that you may be pregnant?    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)     Chondromalacia patellae of right knee     Gender dysphoria in adolescent and adult     BMI 37.0-37.9, adult     Tobacco abuse, in remission       Acetaminophen (TYLENOL PO), Take 325 mg by mouth every 4 hours as needed   ARIPiprazole (ABILIFY) 20 MG tablet, TK 1 T PO QHS  atorvastatin (LIPITOR) 20 MG tablet, Take 1 tablet (20 mg) by mouth daily  buPROPion (WELLBUTRIN XL) 150 MG 24 hr tablet, Take 150 mg by mouth daily Take with 300mg tab for dose of 450 mg daily  buPROPion (WELLBUTRIN XL) 300 MG 24 hr tablet, Take 300 mg by mouth daily   MG capsule,   escitalopram (LEXAPRO) 5 MG tablet, TK 1 T PO QD  gabapentin (NEURONTIN) 300 MG capsule, TK ONE C PO  QHS  needle, disp, 18G X 1\" MISC, Use to draw up hormones once weekly  Needle, Disp, 25G X 1-1/2\" MISC, Use once weekly for administering hormone IM  syringe, disposable, 1 ML MISC, Use once weekly to draw up hormones  testosterone cypionate (DEPOTESTOSTERONE) 200 MG/ML injection, Inject 0.3 mLs (60 mg) into the muscle once a week  vitamin D3 (CHOLECALCIFEROL) 2000 units tablet, Take 1 tablet by mouth daily  nicotine (COMMIT) 2 MG lozenge, Place 1 lozenge (2 mg) inside cheek every hour as " needed for smoking cessation (Patient not taking: Reported on 12/30/2019)    No current facility-administered medications on file prior to visit.       OTC products: None, except as noted above    Allergies   Allergen Reactions     Nickel Itching     Seroquel [Quetiapine] Other (See Comments)     Restless leg syndrom     Latex Allergy: NO    Social History     Socioeconomic History     Marital status: Significant other     Spouse name: Not on file     Number of children: Not on file     Years of education: Not on file     Highest education level: Not on file   Occupational History     Not on file   Social Needs     Financial resource strain: Not on file     Food insecurity:     Worry: Not on file     Inability: Not on file     Transportation needs:     Medical: Not on file     Non-medical: Not on file   Tobacco Use     Smoking status: Current Every Day Smoker     Packs/day: 0.50     Years: 21.00     Pack years: 10.50     Smokeless tobacco: Never Used     Tobacco comment: using nicotine inhaler which helps   Substance and Sexual Activity     Alcohol use: No     Drug use: No     Comment: sober since 2014     Sexual activity: Yes     Partners: Female   Lifestyle     Physical activity:     Days per week: Not on file     Minutes per session: Not on file     Stress: Not on file   Relationships     Social connections:     Talks on phone: Not on file     Gets together: Not on file     Attends Mosque service: Not on file     Active member of club or organization: Not on file     Attends meetings of clubs or organizations: Not on file     Relationship status: Not on file     Intimate partner violence:     Fear of current or ex partner: Not on file     Emotionally abused: Not on file     Physically abused: Not on file     Forced sexual activity: Not on file   Other Topics Concern     Not on file   Social History Narrative     Not on file       REVIEW OF SYSTEMS:   Constitutional, HEENT, cardiovascular, pulmonary, GI, ,  "musculoskeletal, neuro, skin, endocrine and psych systems are negative, except as otherwise noted.    EXAM:   /74   Pulse 73   Temp 98  F (36.7  C) (Oral)   Resp 16   Ht 1.651 m (5' 5\")   Wt 102.9 kg (226 lb 12.8 oz)   SpO2 96%   BMI 37.74 kg/m    GENERAL: healthy, alert and no distress  EYES: Eyes grossly normal to inspection, extraocular movements - intact, and PERRL  HENT: ear canals- normal; TMs- normal; Nose- normal; Mouth- no ulcers, no lesions  NECK: no tenderness, no adenopathy, no asymmetry, no masses, no stiffness; thyroid- normal to palpation  RESP: lungs clear to auscultation - no rales, no rhonchi, no wheezes  CV: regular rates and rhythm, normal S1 S2, no S3 or S4 and no murmur, no click or rub -  ABDOMEN: soft, no tenderness, no  hepatosplenomegaly, no masses, normal bowel sounds  MS: extremities- no gross deformities noted, no edema  SKIN: no suspicious lesions, no rashes  NEURO: strength and tone- normal, sensory exam- grossly normal, mentation- intact, speech- normal, reflexes- symmetric  BACK: no CVA tenderness, no paralumbar tenderness  PSYCH: Alert and oriented times 3; speech- coherent , normal rate and volume; able to articulate logical thoughts  LYMPHATICS: ant. cervical- normal, post. cervical- normal    DIAGNOSTICS:      No EKG needed, low risk. CMP, CBC within last month. Normal lytes and hgb 15.6.     RISK ASSESSMENT:     Cardiovascular Risk:  -Patient is able to participate in strenuous activities without chest pain.  -The patient does not have chest pain with exertion.  -Patient does not have a history of congestive heart failure.    -The patient does not have a history of stroke and does not have a history of valvular disease.    Pulmonary Risk:  -In terms of risk factors for pulmonary complication, the patient has no risk factors    Perioperative Complications:  -The patient does not have a history of bleeding or clotting problems in the past.    -The patient has not had " complications from surgeries.    -The patient does not have a family history of any anesthesia or surgical complications.      IMPRESSION:   Reason for surgery/procedure: gender dysphoria     The proposed surgical procedure is considered INTERMEDIATE risk.    For above listed surgery and anesthesia:   Patient is at LOW risk for surgery/procedure and perioperative/procedure complications.    RECOMMENDATIONS:   Labs: none    Fasting:  NPO for 12 hours prior to surgery    Preop Plan:  --Approval given to proceed with proposed procedure, without further diagnostic evaluation    Medications:  Patient should take their regular medications the morning of surgery unless otherwise instructed.    Hold ibuprofen for 5 days prior.    Please contact our office if there are any further questions or information required about this patient.    Emerson Machado DO, MA  Family Medicine PGY-3  [facility], [facility]   Pager: [phone]

## 2019-12-31 ENCOUNTER — OFFICE VISIT (OUTPATIENT)
Dept: PLASTIC SURGERY | Facility: CLINIC | Age: 39
End: 2019-12-31
Payer: COMMERCIAL

## 2019-12-31 DIAGNOSIS — F64.0 GENDER DYSPHORIA IN ADULT: Primary | ICD-10-CM

## 2019-12-31 ASSESSMENT — PAIN SCALES - GENERAL: PAINLEVEL: NO PAIN (0)

## 2019-12-31 NOTE — NURSING NOTE
Chief Complaint   Patient presents with     Pre-Op Exam     Pt here for pre op visit       There were no vitals filed for this visit.    There is no height or weight on file to calculate BMI.      ELVIN Pabon NREMT                    No vitals taken per provider

## 2019-12-31 NOTE — PATIENT INSTRUCTIONS
Bilateral Mastectomy   Pre and Post op Surgery Instructions    You are scheduled for mastectomy with nipple grafts on 1/8/20 at 1pm.    You will need to arrive at 11am [place] [address]. You can park in the Green Lot and check in on 3rd floor. (See attached map).       Before Surgery:  - 8 hours prior to surgery stop eating solid food. You can continue to drink clear liquids (water, apple juice, Gatorade) up to 2 hours before surgery. If you have any medications that need to be taken in this timeframe, you can take them with a small sip of water    - No Ibuprofen, Advil, Aleve, Naproxen, Motrin, Aspirin for 7 days prior to surgery. If you are having pain in the week before surgery Tylenol is okay to take.    - Wear a button up or zip up shirt    - Wash with surgical soap:      Showering with an antiseptic soap prior to an invasive procedure will decrease the  bacteria that is normally found on the skin.     Using 1/2 of the bottle for each application.  Be sure to use the whole bottle before coming to surgery.    The evening before surgery, shower or bathe as you normally would, using your preferred soap.  Give special attention to areas where the incisions will be made. Rinse thoroughly.  You may wash your hair with your regular shampoo.     Next wash your entire body, from the chin down, with the special antiseptic soap (full strength) using a freshly laundered clean washcloth, again giving special attention to areas where incisions will be made.    Rinse thoroughly and dry off using a freshly laundered clean towel.     Wear clean clothes/pajamas and sleep on clean sheets    Repeat steps 2 and 3 in the morning before coming to surgery (using the rest of the bottle of soap).      Events of day:   - After you check in you will meet with a pre-op nurse. They will go over your medications, review your H&P (pre-op physical), have you change into  "a paper gown, and your chest will be wiped again with additional soap (some people have a reaction to the soap, if you have a reaction to the surgical soap let the nurse know).     - They will also place compression boots on both legs to help decrease risk of blood clots.     - You may be asked to complete a pregnancy test. If you have had no exposure to sperm, you can decline.    -You will meet with the anesthesiologists and nurse anesthetist who will be keeping you asleep during surgery, placing an IV, and monitoring you throughout the surgery.    -You will meet with the RN as you are being moved into operating room, they will be helping to position you and keep you comfortable during the surgery.     - Dr. Bear will meet you in the pre-op area to discuss any questions about surgery, make markings on your chest for planning, and to sign your consent.     - A catheter will be placed after you are sleeping and is typically removed before you wake. The longest this would typically be in is in the post-op recovery room if needed.     - There will be straps and pillows to help position you and keep you in place.    - The tissue that is removed  will be sent to pathology and then discarded.     -MARINA drains will be placed (one in each armpit) and a pain catheter will be placed in the center of your chest.     -Closure is done with dissolvable sutures under the skin and is then sealed with glue, and tape.    - Post-Op medications you will be given in addition to the anesthesia include; Zofran (nausea), Oxycodone (pain), Z-hermila (infection), and antipruritics (itching).     - You can usually go home the same day so long as you are eating, drinking, voiding, and pain is well controlled.  You will be sent home with all needed supplies.       Post-Op Cares:   -No lifting over 5 lbs for 3 weeks    -No stretching arms out or above the head (\"modified T-augusta\")    -Walk around frequently to help prevent blood clots    -Deep breath " to prevent pneumonia    -No sleeping on stomach, if it is difficult not to roll over onto your stomach you can sleep in a recliner or use additional pillows    -No ice packs or heat packs    -Preventing constipation will be your responsibility. You can use whatever method works best for you such as; aloe, prune juice, Sennokot or Miralax.    -No showering in the first week.         Nipple Care:   Change nipple dressings daily.  Take dressings off prior to showering and reapply after showering.  No direct shower spray on nipple grafts for 4 weeks.     Cut vaseline gauze to nipple size and place over nipple.  Place folded white gauze over vaseline gauze and cover with plastic dressing.  Do dressing changes for 1 more week.  If you continue to have drainage, continue the dressings until drainage stops.       Drain Care:  You will be discharged with Raheel-Rollins (MARINA) drainage tubes.  These tubes drain fluid from your incision, helping to prevent swelling and reducing the risk for infection.  The tube is held in place by a few stitches. Pin your MARINA drain to your clothing by using a safety pin through the plastic loop on the top of the bulb. If the drain is not attached to your clothing, it may pull out from under your skin. Drains are uncomfortable!    Emptying the drain bulb: The measuring cup provided by the hospital or a measuring cup that is used only for the MARINA drain.  1. Wash your hands with soap and water.  2. Hold the bulb securely.  3. Remove the drainage plug from the emptying port.  4. Carefully turn the bulb upside down over the measuring cup, and gently squeeze all of the drainage into the measuring cup.  5. Squeeze the middle of the bulb firmly so that the bulb is as flat as possible.                                                                                                                                                    6. While still squeezing the bulb, replace the drainage plug. This step is  important to keep the drain suction  7. Measure how much fluid you removed from the bulb.  8. Write down the amount and color of the fluid you removed from the bulb. If  you have more than one drain, keep a separate record for each one.    9. Empty the fluid into the toilet and flush.  10. Rinse the measuring cup, and wash your hands with soap and water.  11. You should empty the drain at least two times each day, in the morning and at bedtime.  12. Drainage tubes are removed when the output is less than 20ml in a 24 hour period for 2 consecutive days.  13. Output will be somewhere between 30 to 50 mLs per day, but don't be alarmed if it is more or less. Output may not be equal between sides. Amounts will probably decrease over time.  14. The color coming from the drains may vary from deep red, light pink, or clear yellow.    Stripping the tube:  To prevent clots from blocking the drain, you will need to  strip  it. Stripping means that you use your fingers to squeeze along the length of the drain to help maintain the flow of drainage.    Wash your hands.    Using one hand, firmly hold the tubing near the insertion site (as close to your skin as the ace wrap and gauze dressing will allow). This will prevent the drain from being pulled out while you are stripping it and from pulling on skin and sutures.    Rifle upper/top fingers and milk with the bottom or lower fingers.    Using your index finger and thumb of the other hand, squeeze the tubing below the  first hand. You should squeeze it firmly enough so the tubing becomes flat.     Maintain pressure on the tube, as you are squeezing, slide your index finger and thumb down the tube about 4-6 inches toward the bulb. (use alcohol wipes or lotion to help).    Then, release the hand closest to where the tube is attached to the body (making sure to keep pressure with the other hand), and move upper/anchor hand down. Squeeze, Repeat in segments.    Do not release the  pressure you are creating in the tubing until you reach the bulb.  The whole tube will be flat.     If at any time it feels like the tube is pulling away from the skin or starts to hurt STOP! Then start over again more gently.     Strip the drain each time you empty it.

## 2019-12-31 NOTE — LETTER
12/31/2019       RE: Jesus Hurd  [address] [place] [address]     Dear Colleague,    Thank you for referring your patient, Jesus Hurd, to the [facility] PLASTIC AND RECONSTRUCTIVE SURGERY at [facility]. Please see a copy of my visit note below.    PLASTICS PREOP VISIT    Jesus is here today with his partner and one of their 16 year old daughters.   He is scheduled for bilateral simple mastectomy with nipple grafts on 1/8/20 at 1pm.   He had his H&P yesterday and his previous mammogram was negative.   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    He met with our RN coordinator Nancy, who discussed preop prep and details leading up to the surgery.   I reviewed the events in the OR and we discussed possible risks and complications, as well as reviewing the postop cares and limitations required for a safe and effective recovery.     Their questions and concerns were addressed to their satisfaction. Any additional issues can be discussed the day of surgery. It sounds like there are additional smaller children (ages 2 and 4) at home that they will find caregivers for to prevent inadvertent injury to his postop surgical sites.     We will see him on the 8th.     Total time = 10 minutes, all spent on educating the patient and family about upcoming surgery with periop cares.    Again, thank you for allowing me to participate in the care of your patient.      Sincerely,    Sheree Bear MD

## 2020-01-01 NOTE — PROGRESS NOTES
PLASTICS PREOP VISIT    Jesus is here today with his partner and one of their 16 year old daughters.   He is scheduled for bilateral simple mastectomy with nipple grafts on 1/8/20 at 1pm.   He had his H&P yesterday and his previous mammogram was negative.   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    He met with our RN coordinator Nancy, who discussed preop prep and details leading up to the surgery.   I reviewed the events in the OR and we discussed possible risks and complications, as well as reviewing the postop cares and limitations required for a safe and effective recovery.     Their questions and concerns were addressed to their satisfaction. Any additional issues can be discussed the day of surgery. It sounds like there are additional smaller children (ages 2 and 4) at home that they will find caregivers for to prevent inadvertent injury to his postop surgical sites.     We will see him on the 8th.     Total time = 10 minutes, all spent on educating the patient and family about upcoming surgery with periop cares.

## 2020-01-08 ENCOUNTER — ANESTHESIA EVENT (OUTPATIENT)
Dept: SURGERY | Facility: CLINIC | Age: 40
End: 2020-01-08
Payer: COMMERCIAL

## 2020-01-08 ENCOUNTER — ANESTHESIA (OUTPATIENT)
Dept: SURGERY | Facility: CLINIC | Age: 40
End: 2020-01-08
Payer: COMMERCIAL

## 2020-01-08 ENCOUNTER — HOSPITAL ENCOUNTER (OUTPATIENT)
Facility: CLINIC | Age: 40
Discharge: HOME | End: 2020-01-08
Attending: SURGERY | Admitting: SURGERY
Payer: COMMERCIAL

## 2020-01-08 VITALS
HEIGHT: 65 IN | WEIGHT: 222.88 LBS | OXYGEN SATURATION: 98 % | TEMPERATURE: 98.1 F | RESPIRATION RATE: 12 BRPM | BODY MASS INDEX: 37.13 KG/M2 | SYSTOLIC BLOOD PRESSURE: 121 MMHG | DIASTOLIC BLOOD PRESSURE: 84 MMHG | HEART RATE: 94 BPM

## 2020-01-08 DIAGNOSIS — F64.0 GENDER DYSPHORIA IN ADOLESCENT AND ADULT: ICD-10-CM

## 2020-01-08 DIAGNOSIS — Z90.13 S/P BILATERAL MASTECTOMY: Primary | ICD-10-CM

## 2020-01-08 LAB
GLUCOSE BLDC GLUCOMTR-MCNC: 96 MG/DL (ref 70–99)
HCG UR QL: NEGATIVE

## 2020-01-08 PROCEDURE — 25000128 H RX IP 250 OP 636: Performed by: NURSE ANESTHETIST, CERTIFIED REGISTERED

## 2020-01-08 PROCEDURE — 27110038 ZZH RX 271: Performed by: SURGERY

## 2020-01-08 PROCEDURE — 82962 GLUCOSE BLOOD TEST: CPT

## 2020-01-08 PROCEDURE — 88305 TISSUE EXAM BY PATHOLOGIST: CPT | Performed by: SURGERY

## 2020-01-08 PROCEDURE — 71000015 ZZH RECOVERY PHASE 1 LEVEL 2 EA ADDTL HR: Performed by: SURGERY

## 2020-01-08 PROCEDURE — 37000008 ZZH ANESTHESIA TECHNICAL FEE, 1ST 30 MIN: Performed by: SURGERY

## 2020-01-08 PROCEDURE — 71000014 ZZH RECOVERY PHASE 1 LEVEL 2 FIRST HR: Performed by: SURGERY

## 2020-01-08 PROCEDURE — 27210794 ZZH OR GENERAL SUPPLY STERILE: Performed by: SURGERY

## 2020-01-08 PROCEDURE — 25000125 ZZHC RX 250: Performed by: NURSE ANESTHETIST, CERTIFIED REGISTERED

## 2020-01-08 PROCEDURE — 36000059 ZZH SURGERY LEVEL 3 EA 15 ADDTL MIN UMMC: Performed by: SURGERY

## 2020-01-08 PROCEDURE — 37000009 ZZH ANESTHESIA TECHNICAL FEE, EACH ADDTL 15 MIN: Performed by: SURGERY

## 2020-01-08 PROCEDURE — 25800030 ZZH RX IP 258 OP 636: Performed by: NURSE ANESTHETIST, CERTIFIED REGISTERED

## 2020-01-08 PROCEDURE — 88305 TISSUE EXAM BY PATHOLOGIST: CPT | Mod: 26,59 | Performed by: SURGERY

## 2020-01-08 PROCEDURE — 25000125 ZZHC RX 250: Performed by: SURGERY

## 2020-01-08 PROCEDURE — 25000132 ZZH RX MED GY IP 250 OP 250 PS 637: Performed by: ANESTHESIOLOGY

## 2020-01-08 PROCEDURE — 81025 URINE PREGNANCY TEST: CPT | Performed by: ANESTHESIOLOGY

## 2020-01-08 PROCEDURE — 25000128 H RX IP 250 OP 636: Performed by: SURGERY

## 2020-01-08 PROCEDURE — 71000027 ZZH RECOVERY PHASE 2 EACH 15 MINS: Performed by: SURGERY

## 2020-01-08 PROCEDURE — 36000057 ZZH SURGERY LEVEL 3 1ST 30 MIN - UMMC: Performed by: SURGERY

## 2020-01-08 PROCEDURE — 25000565 ZZH ISOFLURANE, EA 15 MIN: Performed by: SURGERY

## 2020-01-08 PROCEDURE — 40000170 ZZH STATISTIC PRE-PROCEDURE ASSESSMENT II: Performed by: SURGERY

## 2020-01-08 RX ORDER — DEXAMETHASONE SODIUM PHOSPHATE 4 MG/ML
INJECTION, SOLUTION INTRA-ARTICULAR; INTRALESIONAL; INTRAMUSCULAR; INTRAVENOUS; SOFT TISSUE PRN
Status: DISCONTINUED | OUTPATIENT
Start: 2020-01-08 | End: 2020-01-08

## 2020-01-08 RX ORDER — CEFAZOLIN SODIUM 2 G/100ML
2 INJECTION, SOLUTION INTRAVENOUS
Status: COMPLETED | OUTPATIENT
Start: 2020-01-08 | End: 2020-01-08

## 2020-01-08 RX ORDER — PROPOFOL 10 MG/ML
INJECTION, EMULSION INTRAVENOUS PRN
Status: DISCONTINUED | OUTPATIENT
Start: 2020-01-08 | End: 2020-01-08

## 2020-01-08 RX ORDER — NALOXONE HYDROCHLORIDE 0.4 MG/ML
.1-.4 INJECTION, SOLUTION INTRAMUSCULAR; INTRAVENOUS; SUBCUTANEOUS
Status: DISCONTINUED | OUTPATIENT
Start: 2020-01-08 | End: 2020-01-08 | Stop reason: HOSPADM

## 2020-01-08 RX ORDER — SODIUM CHLORIDE, SODIUM LACTATE, POTASSIUM CHLORIDE, CALCIUM CHLORIDE 600; 310; 30; 20 MG/100ML; MG/100ML; MG/100ML; MG/100ML
INJECTION, SOLUTION INTRAVENOUS CONTINUOUS
Status: DISCONTINUED | OUTPATIENT
Start: 2020-01-08 | End: 2020-01-08 | Stop reason: HOSPADM

## 2020-01-08 RX ORDER — FENTANYL CITRATE 50 UG/ML
INJECTION, SOLUTION INTRAMUSCULAR; INTRAVENOUS PRN
Status: DISCONTINUED | OUTPATIENT
Start: 2020-01-08 | End: 2020-01-08

## 2020-01-08 RX ORDER — CEFAZOLIN SODIUM 1 G/3ML
1 INJECTION, POWDER, FOR SOLUTION INTRAMUSCULAR; INTRAVENOUS SEE ADMIN INSTRUCTIONS
Status: DISCONTINUED | OUTPATIENT
Start: 2020-01-08 | End: 2020-01-08 | Stop reason: HOSPADM

## 2020-01-08 RX ORDER — FENTANYL CITRATE 50 UG/ML
25-50 INJECTION, SOLUTION INTRAMUSCULAR; INTRAVENOUS
Status: DISCONTINUED | OUTPATIENT
Start: 2020-01-08 | End: 2020-01-08 | Stop reason: HOSPADM

## 2020-01-08 RX ORDER — LIDOCAINE HYDROCHLORIDE 20 MG/ML
INJECTION, SOLUTION INFILTRATION; PERINEURAL PRN
Status: DISCONTINUED | OUTPATIENT
Start: 2020-01-08 | End: 2020-01-08

## 2020-01-08 RX ORDER — HYDROXYZINE HYDROCHLORIDE 25 MG/1
25 TABLET, FILM COATED ORAL 3 TIMES DAILY PRN
Qty: 20 TABLET | Refills: 1 | Status: SHIPPED | OUTPATIENT
Start: 2020-01-08 | End: 2020-06-22

## 2020-01-08 RX ORDER — OXYCODONE HYDROCHLORIDE 5 MG/1
5 TABLET ORAL EVERY 4 HOURS PRN
Status: DISCONTINUED | OUTPATIENT
Start: 2020-01-08 | End: 2020-01-08 | Stop reason: HOSPADM

## 2020-01-08 RX ORDER — ONDANSETRON 2 MG/ML
4 INJECTION INTRAMUSCULAR; INTRAVENOUS EVERY 30 MIN PRN
Status: DISCONTINUED | OUTPATIENT
Start: 2020-01-08 | End: 2020-01-08 | Stop reason: HOSPADM

## 2020-01-08 RX ORDER — HYDROMORPHONE HYDROCHLORIDE 1 MG/ML
.3-.5 INJECTION, SOLUTION INTRAMUSCULAR; INTRAVENOUS; SUBCUTANEOUS EVERY 5 MIN PRN
Status: DISCONTINUED | OUTPATIENT
Start: 2020-01-08 | End: 2020-01-08 | Stop reason: HOSPADM

## 2020-01-08 RX ORDER — OXYCODONE HYDROCHLORIDE 10 MG/1
5-10 TABLET ORAL EVERY 4 HOURS PRN
Qty: 20 TABLET | Refills: 0 | Status: SHIPPED | OUTPATIENT
Start: 2020-01-08 | End: 2020-06-22

## 2020-01-08 RX ORDER — ONDANSETRON 2 MG/ML
INJECTION INTRAMUSCULAR; INTRAVENOUS PRN
Status: DISCONTINUED | OUTPATIENT
Start: 2020-01-08 | End: 2020-01-08

## 2020-01-08 RX ORDER — BUPIVACAINE HYDROCHLORIDE 5 MG/ML
INJECTION, SOLUTION PERINEURAL PRN
Status: DISCONTINUED | OUTPATIENT
Start: 2020-01-08 | End: 2020-01-08 | Stop reason: HOSPADM

## 2020-01-08 RX ORDER — ONDANSETRON 4 MG/1
4 TABLET, ORALLY DISINTEGRATING ORAL EVERY 30 MIN PRN
Status: DISCONTINUED | OUTPATIENT
Start: 2020-01-08 | End: 2020-01-08 | Stop reason: HOSPADM

## 2020-01-08 RX ORDER — AZITHROMYCIN 250 MG/1
250 TABLET, FILM COATED ORAL DAILY
Qty: 6 TABLET | Refills: 0 | Status: SHIPPED | OUTPATIENT
Start: 2020-01-08 | End: 2020-06-22

## 2020-01-08 RX ORDER — SODIUM CHLORIDE, SODIUM LACTATE, POTASSIUM CHLORIDE, CALCIUM CHLORIDE 600; 310; 30; 20 MG/100ML; MG/100ML; MG/100ML; MG/100ML
INJECTION, SOLUTION INTRAVENOUS CONTINUOUS PRN
Status: DISCONTINUED | OUTPATIENT
Start: 2020-01-08 | End: 2020-01-08

## 2020-01-08 RX ORDER — PROPOFOL 10 MG/ML
INJECTION, EMULSION INTRAVENOUS CONTINUOUS PRN
Status: DISCONTINUED | OUTPATIENT
Start: 2020-01-08 | End: 2020-01-08

## 2020-01-08 RX ORDER — ONDANSETRON 4 MG/1
4 TABLET, ORALLY DISINTEGRATING ORAL EVERY 8 HOURS PRN
Qty: 20 TABLET | Refills: 1 | Status: SHIPPED | OUTPATIENT
Start: 2020-01-08 | End: 2020-06-22

## 2020-01-08 RX ADMIN — PHENYLEPHRINE HYDROCHLORIDE 50 MCG: 10 INJECTION INTRAVENOUS at 15:12

## 2020-01-08 RX ADMIN — CEFAZOLIN 1 G: 1 INJECTION, POWDER, FOR SOLUTION INTRAMUSCULAR; INTRAVENOUS at 15:10

## 2020-01-08 RX ADMIN — DEXAMETHASONE SODIUM PHOSPHATE 8 MG: 4 INJECTION, SOLUTION INTRAMUSCULAR; INTRAVENOUS at 13:00

## 2020-01-08 RX ADMIN — PROPOFOL 150 MG: 10 INJECTION, EMULSION INTRAVENOUS at 12:51

## 2020-01-08 RX ADMIN — MIDAZOLAM 2 MG: 1 INJECTION INTRAMUSCULAR; INTRAVENOUS at 12:35

## 2020-01-08 RX ADMIN — HYDROMORPHONE HYDROCHLORIDE 0.5 MG: 1 INJECTION, SOLUTION INTRAMUSCULAR; INTRAVENOUS; SUBCUTANEOUS at 14:37

## 2020-01-08 RX ADMIN — PROPOFOL: 10 INJECTION, EMULSION INTRAVENOUS at 16:03

## 2020-01-08 RX ADMIN — FENTANYL CITRATE 50 MCG: 50 INJECTION, SOLUTION INTRAMUSCULAR; INTRAVENOUS at 14:04

## 2020-01-08 RX ADMIN — SODIUM CHLORIDE, POTASSIUM CHLORIDE, SODIUM LACTATE AND CALCIUM CHLORIDE: 600; 310; 30; 20 INJECTION, SOLUTION INTRAVENOUS at 14:03

## 2020-01-08 RX ADMIN — CEFAZOLIN 1 G: 1 INJECTION, POWDER, FOR SOLUTION INTRAMUSCULAR; INTRAVENOUS at 17:10

## 2020-01-08 RX ADMIN — ROCURONIUM BROMIDE 20 MG: 10 INJECTION INTRAVENOUS at 13:28

## 2020-01-08 RX ADMIN — MIDAZOLAM 2 MG: 1 INJECTION INTRAMUSCULAR; INTRAVENOUS at 12:43

## 2020-01-08 RX ADMIN — SUGAMMADEX 200 MG: 100 INJECTION, SOLUTION INTRAVENOUS at 17:58

## 2020-01-08 RX ADMIN — SODIUM CHLORIDE, POTASSIUM CHLORIDE, SODIUM LACTATE AND CALCIUM CHLORIDE: 600; 310; 30; 20 INJECTION, SOLUTION INTRAVENOUS at 17:15

## 2020-01-08 RX ADMIN — FENTANYL CITRATE 50 MCG: 50 INJECTION, SOLUTION INTRAMUSCULAR; INTRAVENOUS at 13:41

## 2020-01-08 RX ADMIN — HYDROMORPHONE HYDROCHLORIDE 0.5 MG: 1 INJECTION, SOLUTION INTRAMUSCULAR; INTRAVENOUS; SUBCUTANEOUS at 13:28

## 2020-01-08 RX ADMIN — SODIUM CHLORIDE, POTASSIUM CHLORIDE, SODIUM LACTATE AND CALCIUM CHLORIDE: 600; 310; 30; 20 INJECTION, SOLUTION INTRAVENOUS at 12:35

## 2020-01-08 RX ADMIN — CEFAZOLIN SODIUM 2 G: 2 INJECTION, SOLUTION INTRAVENOUS at 13:10

## 2020-01-08 RX ADMIN — LIDOCAINE HYDROCHLORIDE 100 MG: 20 INJECTION, SOLUTION INFILTRATION; PERINEURAL at 12:52

## 2020-01-08 RX ADMIN — OXYCODONE HYDROCHLORIDE 5 MG: 5 TABLET ORAL at 20:02

## 2020-01-08 RX ADMIN — ONDANSETRON 4 MG: 2 INJECTION INTRAMUSCULAR; INTRAVENOUS at 17:46

## 2020-01-08 RX ADMIN — FENTANYL CITRATE 100 MCG: 50 INJECTION, SOLUTION INTRAMUSCULAR; INTRAVENOUS at 12:42

## 2020-01-08 RX ADMIN — PROPOFOL 25 MCG/KG/MIN: 10 INJECTION, EMULSION INTRAVENOUS at 13:08

## 2020-01-08 RX ADMIN — ROCURONIUM BROMIDE 50 MG: 10 INJECTION INTRAVENOUS at 12:51

## 2020-01-08 ASSESSMENT — MIFFLIN-ST. JEOR: SCORE: 1696.88

## 2020-01-08 NOTE — ANESTHESIA PREPROCEDURE EVALUATION
Anesthesia Pre-Procedure Evaluation    Patient: Jesus Hurd   MRN:     [id number] Gender:   adult   Age:    39 year old :      1980        Preoperative Diagnosis: Gender dysphoria in adolescent and adult [F64.0]   Procedure(s):  Bilateral Simple Mastectomy, with Nipple Grafts. OnQ     Past Medical History:   Diagnosis Date     Arthritis      Depressive disorder       Past Surgical History:   Procedure Laterality Date     BREAST SURGERY       ENT SURGERY  10/24/2017    Plastic Nose Repair          Anesthesia Evaluation     . Pt has had prior anesthetic. Type: General           ROS/MED HX    ENT/Pulmonary:       Neurologic:  - neg neurologic ROS     Cardiovascular:  - neg cardiovascular ROS       METS/Exercise Tolerance:     Hematologic:  - neg hematologic  ROS       Musculoskeletal:  - neg musculoskeletal ROS       GI/Hepatic:         Renal/Genitourinary:         Endo:         Psychiatric:         Infectious Disease:         Malignancy:         Other:                         PHYSICAL EXAM:   Mental Status/Neuro: A/A/O   Airway: Facies: Feasible  Mallampati: II  Mouth/Opening: Full  TM distance: > 6 cm  Neck ROM: Full   Respiratory: Auscultation: CTAB     Resp. Rate: Normal     Resp. Effort: Normal      CV: Rhythm: Regular  Rate: Age appropriate  Heart: Normal Sounds  Edema: None   Comments: Very anxious, -- will need sedation preop.     Dental: Normal Dentition                LABS:  CBC:   Lab Results   Component Value Date    HGB 15.6 2019    HGB 15.1 2019    HCT 53.5 (H) 2019    HCT 51.1 (H) 2019     BMP:   Lab Results   Component Value Date    .0 2019    .3 2019    POTASSIUM 4.0 2019    POTASSIUM 3.8 2019    CHLORIDE 98.5 2019    CHLORIDE 102.3 2019    CO2 26.3 2019    CO2 24.6 2019    BUN 7.6 2019    BUN 8.9 2019    CR 0.8 2019    CR 1.0 2019    .7 (H) 2019    .5 (H)  "07/02/2019     COAGS: No results found for: PTT, INR, FIBR  POC: No results found for: BGM, HCG, HCGS  OTHER:   Lab Results   Component Value Date    PRAMOD 9.9 12/06/2019    ALBUMIN 3.5 08/11/2017    PROTTOTAL 7.8 12/06/2019    ALT 54.2 (H) 12/06/2019    AST 27.0 12/06/2019    ALKPHOS 110.4 12/06/2019    BILITOTAL <0.4 12/06/2019        Preop Vitals    BP Readings from Last 3 Encounters:   01/08/20 118/77   12/30/19 112/74   12/10/19 112/82    Pulse Readings from Last 3 Encounters:   01/08/20 71   12/30/19 73   12/10/19 104      Resp Readings from Last 3 Encounters:   01/08/20 16   12/30/19 16   08/08/19 16    SpO2 Readings from Last 3 Encounters:   01/08/20 96%   12/30/19 96%   12/10/19 98%      Temp Readings from Last 1 Encounters:   01/08/20 36.5  C (97.7  F) (Oral)    Ht Readings from Last 1 Encounters:   01/08/20 1.651 m (5' 5\")      Wt Readings from Last 1 Encounters:   01/08/20 101.1 kg (222 lb 14.2 oz)    Estimated body mass index is 37.09 kg/m  as calculated from the following:    Height as of this encounter: 1.651 m (5' 5\").    Weight as of this encounter: 101.1 kg (222 lb 14.2 oz).     LDA:        Assessment:   ASA SCORE: 2       NPO Status: NPO Appropriate     Plan:   Anes. Type:  General   Pre-Medication: None   Induction:  IV (Standard)   Airway: ETT; Oral   Access/Monitoring: PIV   Maintenance: Balanced     Postop Plan:   Postop Pain: Opioids  Postop Sedation/Airway: Not planned     PONV Management:   Adult Risk Factors: Female, Postop Opioids                   Sara Zaidi MD  "

## 2020-01-09 ENCOUNTER — VIRTUAL VISIT (OUTPATIENT)
Dept: SURGERY | Facility: CLINIC | Age: 40
End: 2020-01-09
Payer: COMMERCIAL

## 2020-01-09 DIAGNOSIS — Z90.13 S/P MASTECTOMY, BILATERAL: Primary | ICD-10-CM

## 2020-01-09 NOTE — ANESTHESIA POSTPROCEDURE EVALUATION
Anesthesia POST Procedure Evaluation    Patient: Jesus Hurd   MRN:     [id number] Gender:   adult   Age:    39 year old :      1980        Preoperative Diagnosis: Gender dysphoria in adolescent and adult [F64.0]   Procedure(s):  Bilateral Simple Mastectomy, with Nipple Grafts. OnQ   Postop Comments: No value filed.       Anesthesia Type:  Not documented  General    Reportable Event: NO     PAIN: Uncomplicated   Sign Out status: Comfortable, Well controlled pain     PONV: No PONV   Sign Out status:  No Nausea or Vomiting     Neuro/Psych: Uneventful perioperative course   Sign Out Status: Preoperative baseline; Age appropriate mentation     Airway/Resp.: Uneventful perioperative course   Sign Out Status: Non labored breathing, age appropriate RR; Resp. Status within EXPECTED Parameters     CV: Uneventful perioperative course   Sign Out status: Appropriate BP and perfusion indices; Appropriate HR/Rhythm     Disposition:   Sign Out in:  PACU  Disposition:  Phase II; Home  Recovery Course: Uneventful  Follow-Up: Not required     Comments/Narrative:  Patient doing well post-operatively.  No significant issues.  Hemodynamically stable, pain well controlled, nausea well controlled.  Stable for discharge from the PACU             Last Anesthesia Record Vitals:  CRNA VITALS  2020 1744 - 2020 1844      2020             Pulse:  104    SpO2:  96 %          Last PACU Vitals:  Vitals Value Taken Time   /81 2020  7:54 PM   Temp 36.8  C (98.2  F) 2020  6:45 PM   Pulse 108 2020  7:54 PM   Resp 12 2020  7:30 PM   SpO2 96 % 2020  7:54 PM   Temp src     NIBP     Pulse     SpO2     Resp     Temp     Ht Rate     Temp 2           Electronically Signed By: Neo Pickering MD, 2020, 10:13 PM

## 2020-01-09 NOTE — OP NOTE
Procedure Date: 01/08/2020      ATTENDING:  Sheree Bear MD      ASSISTANT:  Tamanna Smith PA-C (No resident available)      PREOPERATIVE DIAGNOSIS:  Gender dysphoria.      POSTOPERATIVE DIAGNOSIS:  Gender dysphoria.      PROCEDURE:     1.  Bilateral simple mastectomies with nipple graft reconstruction.     2.  On-Q catheter placement.      ANESTHESIA:  GET.      ESTIMATED BLOOD LOSS:  100 mL      IV FLUIDS:  2200 mL      URINE OUTPUT:  400 mL      COUNTS:  Correct.      COMPLICATIONS:  None.      DRAINS:  MARINA x2.      SPECIMENS:  Right breast 1327 grams, left breast 1282 grams.      INDICATIONS:  This is a 39-year-old biological female transitioning to male.  They have a diagnosis of gender dysphoria and met WPATH and insurance criteria for gender affirming top surgery.  Due to this patient's significant breast volume and ptosis, they would require double incisions and desired nipple graft reconstruction.  Of note, this patient also has significant lateral thoracic rolls.      DESCRIPTION OF PROCEDURE:  The patient was seen in the preoperative waiting area.  The operative sites were marked.  This included sternal notch, sternal midline, inframammary folds with extensions laterally for removal of dog-ear deformities and a vertical line through the mid breast.  Of note, nipple- areolar complexes were a bit medial.  Of note, this patient has had previous breast reduction mammoplasty.  We did a transposed a horizontal line from mid humerus as well onto the anterior chest.        Informed consent was obtained after reviewing the possible risks and complications including but not limited to the following:  Infection, bleeding, hematoma, seroma formation, poor healing, possible dehiscence, possible spitting sutures, possible hypertrophic scarring, possible asymmetries and residual deformities, possible need for further surgery, possible injury surrounding neurovascular and musculoskeletal structures as well as  intrathoracic and intraaxillary structures, altered sensation of the chest wall, either hyper or hyposensitivity, possible partial or complete loss of nipple graft, and anesthetic risks such as DVT, PE and cardiopulmonary arrest.        The patient was then brought to the operating room, placed supine on the OR table.  After general anesthesia was administered, the patient was oral endotracheally intubated, Moe was placed.  The patient already had sequential compression devices on her lower extremities prior to induction.  Arms were secured to arm boards with 6-inch Ace wraps.  A lower Luis Felipe Hugger was applied.  The patient was put into a sitting position and adjustments were made to achieve symmetry on the table for later comparison.  The chest breast area was then prepped and draped in the usual sterile fashion using ChloraPrep.      After timeout was taken and proper patient and procedure were identified, we made our inframammary fold incisions in the natural crease which was slightly below the previous BRM scars.  This was done with the Peak PlasmaBlade on the left side and traditional cautery and scalpel on the right side.  We left approximately 3 cm of subcutaneous fat along the IMF incision before beveling down to the pectoralis fascia.  Breast mound was elevated off the pectoral fascia up towards the clavicle, medially towards the parasternal border and laterally past the lateral border of the pectoralis major muscle.  Once we had mobilized the breast mound, this allowed us to pull this inferiorly and we amputated the breast mound at a level that was approximately 2 cm below our preoperative marking.  Additional thinning of her flap was done predominantly laterally, just a bit medially.  Our incisions were then temporarily skin stapled and patient put into a sitting position.  This allowed us to make further markings to achieve a more transverse incision and to achieve symmetry.  Also, it became apparent  that we would need to join our incisions in the midline.  These further tailorings were done and all tissue removed was measured off the backtable and sent to pathology for histologic examination.  The patient had significant lateral thoracic rolls and our incisions were extended very posteriorly up along the posterior axillary line.  Once we were happy with our symmetry and our contour, the dissection pockets were irrigated with triple antibiotic solution.  Hemostasis was aggressively achieved with cautery.  Then, #15 round MARINA drains were introduced through separate stab wound incisions laterally and secured with 3-0 nylon suture.  This was draped along the inferior aspect of the dissection pocket.  Dual 10-inch On-Q catheters were percutaneously introduced from the epigastric region and draped along the superior aspect of the dissection pocket.  These were secured with Tegaderm and benzoin.        Definitive closure was then achieved with 3-0 Vicryl deep dermal buried sutures followed by 4-0 Vicryl running subcuticular suture.  This was then dressed with Dermabond Exofin.  Once we had closure, the drains were trimmed and put to bulb suction.  We then turned our attention to the nipple graft reconstruction.  Two cm diameter nipple templates were created and used with the patient in sitting position to localize the most masculine and aesthetically appealing position.  This was approximately 2.5-3 cm above the IMF and approximately 11 cm from the midline incision.  The recipient sites were then deepithelialized sharply with a #15 blade.  Hemostasis was achieved essentially with direct pressure.  The nipple-areolar complexes that have been harvested prior to amputation of the breast mound had been kept on the backtable wrapped in normal saline and marked per side.  These were aggressively thinned with sharp scissors into the dermal layer to facilitate graft take.  Additional excess areola was then removed.  The graft  was secured with 5-0 fast absorbing gut interrupted and running cuticular suture.  Additional suturing was used to anchor the nipple in between for anchoring sutures essentially.  An 18-gauge needle was used to pie crust the graft.  Bolster dressing consisting of Xeroform sheet and antibiotic-soaked cotton balls was held in place with skin staples and 2-0 silk tie-overs.  ABDs were used for drain sponges.  Kerlix rolls were used to unfurl across the anterior chest for padding.  A double long 6-inch Ace wrap was used for circumferential wrapping the thorax for compression.  The On-Q pump was attached to the catheters.  This consisted of 550 mL of 0.2% ropivacaine to be delivered at 2 mL per hour per catheter for the next 5 days.        The patient was extubated.  Moe was removed.  The patient was transferred to a stretcher and taken to the recovery room in satisfactory condition, having tolerated the procedure without difficulty or complication.  Total weights in the OR were 1327 grams from the right breast and 1282 grams from the left.         PHILLIP MCCLURE MD             D: 2020   T: 2020   MT: CAITIE      Name:     BENSON ROMANO   MRN:      [id number]        Account:        [id number]   :      1980           Procedure Date: 2020      Document: [id number]

## 2020-01-09 NOTE — ANESTHESIA CARE TRANSFER NOTE
Patient: Jesus Hurd    Procedure(s):  Bilateral Simple Mastectomy, with Nipple Grafts. OnQ    Diagnosis: Gender dysphoria in adolescent and adult [F64.0]  Diagnosis Additional Information: No value filed.    Anesthesia Type:   General     Note:  Airway :Face Mask, Oral Airway and Nasopharyngeal Airway  Patient transferred to:PACU  Handoff Report: Identifed the Patient, Identified the Reponsible Provider, Reviewed the pertinent medical history, Discussed the surgical course, Reviewed Intra-OP anesthesia mangement and issues during anesthesia, Set expectations for post-procedure period and Allowed opportunity for questions and acknowledgement of understanding      Vitals: (Last set prior to Anesthesia Care Transfer)    CRNA VITALS  1/8/2020 1744 - 1/8/2020 1821      1/8/2020             Pulse:  104    SpO2:  96 %                Electronically Signed By: NISHA Clements CRNA  January 8, 2020  6:21 PM

## 2020-01-09 NOTE — BRIEF OP NOTE
[facility] Brief Operative Note    Pre-operative diagnosis: Gender dysphoria in adolescent and adult [F64.0]   Post-operative diagnosis Same as above   Procedure: Procedure(s):  Bilateral Simple Mastectomy, with Nipple Grafts. OnQ   Surgeon(s): Surgeon(s) and Role:     * Sheree Bear MD - Primary   Estimated blood loss: 100 mL    Specimens: ID Type Source Tests Collected by Time Destination   A :  Tissue Breast, Left SURGICAL PATHOLOGY EXAM Sheree Bear MD 1/8/2020  2:06 PM    B :  Tissue Breast, Right SURGICAL PATHOLOGY EXAM Sheree Bear MD 1/8/2020  2:18 PM       Findings: Bilateral simple mastectomy with nipple grafts. Nipples secured with bolster, chest wrapped with kerlix fluffs and ace wrap.   IVF: 2200ml  Urine: 400ml  Specimen: L breast 1282g, R breast 1327g

## 2020-01-09 NOTE — DISCHARGE INSTRUCTIONS
Caring for Your Raheel-Rollins Drain    You have been discharged with a Raheel-Rollins drainage tube. This tube drains fluid from your incision, helping prevent swelling and reducing the risk for infection. The tube is held in place by a few stitches. The drain will be removed when your doctor determines you no longer need it and when the amount of drainage decreases. The color and amount of fluid varies. Right after surgery, the fluid may be bright red and may become clearer over time.   Dressing:    Keep the skin around the tube dry.    If you have a dressing, change it every day.   o Wash your hands.  o Remove the old bandage. Do not use scissors-you may accidentally cut the tube.  o Check for any redness, swelling, drainage, or broken stitches. (Call your doctor with any of these findings).  o Wash your hands again.  o Wet a cotton swab (Q-tip) and clean around the incision and the tube site. Use normal saline solution (salt and water) or soap and water. Start at the tube site and move outward in a circular motion.   o Pat dry.  o Put a new bandage on the incision and tube site. Make the bandage large enough to cover the whole incision area.   o Tape the bandage in place.  o Throw out old materials and wash your hands.   Home Care:    Tape the tube to the skin below the bandage. Make sure to keep some slack in the tube to keep it from pulling out.     DO NOT sleep on the same side as the tube.    Secure the tube and bulb inside your clothing with a safety pin. This helps keep the tube from being pulled out.     Keep the bulb compressed at all times, except when you empty it.    Empty your drain at least twice a day. Empty it more often if the drain is full.   o Lift the opening of the drain.  o Drain the fluid into a measuring cup.  o Record the amount of fluid each time you empty. Share the information with your doctor at your follow-up visit.   o Squeeze the bulb with your hands until you hear air coming out of  the bulb.  o Close the opening.     Tape plastic wrap over the bandage and tube site when you shower.      Stripping  the tube helps keep blood clots from blocking the tube.                         ONLY DO THIS IF YOUR MD INSTRUCTED YOU TO DO SO!  o Hold the tubing where it leaves the skin with one hand. This keeps it from pulling on the skin.  o Pinch the tubing with the thumb and first finger of your other hand.   o Slowly and firmly pull your thumb and first finger down the tube (squeezing the tube between your fingers). Keep squeezing the tube as you run your fingers towards the bulb. If the pulling hurts or feels like it is coming out of the skin, STOP. Begin again more gently.  o Let go of the tubing with both hands. If the tube is still blocked, repeat these steps three or four times. Make sure that the bulb is compressed so it creates suction.  When to call your doctor:    New or increased pain around the tube    Redness, warmth, or swelling around the incision or tube    Drainage that is foul smelling    Vomiting    Fever over 101 F degrees    Fluid leaking around the tube    Incision seems not to be healing    Stitches become loose    The tube falls out    Drainage that changes from light pick to dark red    A sudden increase or decrease in the amount of drainage (over 30 ml).  Your drainage record:  Date Time Bulb 1: Amount of drainage (ml or cc) Bulb 2: Amount of drainage (ml or cc) Notes                                                                                            Rev. 4/2014  Caring for Your Incision    You ll need to care for your incision after surgery and certain medical procedures. To close an incision, your doctor used sutures (stitches), steri-strips, staples, or dermabond. Follow the tips on this sheet to help heal and prevent infection of your incision.   Types of Incision Closure:    Surgical Sutures (stitches) are placed by sewing the edges of an incision together with surgical  thread. Sutures are either absorbable or non-absorbable. Absorbable sutures break down in the body over time. Non-absorbable sutures need to be removed.     Steri-strips are made of adhesive (sticky) material to help hold the edges of an incision together. Steri-strips usually fall off by themselves in 7 to 10 days.     Surgical Staples are made or steel or titanium. They are often used to close shallow incisions. They are not used on certain body areas, such as the face and hands. This is because these areas have nerves that are close to the surface. Staples are usually removed within a week.     Dermabond (skin glue) is used to close a cut or small incision. The skin glue is less painful than stitches (sutures). In some cases, a lower layer of skin may be sutured before Dermabond is applied. The skin glue closes the cut/incision within a few minutes. It also provides a water-resistant covering. No bandage is required. Dermabond peels off on its own within 5 to 10 days.  Home Care for Your  Incision:    Keep the incision clean and dry. You should bathe only as directed by the doctor. It is okay to wash around the incision, but don t spray water directly on it.     Check the incision site daily for pain, redness, drainage, swelling, or separation of the incision edges.     If you have a dressing over the incision, change the dressing as directed by the doctor.    Make sure any clothing that touches the incision is loose fitting. This will prevent rubbing. If the incision is on the head, avoid wearing caps or other head coverings. These may rub against the incision.    Avoid rough play, contact sports, or physical activities. This can put you at risk of opening an incision.     As your incision heals, the skin may appear pink or red. It may also feel slightly bumpy or raised. This is called a healing ridge. Over time, the color should fade and the raised skin will become less noticeable.   Call the doctor right away  if you have any of the following:    Increased pain, redness, drainage, swelling or bleeding at the incision site    Numbness, coldness or tingling around the incision site    Fever of 101 F degrees or higher  Rev. 4/2014  ON-Q  C-bloc Continuous Nerve Block Discharge Instructions  The Nerve Block:       Your anesthesiologist performed a nerve block (a procedure that blocks pain to only a specific area) by inserting a small tube in your body.  This tube is connected to a pump that will help control your pain.    The pump is shaped like a balloon and is filled with medicine that causes numbness or loss of sensation to help control your pain around the incision or site of injury.  The pain pump DOES NOT contain controlled substances.      The medicine in the pump may alter your ability to feel changes in temperature or pressure. Your doctor will tell you if any special precautions apply to you.       The Pump      DO NOT SQUEEZE THE PUMP.     The pump delivers medicine at a very slow rate.    You will NOT see the medicine moving through the tubing.    As the medicine is delivered, the pump ball will slowly become smaller.    It may take a day or so before you notice a change in the size and look of the pump.    Depending on the size of your pump, it may take 2 - 5 days to give all the medicine.    The middle part of the pump may look like an apple core when empty.  Managing Your Pain  The Medicine and Infusion Rate:   0.2% Ropivacaine 4mL / hr            The continuous nerve block infusion may not block all of the pain from your surgery so it is important that you take the pain medicines prescribed by your surgeon if you need them.      If you continue to have difficulty with your pain control, please page or call the anesthesiologist.  Caring for Your Pump at Home    Wear the pump on the outside of clothing - away from your skin and cold therapy (ice packs).  The delivery rate is accurate only at room  temperature.    Make sure the white clamp on the tubing remains open (moves freely on the tubing).    Make sure there are no kinks in the tubing.    Do not tape or cover up the filter.    Protect the pump from sunlight and heat.    When sleeping:   o Do not place the pump underneath the bed covers where the pump may become too warm.  o Do not place the pump on the floor or hang the pump on a bed post as these situations may cause the tubing to get tangled and get pulled out.    Bathing/Showering:    o We recommend taking sponge baths until the pump is removed.  o It is important to not get the area where the tube enters your body wet.    It is normal for a small amount of fluid to leak from the hole in your body where the tube is inserted.  If this occurs, reinforce the bandage with another bandage.  The Infusion    Do not turn the infusion off unless your anesthesiologist has told you to do so.    Do not change the flow rate of the infusion unless your anesthesiologist told you to do so.  Changing the flow rate without your doctor s instruction may result in the wrong dose of medicine, which could cause serious injury.    If your anesthesiologist told you to change the rate of your infusion:  o Flip open the clear cover.  o Turn the white key on the select-a-flow dial to the instructed rate.  (The rate of the infusion should line up with the black arrow at the top of the controller.)    o Listen for the click when you move the dial.  Removing the Tubing    When the pump is empty or if you have been told to stop the infusion you can remove the tubing.  Follow these steps:  o Wash your hands.  o Clamp the tubing (squeeze the white clamp until you hear or feel a click).  o Remove the clear dressing that covers the tubing.  o Grasp the tubing close to the skin and gently pull.  If you meet resistance, stop pulling and page or call your anesthesiologist.  o DO NOT cut or forcefully remove the tubing.  o After removal,  check the end of the tubing for a dark tip.  If you do not see a dark tip, page or call your anesthesiologist.  o Apply firm pressure over the site until oozing stops.  Wash the area with soap and water, dry with a clean towel and then cover with a bandage.      The pump is not reusable. Dispose of it in the trash and wash your hands.   Troubleshooting    Tubing Comes Out From Skin:  If the tube accidentally comes out, check the end of the tube for a dark tip.    If you see a dark tip simply discard it and use the pain pills prescribed to you by your surgeon.    If you don t see a dark tip, page or call the anesthesiologist.    Tubing Disconnection:  If the tubing accidentally becomes disconnected from the pump, DO NOT reconnect the pump to the tubing.  It may have been contaminated with germs.  Close the tubing clamp and immediately page or call your anesthesiologist.    Fluid Leaking:  If enough fluid is leaking from the pump or the tubing outside your body to soak through the dressing, close the white clamp on the tubing and page or call your anesthesiologist.    Immediately report the following to your anesthesiologist:    Redness, warmth, swelling, or tenderness at the site the tubing was inserted    Increase in pain    Fever, chills, sweats    Bowel or bladder changes    Difficulty breathing    Dizziness, lightheadedness    Blurred vision    Ringing or buzzing in your ears    Metal taste in your mouth    Numbness and/or tingling around your mouth, fingers or toes    Drowsiness    Confusion    Trouble removing the tubing    Dark tip is not present when tubing is removed         Notifying your Anesthesiologist  o Page:  Dial [phone], then enter [phone].  You will be prompted to enter your phone number and then the # sign.  The anesthesiologist will call you back.  o Call:  Dial [phone].  Ask to speak to the anesthesiologist on call for the [facility].     Caring for Your  Incision    You ll need to care for your incision after surgery and certain medical procedures. To close an incision, your doctor used sutures (stitches), steri-strips, staples, or dermabond. Follow the tips on this sheet to help heal and prevent infection of your incision.   Types of Incision Closure:    Surgical Sutures (stitches) are placed by sewing the edges of an incision together with surgical thread. Sutures are either absorbable or non-absorbable. Absorbable sutures break down in the body over time. Non-absorbable sutures need to be removed.     Steri-strips are made of adhesive (sticky) material to help hold the edges of an incision together. Steri-strips usually fall off by themselves in 7 to 10 days.     Surgical Staples are made or steel or titanium. They are often used to close shallow incisions. They are not used on certain body areas, such as the face and hands. This is because these areas have nerves that are close to the surface. Staples are usually removed within a week.     Dermabond (skin glue) is used to close a cut or small incision. The skin glue is less painful than stitches (sutures). In some cases, a lower layer of skin may be sutured before Dermabond is applied. The skin glue closes the cut/incision within a few minutes. It also provides a water-resistant covering. No bandage is required. Dermabond peels off on its own within 5 to 10 days.  Home Care for Your  Incision:    Keep the incision clean and dry. You should bathe only as directed by the doctor. It is okay to wash around the incision, but don t spray water directly on it.     Check the incision site daily for pain, redness, drainage, swelling, or separation of the incision edges.     If you have a dressing over the incision, change the dressing as directed by the doctor.    Make sure any clothing that touches the incision is loose fitting. This will prevent rubbing. If the incision is on the head, avoid wearing caps or other head  coverings. These may rub against the incision.    Avoid rough play, contact sports, or physical activities. This can put you at risk of opening an incision.     As your incision heals, the skin may appear pink or red. It may also feel slightly bumpy or raised. This is called a healing ridge. Over time, the color should fade and the raised skin will become less noticeable.   Call the doctor right away if you have any of the following:    Increased pain, redness, drainage, swelling or bleeding at the incision site    Numbness, coldness or tingling around the incision site    Fever of 101 F degrees or higher  Same-Day Surgery   Adult Discharge Orders & Instructions     For 24 hours after surgery:  1. Get plenty of rest.  A responsible adult must stay with you for at least 24 hours after you leave the hospital.   2. Pain medication can slow your reflexes. Do not drive or use heavy equipment.  If you have weakness or tingling, don't drive or use heavy equipment until this feeling goes away.  3. Mixing alcohol and pain medication can cause dizziness and slow your breathing. It can even be fatal. Do not drink alcohol while taking pain medication.  4. Avoid strenuous or risky activities.  Ask for help when climbing stairs.   5. You may feel lightheaded.  If so, sit for a few minutes before standing.  Have someone help you get up.   6. If you have nausea (feel sick to your stomach), drink only clear liquids such as apple juice, ginger ale, broth or 7-Up.  Rest may also help.  Be sure to drink enough fluids.  Move to a regular diet as you feel able. Take pain medications with a small amount of solid food, such as toast or crackers, to avoid nausea.   7. A slight fever is normal. Call the doctor if your fever is over 100 F (37.7 C) (taken under the tongue) or lasts longer than 24 hours.  8. You may have a dry mouth, muscle aches, trouble sleeping or a sore throat.  These symptoms should go away after 24 hours.  9. Do not make  important or legal decisions.   Pain Management:      1. Take pain medication (if prescribed) for pain as directed by your physician.        2. WARNING: If the pain medication you have been prescribed contains Tylenol (acetaminophen), DO NOT take additional doses of Tylenol (acetaminophen).     Call your doctor for any of the followin.  Signs of infection (fever, growing tenderness at the surgery site, severe pain, a large amount of drainage or bleeding, foul-smelling drainage, redness, swelling).    2.  It has been over 8 to 10 hours since surgery and you are still not able to urinate (pee).    3.  Headache for over 24 hours.    4.  Numbness, tingling or weakness the day after surgery (if you had spinal anesthesia).  To contact a doctor, call _____________________________________ or:      [phone] and ask for the Resident On Call for: __________________________________________ (answered 24 hours a day)      Emergency Department:  [facility]: [phone]  [facility]: [phone]               Rev. 10/2014     If you use hormonal birth control (such as the pill, patch, ring or implants):  You will need a second form of birth control for 7 days (condoms, a diaphragm or contraceptive foam).  While in the surgery center, you received a medicine called Sugammadex.  Hormonal birth control (such as the pill, patch, ring or implants) may not work as well for a week after taking this medicine.

## 2020-01-10 NOTE — PROGRESS NOTES
Spoke with pt on phone regarding slight metallic taste in mouth. On-Q pump in place. Denies any other symptoms such as ringing in ear, perioral tinging/numbness, or any other systemic symptoms. Otherwise feels asymptomatic. Pain moderately controlled.    Informed patient to continue monitoring symptoms. If symptoms do not improve or developing any other symptoms of local anesthetic toxicity as above then clamp or remove On-Q catheters immediately and report to ED.    Benito Torre MD   Surgery PGY-4

## 2020-01-13 LAB — COPATH REPORT: NORMAL

## 2020-01-14 ENCOUNTER — OFFICE VISIT (OUTPATIENT)
Dept: PLASTIC SURGERY | Facility: CLINIC | Age: 40
End: 2020-01-14
Payer: COMMERCIAL

## 2020-01-14 DIAGNOSIS — Z90.13 S/P BILATERAL MASTECTOMY: Primary | ICD-10-CM

## 2020-01-14 ASSESSMENT — PAIN SCALES - GENERAL: PAINLEVEL: MILD PAIN (3)

## 2020-01-14 NOTE — NURSING NOTE
Chief Complaint   Patient presents with     Surgical Followup     Pt here for 1 week post op       There were no vitals filed for this visit.    There is no height or weight on file to calculate BMI.      ELVIN Pabon NREMT                    No vitals taken per provider

## 2020-01-14 NOTE — LETTER
1/14/2020       RE: Jesus Hurd  [address] [place] [address]     Dear Colleague,    Thank you for referring your patient, Jesus Hurd, to the [facility] SURGERY at [facility]. Please see a copy of my visit note below.    PLASTICS POST-OP   This is a 39 year old trans male with a history of gender dysphoria who presents 6 days post-op after a bilateral simple mastectomy with nipple graft reconstruction on 1/8/2020. His wife and family are here.     Jesus reports he slept for the first several days post op. He discontinued his oxycodone yesterday, and is now managing pain with 1000mg Tylenol. He does not need any medication refills today.     PE: Chest: Great upper chest contour and symmetry. Nipple grafts 100% take.  Dermabond intact. Minimal/moderate standing cutaneous deformities. Right nipple graft is slightly more lateral than the left. Minimal bruising. Left medial flap slightly thicker.  Photo taken with verbal consent.     A&P: 39 year old trans male with a history of gender dysphoria who presents 6 days post-op after a bilateral simple mastectomy with nipple graft reconstruction on 1/8/2020. As right MARINA drain output was within normal limits, right MARINA drain was removed without difficulty. He will need to return to clinic in 2-3 days to have left drain removed. Also removed OnQ catheters. We discussed when to remove the Dermabond on the incision. He was given supplies for nipple graft dressings while they mature during the next week. I also instructed Jesus to wear his ACE wrap for an additional week.     I reviewed with the patient how long to maintain limited activities. We discussed scar reducing techniques, such as Mederma, silicone strips, vitamin E oil, emu oil, massage and when he may begin using these. Problems to look out for during the recovery period were discussed, including: spitting sutures, incision  dehiscence, hematoma/seroma, thick scarring, fluid accumulation under skin flap, delayed nipple graft healing.     He will follow up in 2-3 days to have left MARINA drain removed by nursing staff, and will follow up with me at 4-6 weeks post-op. Causes for returning sooner were discussed. 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    This note was prepared on behalf of Sheree Bear MD, by Renetta Balderrama, a trained medical scribe, based on my observations and the provider's statements to me.     Again, thank you for allowing me to participate in the care of your patient.      Sincerely,    Sheree Bear MD

## 2020-01-14 NOTE — PROGRESS NOTES
PLASTICS POST-OP   This is a 39 year old trans male with a history of gender dysphoria who presents 6 days post-op after a bilateral simple mastectomy with nipple graft reconstruction on 1/8/2020. His wife and family are here.     Jesus reports he slept for the first several days post op. He discontinued his oxycodone yesterday, and is now managing pain with 1000mg Tylenol. He does not need any medication refills today.     PE: Chest: Great upper chest contour and symmetry. Nipple grafts 100% take.  Dermabond intact. Minimal/moderate standing cutaneous deformities. Right nipple graft is slightly more lateral than the left. Minimal bruising. Left medial flap slightly thicker.  Photo taken with verbal consent.     A&P: 39 year old trans male with a history of gender dysphoria who presents 6 days post-op after a bilateral simple mastectomy with nipple graft reconstruction on 1/8/2020. As right MARINA drain output was within normal limits, right MARINA drain was removed without difficulty. He will need to return to clinic in 2-3 days to have left drain removed. Also removed OnQ catheters. We discussed when to remove the Dermabond on the incision. He was given supplies for nipple graft dressings while they mature during the next week. I also instructed Jesus to wear his ACE wrap for an additional week.     I reviewed with the patient how long to maintain limited activities. We discussed scar reducing techniques, such as Mederma, silicone strips, vitamin E oil, emu oil, massage and when he may begin using these. Problems to look out for during the recovery period were discussed, including: spitting sutures, incision dehiscence, hematoma/seroma, thick scarring, fluid accumulation under skin flap, delayed nipple graft healing.     He will follow up in 2-3 days to have left MARINA drain removed by nursing staff, and will follow up with me at 4-6 weeks post-op. Causes for returning sooner were discussed. 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    This note was prepared on behalf of Sheree Bear MD, by Renetta Balderrama, a trained medical scribe, based on my observations and the provider's statements to me.

## 2020-01-16 ENCOUNTER — ALLIED HEALTH/NURSE VISIT (OUTPATIENT)
Dept: SURGERY | Facility: CLINIC | Age: 40
End: 2020-01-16
Payer: COMMERCIAL

## 2020-01-16 DIAGNOSIS — Z98.890 POSTOPERATIVE STATE: Primary | ICD-10-CM

## 2020-01-16 NOTE — PATIENT INSTRUCTIONS
Care of Nipples and Chest Incisions    Change nipple dressings daily.  No direct shower spray on nipple grafts for 4 weeks.     Cut vaseline gauze to nipple size and place over nipple.  Place folded white gauze over vaseline gauze and cover with plastic dressing.  Do dressing changes for 1 more week.  If you continue to have drainage, continue the dressings until drainage stops.     Keep compression on for 1 more weeks. No lifting greater than 5 lbs for 4 weeks. Begin moisturizing your incisions with any non-scented, non-glittered lotion. You may peel off incisional tape at 3 weeks after your surgery date. Then apply silicone gel sheets to incisions.  These can be purchased on [facility] and at [employer] and [employer].     At one month post op, baseline shoulder motion should return. You may start to exercise lightly at this time, gradually moving up to arm movement. Full shoulder motion should return by 8 weeks.           When to call:  Sudden increase of swelling or pain on one side  Uncontrolled pain despite pain medication  Worsening of chest swelling   Separation of nipple from chest skin  Redness and warmth in chest area  Fever > 101     Contact the RN between 8-3:30 Mon-Fri with questions or concerns through my chart message via your doctor's name (most efficient) or call at [phone]. Your call or message will be returned same day.     For urgent medical issues that cannot wait, call [phone] Mon-Fri 8:-4:30.     After hours, weekends or holidays, call [phone] and ask to speak to the on [facility] plastic surgeon fellow.

## 2020-01-16 NOTE — PROGRESS NOTES
Pt. comes into clinic today at the request of Dr. Sheree Bear.    This service provided today was under the supervising provider of the LUZ ELENA Iyer, who was available if needed.    Reason for visit: Post op visit.  Pt returns 8 days after bilateral mastectomy with free nipple grafts.    Drains: Left MARINA drain < 15 ml for several days. Removed.  Instructions:  See AVS  Return to clinic:  See Dr. Bear as scheduled    Nancy Begum RN  Care Coordinator

## 2020-02-06 DIAGNOSIS — F64.0 GENDER DYSPHORIA IN ADULT: ICD-10-CM

## 2020-02-06 NOTE — TELEPHONE ENCOUNTER
"Request for medication refill: 18GX1 NEEDLES    Providers if patient needs an appointment and you are willing to give a one month supply please refill for one month and  send a letter/MyChart using \".SMILLIMITEDREFILL\" .smillimited and route chart to \"P SMI \" (Giving one month refill in non controlled medications is strongly recommended before denial)    If refill has been denied, meaning absolutely no refills without visit, please complete the smart phrase \".smirxrefuse\" and route it to the \"P SMI MED REFILLS\"  pool to inform the patient and the pharmacy.    Kristi Joy, CMA        "

## 2020-02-25 ENCOUNTER — OFFICE VISIT (OUTPATIENT)
Dept: PLASTIC SURGERY | Facility: CLINIC | Age: 40
End: 2020-02-25
Payer: COMMERCIAL

## 2020-02-25 DIAGNOSIS — Z90.13 S/P BILATERAL MASTECTOMY: Primary | ICD-10-CM

## 2020-02-25 ASSESSMENT — PAIN SCALES - GENERAL: PAINLEVEL: SEVERE PAIN (6)
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
## 2020-02-25 NOTE — NURSING NOTE
Chief Complaint   Patient presents with     Surgical Followup     Pt here for 6 week post op       There were no vitals filed for this visit.    There is no height or weight on file to calculate BMI.      ELVIN Pabon NREMT                    No vitals taken per provider
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
## 2020-03-13 DIAGNOSIS — F64.0 GENDER DYSPHORIA IN ADULT: ICD-10-CM

## 2020-03-13 NOTE — TELEPHONE ENCOUNTER

## 2020-03-17 ENCOUNTER — DOCUMENTATION ONLY (OUTPATIENT)
Dept: FAMILY MEDICINE | Facility: CLINIC | Age: 40
End: 2020-03-17

## 2020-03-17 NOTE — PROGRESS NOTES
"When opening a documentation only encounter, be sure to enter in \"Chief Complaint\" Forms and in \" Comments\" Title of form, description if needed.    Jesus is a 39 year old  adult  Form received via: Fax  Form now resides in: Provider Thierry Bond CMA    Form has been completed by provider.     Form sent out via: Fax to [employer] at Fax Number: [phone]  Patient informed: n/a  Output date: March 20, 2020    Mary Christiansen CMA      **Please close the encounter**                  "

## 2020-03-22 ENCOUNTER — MYC REFILL (OUTPATIENT)
Dept: FAMILY MEDICINE | Facility: CLINIC | Age: 40
End: 2020-03-22

## 2020-03-22 DIAGNOSIS — F64.0 GENDER DYSPHORIA IN ADULT: ICD-10-CM

## 2020-03-30 ENCOUNTER — MYC MEDICAL ADVICE (OUTPATIENT)
Dept: FAMILY MEDICINE | Facility: CLINIC | Age: 40
End: 2020-03-30

## 2020-03-30 DIAGNOSIS — F64.0 GENDER DYSPHORIA IN ADULT: ICD-10-CM

## 2020-06-08 DIAGNOSIS — F64.0 GENDER DYSPHORIA IN ADULT: ICD-10-CM

## 2020-06-08 RX ORDER — TESTOSTERONE CYPIONATE 200 MG/ML
INJECTION, SOLUTION INTRAMUSCULAR
Qty: 8 ML | Refills: 0 | Status: SHIPPED | OUTPATIENT
Start: 2020-06-08 | End: 2020-06-22

## 2020-06-08 NOTE — TELEPHONE ENCOUNTER

## 2020-06-08 NOTE — TELEPHONE ENCOUNTER
, please schedule pt for HRT follow up and labs    RN Please forward to preceptor to electronically send T Rx.     Emerson Machado, DO

## 2020-06-22 ENCOUNTER — VIRTUAL VISIT (OUTPATIENT)
Dept: FAMILY MEDICINE | Facility: CLINIC | Age: 40
End: 2020-06-22
Payer: COMMERCIAL

## 2020-06-22 DIAGNOSIS — E78.2 MIXED HYPERLIPIDEMIA: ICD-10-CM

## 2020-06-22 DIAGNOSIS — Z90.13 S/P BILATERAL MASTECTOMY: ICD-10-CM

## 2020-06-22 DIAGNOSIS — F64.0 GENDER DYSPHORIA IN ADULT: Primary | ICD-10-CM

## 2020-06-22 DIAGNOSIS — F15.11 METHAMPHETAMINE ABUSE IN REMISSION (H): ICD-10-CM

## 2020-06-22 RX ORDER — TESTOSTERONE CYPIONATE 200 MG/ML
60 INJECTION, SOLUTION INTRAMUSCULAR WEEKLY
Qty: 4 ML | Refills: 5 | Status: SHIPPED | OUTPATIENT
Start: 2020-06-22 | End: 2020-10-01

## 2020-06-22 ASSESSMENT — ANXIETY QUESTIONNAIRES
7. FEELING AFRAID AS IF SOMETHING AWFUL MIGHT HAPPEN: NOT AT ALL
GAD7 TOTAL SCORE: 4
6. BECOMING EASILY ANNOYED OR IRRITABLE: MORE THAN HALF THE DAYS
3. WORRYING TOO MUCH ABOUT DIFFERENT THINGS: SEVERAL DAYS
2. NOT BEING ABLE TO STOP OR CONTROL WORRYING: NOT AT ALL
5. BEING SO RESTLESS THAT IT IS HARD TO SIT STILL: NOT AT ALL
IF YOU CHECKED OFF ANY PROBLEMS ON THIS QUESTIONNAIRE, HOW DIFFICULT HAVE THESE PROBLEMS MADE IT FOR YOU TO DO YOUR WORK, TAKE CARE OF THINGS AT HOME, OR GET ALONG WITH OTHER PEOPLE: SOMEWHAT DIFFICULT
1. FEELING NERVOUS, ANXIOUS, OR ON EDGE: NOT AT ALL

## 2020-06-22 ASSESSMENT — PATIENT HEALTH QUESTIONNAIRE - PHQ9
SUM OF ALL RESPONSES TO PHQ QUESTIONS 1-9: 12
5. POOR APPETITE OR OVEREATING: SEVERAL DAYS

## 2020-06-22 NOTE — PROGRESS NOTES
Preceptor Attestation:   Patient seen, evaluated and discussed with the resident. I have verified the content of the note, which accurately reflects my assessment of the patient and the plan of care.   Supervising Physician:  Lincoln Galeana MD

## 2020-06-22 NOTE — PROGRESS NOTES
"Family Medicine Video Visit Note  Jesus is being evaluated via a billable video visit.           Video Visit Consent     Patient was verbally read the following and verbal consent was obtained.  \"Video visits are billed at different rates depending on your insurance coverage. During this emergency period, for some insurers they may be billed the same as an in-person visit.  Please reach out to your insurance provider with any questions.  If during the course of the call the physician/provider feels a video visit is not appropriate, you will not be charged for this service.\"     (Name person giving consent:  Patient   Date verbal consent given:  6/22/2020  Time verbal consent given:  1:27 PM)    Patient would like the video invitation sent by: Text to cell phone: [phone]    Chief Complaint   Patient presents with     HRT     FOLLOW UP HRT          HPI     Video Start Time: 1:42 PM    Jesus is a 39 year old individual that uses pronouns He/Him/His/Himself that presents today for follow up of:  masculinizing hormone therapy.     Gender identity: male  S/p top surgery. Doing very good. Post op course uncomplicated.     Any special concerns today?  no current concerns  On hormones?  YES +++ Shot day of the week? Saturday      Due for labs?  Yes      +++ Refills of meds needed?  Yes    Gender affirmation is being sought in these other ways:   Vee is therapist. Sees weekly.     ++++      Concern: Replase of Meth   Description of the problem : Jesus notes that he did have a relapse of meth in early January.  He notes difficulty in relationship with his wife, Jesus, and they  in December.  In an attempt to get back together, he noted he did have a relapse on of methamphetamine in January, with his last use being January 8, 2020.  He notes that CPS became involved, and younger kids were placed in protective custody.  At this point now, older children are able to visit.  Has also had a lot of mental distress " "about Elva cheating on him.  He notes that he is now sober and has a good sober support network and is back in meetings at least weekly.  He notes that he has now moved out of the house and is living with roommates who are sober since mid March.  He is concerned that while he is still using.  He notes that he had one positive urine drug screen after use in January and has had none since.       Current Outpatient Medications   Medication Sig Dispense Refill     Acetaminophen (TYLENOL PO) Take 325 mg by mouth every 4 hours as needed        ARIPiprazole (ABILIFY) 20 MG tablet TK 1 T PO QHS  1     atorvastatin (LIPITOR) 20 MG tablet Take 1 tablet (20 mg) by mouth daily 90 tablet 3     buPROPion (WELLBUTRIN XL) 150 MG 24 hr tablet Take 150 mg by mouth daily Take with 300mg tab for dose of 450 mg daily  4     buPROPion (WELLBUTRIN XL) 300 MG 24 hr tablet Take 300 mg by mouth daily  1     escitalopram (LEXAPRO) 5 MG tablet TK 1 T PO QD  1     gabapentin (NEURONTIN) 300 MG capsule TK ONE C PO  QHS  1     needle, disp, 18G X 1\" MISC Use to draw up hormones once weekly 25 each 3     Needle, Disp, 25G X 1-1/2\" MISC Use once weekly for administering hormone IM 25 each 3     syringe, disposable, 1 ML MISC Use once weekly to draw up hormones 25 each 3     testosterone cypionate (DEPOTESTOSTERONE) 200 MG/ML injection Inject 0.3 mLs (60 mg) into the muscle once a week 4 mL 5     Allergies   Allergen Reactions     Nickel Itching     Seroquel [Quetiapine] Other (See Comments)     Restless leg syndrom       Past Surgical History:   Procedure Laterality Date     BREAST SURGERY       ENT SURGERY  10/24/2017    Plastic Nose Repair     TRANSGENDER MASTECTOMY Bilateral 1/8/2020    Procedure: Bilateral Simple Mastectomy, with Nipple Grafts. OnQ;  Surgeon: Sheree Bear MD;  Location: [facility] OR       Patient Active Problem List   Diagnosis     Bipolar 1 disorder (H)     Anxiety     PTSD (post-traumatic stress disorder)     H/O: " "attempted suicide     Methamphetamine abuse in remission (H)     Chondromalacia patellae of right knee     Gender dysphoria in adolescent and adult     BMI 37.0-37.9, adult     Tobacco abuse, in remission     S/P bilateral mastectomy       Current Outpatient Medications   Medication Sig Dispense Refill     Acetaminophen (TYLENOL PO) Take 325 mg by mouth every 4 hours as needed        ARIPiprazole (ABILIFY) 20 MG tablet TK 1 T PO QHS  1     atorvastatin (LIPITOR) 20 MG tablet Take 1 tablet (20 mg) by mouth daily 90 tablet 3     buPROPion (WELLBUTRIN XL) 150 MG 24 hr tablet Take 150 mg by mouth daily Take with 300mg tab for dose of 450 mg daily  4     buPROPion (WELLBUTRIN XL) 300 MG 24 hr tablet Take 300 mg by mouth daily  1     escitalopram (LEXAPRO) 5 MG tablet TK 1 T PO QD  1     gabapentin (NEURONTIN) 300 MG capsule TK ONE C PO  QHS  1     needle, disp, 18G X 1\" MISC Use to draw up hormones once weekly 25 each 3     Needle, Disp, 25G X 1-1/2\" MISC Use once weekly for administering hormone IM 25 each 3     syringe, disposable, 1 ML MISC Use once weekly to draw up hormones 25 each 3     testosterone cypionate (DEPOTESTOSTERONE) 200 MG/ML injection Inject 0.3 mLs (60 mg) into the muscle once a week 4 mL 5       History   Smoking Status     Former Smoker     Packs/day: 0.50     Years: 21.00   Smokeless Tobacco     Never Used     Comment: using nicotine inhaler which helps          Allergies   Allergen Reactions     Nickel Itching     Seroquel [Quetiapine] Other (See Comments)     Restless leg syndrom       Problem, Medication and Allergy Lists were reviewed and are current..           Review of Systems:     Constitutional, HEENT, cardiovascular, pulmonary, gi and gu systems are negative, except as otherwise noted.         Physical Exam:     There were no vitals taken for this visit.  Estimated body mass index is 37.09 kg/m  as calculated from the following:    Height as of 1/8/20: 1.651 m (5' 5\").    Weight as of " 1/8/20: 101.1 kg (222 lb 14.2 oz).    General: Alert and oriented, in no acute distress.  Male in appearance and dress  Skin: Warm and dry, no abnormalities noted.  Eyes: Extra-ocular muscles intact, pupils equal and reactive.  ENT: Speech intact, nasal passages open, no hearing impairment noted.  CV: No cyanosis or pallor, warm and well perfused.  Respiratory: No respiratory distress, no accessory muscle use.  Neuro: Gait and station normal, comprehension intact. Gross and fine motor skills intact.   Psychiatric: Mood and affect appear normal.   Extremities: Warm, able to move all four extremities at will.          Assessment and Plan   Jesus was seen today for hrt.    Diagnoses and all orders for this visit:    Gender dysphoria in adult  S/P bilateral mastectomy  -     Cancel: Testosterone total  -     Cancel: Lipid Bruce ([facility])  -     Cancel: CBC with Plt ([facility])  -     Cancel: Comprehensive Metabolic Panel ([facility])  -     testosterone cypionate (DEPOTESTOSTERONE) 200 MG/ML injection; Inject 0.3 mLs (60 mg) into the muscle once a week  -     CBC with platelets differential; Future  -     Comprehensive metabolic panel; Future  -     Lipid panel reflex to direct LDL Fasting; Future  -     Testosterone total; Future  Jesus is a 39-year-old person who identifies male and is on testosterone replacement therapy.  Is now status post bilateral double vasectomy in January 2020.  Is healing well from top surgery and has no concerns.  Due for laboratory check and refill of testosterone today. Contraception: None needed, no partners with sperm. Counselled patient about controlled substances: yes, not sharing medication, not sharing supplies.    Methamphetamine abuse in remission (H)  -     Cancel: Hepatitis C antibody  -     Cancel: HIV Antigen Antibody Combo  -     Hepatitis C antibody; Future  -     HIV Antigen Antibody Combo; Future    Jesus did have a relapse of methamphetamine in January 2020 and is very  forthcoming about this.  Is now in remission, following sober networks, meetings, meeting with arms worker as well as  and  as well as child protective services in order to regain custody of children.  Continue to sober network and sobriety interventions.  Will add on HIV and hepatitis C considering use in the last 6 months.      After Visit Information:  Patient chose to view AVS via [employer]      Return in about 3 months (around 9/22/2020).    Results by [employer]  Questions were elicited and answered.     Emerson Machado DO    Video-Visit Details    Type of service:  Video Visit  Video End Time (time video stopped): 2:02 PM  Originating Location (pt. Location): Home  Distant Location (provider location):  [facility]   Mode of Communication:  Video Conference via [employer]    Emerson Machado DO  I precepted today with Brett Galeana MD.    Addendum.  6/24/2020  10:43 AM  Lipids resulted and elevated. Will increase atorvastatin from 20mg every day to 40mg every day. Recheck labs in 2 months.    Emerson Machado DO

## 2020-06-23 DIAGNOSIS — F64.0 GENDER DYSPHORIA IN ADULT: ICD-10-CM

## 2020-06-23 PROBLEM — Z90.13 S/P BILATERAL MASTECTOMY: Status: ACTIVE | Noted: 2020-06-23

## 2020-06-23 LAB
ALBUMIN SERPL-MCNC: 3.4 G/DL (ref 3.4–5)
ALP SERPL-CCNC: 100 U/L (ref 40–150)
ALT SERPL W P-5'-P-CCNC: 28 U/L (ref 0–50)
ANION GAP SERPL CALCULATED.3IONS-SCNC: 6 MMOL/L (ref 3–14)
AST SERPL W P-5'-P-CCNC: 24 U/L (ref 0–45)
BASOPHILS # BLD AUTO: 0 10E9/L (ref 0–0.2)
BASOPHILS NFR BLD AUTO: 0.4 %
BILIRUB SERPL-MCNC: 0.2 MG/DL (ref 0.2–1.3)
BUN SERPL-MCNC: 7 MG/DL (ref 7–30)
CALCIUM SERPL-MCNC: 8.7 MG/DL (ref 8.5–10.1)
CHLORIDE SERPL-SCNC: 107 MMOL/L (ref 94–109)
CHOLEST SERPL-MCNC: 171 MG/DL
CO2 SERPL-SCNC: 23 MMOL/L (ref 20–32)
CREAT SERPL-MCNC: 0.91 MG/DL (ref 0.52–1.04)
DIFFERENTIAL METHOD BLD: ABNORMAL
EOSINOPHIL # BLD AUTO: 0.1 10E9/L (ref 0–0.7)
EOSINOPHIL NFR BLD AUTO: 1.3 %
ERYTHROCYTE [DISTWIDTH] IN BLOOD BY AUTOMATED COUNT: 12.3 % (ref 10–15)
GFR SERPL CREATININE-BSD FRML MDRD: 80 ML/MIN/{1.73_M2}
GLUCOSE SERPL-MCNC: 117 MG/DL (ref 70–99)
HCT VFR BLD AUTO: 47.4 % (ref 35–47)
HDLC SERPL-MCNC: 28 MG/DL
HGB BLD-MCNC: 15.3 G/DL (ref 11.7–15.7)
IMM GRANULOCYTES # BLD: 0 10E9/L (ref 0–0.4)
IMM GRANULOCYTES NFR BLD: 0.3 %
LDLC SERPL CALC-MCNC: 100 MG/DL
LYMPHOCYTES # BLD AUTO: 3 10E9/L (ref 0.8–5.3)
LYMPHOCYTES NFR BLD AUTO: 30 %
MCH RBC QN AUTO: 30 PG (ref 26.5–33)
MCHC RBC AUTO-ENTMCNC: 32.3 G/DL (ref 31.5–36.5)
MCV RBC AUTO: 93 FL (ref 78–100)
MONOCYTES # BLD AUTO: 0.8 10E9/L (ref 0–1.3)
MONOCYTES NFR BLD AUTO: 8.3 %
NEUTROPHILS # BLD AUTO: 6.1 10E9/L (ref 1.6–8.3)
NEUTROPHILS NFR BLD AUTO: 59.7 %
NONHDLC SERPL-MCNC: 143 MG/DL
NRBC # BLD AUTO: 0 10*3/UL
NRBC BLD AUTO-RTO: 0 /100
PLATELET # BLD AUTO: 347 10E9/L (ref 150–450)
POTASSIUM SERPL-SCNC: 3.8 MMOL/L (ref 3.4–5.3)
PROT SERPL-MCNC: 7.1 G/DL (ref 6.8–8.8)
RBC # BLD AUTO: 5.1 10E12/L (ref 3.8–5.2)
SODIUM SERPL-SCNC: 137 MMOL/L (ref 133–144)
TRIGL SERPL-MCNC: 215 MG/DL
WBC # BLD AUTO: 10.1 10E9/L (ref 4–11)

## 2020-06-23 ASSESSMENT — ANXIETY QUESTIONNAIRES: GAD7 TOTAL SCORE: 4

## 2020-06-24 LAB
HCV AB SERPL QL IA: NONREACTIVE
HIV 1+2 AB+HIV1 P24 AG SERPL QL IA: NONREACTIVE

## 2020-06-24 RX ORDER — ATORVASTATIN CALCIUM 40 MG/1
40 TABLET, FILM COATED ORAL DAILY
Qty: 30 TABLET | Refills: 1 | Status: SHIPPED | OUTPATIENT
Start: 2020-06-24 | End: 2020-10-01

## 2020-06-26 LAB — TESTOST SERPL-MCNC: 549 NG/DL (ref 8–60)

## 2020-07-03 ENCOUNTER — MYC MEDICAL ADVICE (OUTPATIENT)
Dept: FAMILY MEDICINE | Facility: CLINIC | Age: 40
End: 2020-07-03

## 2020-07-06 DIAGNOSIS — E55.9 VITAMIN D DEFICIENCY: Primary | ICD-10-CM

## 2020-07-06 NOTE — TELEPHONE ENCOUNTER
"Request for medication refill: Patient is requesting to have his vitamin D refilled,but I am unable to locate medication in the current medication list and the old medication list.  Please advise,  Thank you Lacey Vinson MA    Providers if patient needs an appointment and you are willing to give a one month supply please refill for one month and  send a letter/MyChart using \".SMILLIMITEDREFILL\" .smillimited and route chart to \"P SMI \" (Giving one month refill in non controlled medications is strongly recommended before denial)    If refill has been denied, meaning absolutely no refills without visit, please complete the smart phrase \".smirxrefuse\" and route it to the \"P SMI MED REFILLS\"  pool to inform the patient and the pharmacy.    Lacey Vinson MA        "

## 2020-07-07 DIAGNOSIS — E55.9 VITAMIN D DEFICIENCY: Primary | ICD-10-CM

## 2020-07-07 RX ORDER — CHOLECALCIFEROL (VITAMIN D3) 50 MCG
1 TABLET ORAL DAILY
Qty: 90 TABLET | Refills: 1 | Status: SHIPPED | OUTPATIENT
Start: 2020-07-07 | End: 2021-11-03

## 2020-07-07 RX ORDER — ERGOCALCIFEROL 1.25 MG/1
50000 CAPSULE, LIQUID FILLED ORAL WEEKLY
Qty: 4 CAPSULE | Refills: 0 | Status: SHIPPED | OUTPATIENT
Start: 2020-07-07 | End: 2020-09-21

## 2020-07-29 ENCOUNTER — OFFICE VISIT - HEALTHEAST (OUTPATIENT)
Dept: ADDICTION MEDICINE | Facility: CLINIC | Age: 40
End: 2020-07-29

## 2020-07-29 DIAGNOSIS — F15.20 METHAMPHETAMINE USE DISORDER, MODERATE (H): ICD-10-CM

## 2020-08-03 ENCOUNTER — COMMUNICATION - HEALTHEAST (OUTPATIENT)
Dept: ADDICTION MEDICINE | Facility: CLINIC | Age: 40
End: 2020-08-03

## 2020-08-11 ENCOUNTER — AMBULATORY - HEALTHEAST (OUTPATIENT)
Dept: ADDICTION MEDICINE | Facility: CLINIC | Age: 40
End: 2020-08-11

## 2020-08-11 ENCOUNTER — OFFICE VISIT - HEALTHEAST (OUTPATIENT)
Dept: ADDICTION MEDICINE | Facility: CLINIC | Age: 40
End: 2020-08-11

## 2020-08-11 DIAGNOSIS — F15.20 METHAMPHETAMINE USE DISORDER, MODERATE (H): ICD-10-CM

## 2020-08-13 ENCOUNTER — OFFICE VISIT - HEALTHEAST (OUTPATIENT)
Dept: ADDICTION MEDICINE | Facility: HOSPITAL | Age: 40
End: 2020-08-13

## 2020-08-13 DIAGNOSIS — F15.20 METHAMPHETAMINE USE DISORDER, MODERATE (H): ICD-10-CM

## 2020-08-14 ENCOUNTER — AMBULATORY - HEALTHEAST (OUTPATIENT)
Dept: ADDICTION MEDICINE | Facility: HOSPITAL | Age: 40
End: 2020-08-14

## 2020-08-19 ENCOUNTER — OFFICE VISIT - HEALTHEAST (OUTPATIENT)
Dept: ADDICTION MEDICINE | Facility: HOSPITAL | Age: 40
End: 2020-08-19

## 2020-08-19 DIAGNOSIS — F15.20 METHAMPHETAMINE USE DISORDER, MODERATE (H): ICD-10-CM

## 2020-08-20 ENCOUNTER — OFFICE VISIT - HEALTHEAST (OUTPATIENT)
Dept: ADDICTION MEDICINE | Facility: HOSPITAL | Age: 40
End: 2020-08-20

## 2020-08-20 ENCOUNTER — AMBULATORY - HEALTHEAST (OUTPATIENT)
Dept: ADDICTION MEDICINE | Facility: HOSPITAL | Age: 40
End: 2020-08-20

## 2020-08-20 DIAGNOSIS — F15.20 METHAMPHETAMINE USE DISORDER, MODERATE (H): ICD-10-CM

## 2020-08-26 ENCOUNTER — OFFICE VISIT - HEALTHEAST (OUTPATIENT)
Dept: ADDICTION MEDICINE | Facility: HOSPITAL | Age: 40
End: 2020-08-26

## 2020-08-26 DIAGNOSIS — F15.20 METHAMPHETAMINE USE DISORDER, MODERATE (H): ICD-10-CM

## 2020-08-27 ENCOUNTER — OFFICE VISIT - HEALTHEAST (OUTPATIENT)
Dept: ADDICTION MEDICINE | Facility: HOSPITAL | Age: 40
End: 2020-08-27

## 2020-08-27 DIAGNOSIS — F15.20 METHAMPHETAMINE USE DISORDER, MODERATE (H): ICD-10-CM

## 2020-08-28 ENCOUNTER — AMBULATORY - HEALTHEAST (OUTPATIENT)
Dept: ADDICTION MEDICINE | Facility: HOSPITAL | Age: 40
End: 2020-08-28

## 2020-08-31 ENCOUNTER — OFFICE VISIT - HEALTHEAST (OUTPATIENT)
Dept: ADDICTION MEDICINE | Facility: HOSPITAL | Age: 40
End: 2020-08-31

## 2020-08-31 DIAGNOSIS — F15.20 METHAMPHETAMINE USE DISORDER, MODERATE (H): ICD-10-CM

## 2020-09-02 ENCOUNTER — OFFICE VISIT - HEALTHEAST (OUTPATIENT)
Dept: ADDICTION MEDICINE | Facility: HOSPITAL | Age: 40
End: 2020-09-02

## 2020-09-02 DIAGNOSIS — F15.20 METHAMPHETAMINE USE DISORDER, MODERATE (H): ICD-10-CM

## 2020-09-03 ENCOUNTER — AMBULATORY - HEALTHEAST (OUTPATIENT)
Dept: ADDICTION MEDICINE | Facility: HOSPITAL | Age: 40
End: 2020-09-03

## 2020-09-03 ENCOUNTER — OFFICE VISIT - HEALTHEAST (OUTPATIENT)
Dept: ADDICTION MEDICINE | Facility: HOSPITAL | Age: 40
End: 2020-09-03

## 2020-09-03 DIAGNOSIS — F15.20 METHAMPHETAMINE USE DISORDER, MODERATE (H): ICD-10-CM

## 2020-09-04 ENCOUNTER — AMBULATORY - HEALTHEAST (OUTPATIENT)
Dept: ADDICTION MEDICINE | Facility: HOSPITAL | Age: 40
End: 2020-09-04

## 2020-09-04 ENCOUNTER — MYC MEDICAL ADVICE (OUTPATIENT)
Dept: FAMILY MEDICINE | Facility: CLINIC | Age: 40
End: 2020-09-04

## 2020-09-04 DIAGNOSIS — F15.20 AMPHETAMINE USE DISORDER, MODERATE (H): ICD-10-CM

## 2020-09-09 ENCOUNTER — OFFICE VISIT - HEALTHEAST (OUTPATIENT)
Dept: ADDICTION MEDICINE | Facility: HOSPITAL | Age: 40
End: 2020-09-09

## 2020-09-09 DIAGNOSIS — F15.20 AMPHETAMINE USE DISORDER, MODERATE (H): ICD-10-CM

## 2020-09-10 ENCOUNTER — OFFICE VISIT (OUTPATIENT)
Dept: FAMILY MEDICINE | Facility: CLINIC | Age: 40
End: 2020-09-10
Payer: COMMERCIAL

## 2020-09-10 ENCOUNTER — OFFICE VISIT - HEALTHEAST (OUTPATIENT)
Dept: ADDICTION MEDICINE | Facility: HOSPITAL | Age: 40
End: 2020-09-10

## 2020-09-10 ENCOUNTER — AMBULATORY - HEALTHEAST (OUTPATIENT)
Dept: ADDICTION MEDICINE | Facility: HOSPITAL | Age: 40
End: 2020-09-10

## 2020-09-10 VITALS
OXYGEN SATURATION: 98 % | BODY MASS INDEX: 33.95 KG/M2 | HEART RATE: 106 BPM | DIASTOLIC BLOOD PRESSURE: 86 MMHG | WEIGHT: 204 LBS | TEMPERATURE: 98.1 F | SYSTOLIC BLOOD PRESSURE: 118 MMHG

## 2020-09-10 DIAGNOSIS — M25.531 RIGHT WRIST PAIN: Primary | ICD-10-CM

## 2020-09-10 DIAGNOSIS — F15.20 AMPHETAMINE USE DISORDER, MODERATE (H): ICD-10-CM

## 2020-09-10 ASSESSMENT — ENCOUNTER SYMPTOMS
NUMBNESS: 1
WEAKNESS: 1
JOINT SWELLING: 1
ARTHRALGIAS: 1

## 2020-09-10 NOTE — PATIENT INSTRUCTIONS
Here is the plan from today's visit    1. Right wrist pain  Your wrist pain is likely a combination of tendonitis from repetitive movements (pizza cutting) and possibly some nerve compression, like carpal tunnel syndrome. Please wear your brace during work and when sleeping, but give yourself breaks from it and do gentle movements with the wrist 1-2 times per day. You will get a call from hand therapy to set up an appointment, which should also help. In the next 1-2 weeks, you can also consider taking naproxn 500mg twice a day with meals to help calm down any inflammation in your tendons. Only do this for 1-2 weeks and then take naproxen or ibuprofen only as needed. Let me know if the pain is worsening or not improving with hand therapy and the bracing!  - [facility], PT, HAND AND CHIROPRACTIC REFERRAL - [facility]; Future    Please call or return to clinic if your symptoms don't go away.    Thank you for coming to [facility] today.  Lab Testing:  **If you had lab testing today and your results are reassuring or normal they will be mailed to you or sent through "[employer] within 7 days.   **If the lab tests need quick action we will call you with the results.  The phone number we will call with results is # [phone] (home) . If this is not the best number please call our clinic and change the number.  Medication Refills:  If you need any refills please call your pharmacy and they will contact us.   If you need to  your refill at a new pharmacy, please contact the new pharmacy directly. The new pharmacy will help you get your medications transferred faster.   Scheduling:  If you have any concerns about today's visit or wish to schedule another appointment please call our office during normal business hours [phone] (8-5:00 M-F)  If a referral was made to a [facility] and you don't get a call from central scheduling please call [phone].  If a Mammogram was ordered for you at [facility] call [phone] to schedule or change your appointment.  If you had an XRay/CT/Ultrasound/MRI ordered the number is [phone] to schedule or change your radiology appointment.   Medical Concerns:  If you have urgent medical concerns please call [phone] at any time of the day.    Nadine Newman MD

## 2020-09-10 NOTE — PROGRESS NOTES
Jesus is a 39 year old  who presents for   Patient presents with:  Trauma: Patient states their right wrist has been painful for 2 months. Was seen at [facility] 2 weeks ago. Pain has not subsided.       Assessment and Plan      Jesus was seen today for right wrist pain.    Diagnoses and all orders for this visit:    Right wrist pain  2 months of increasing right wrist pain and one week of associated finger numbness. No history of trauma and previous x-rays negative for fracture. On exam, there is tenderness over the posterolateral wrist, Phalen's test is positive, and Tinel's and Finkelstein's signs are negative. This is likely a combination of tendonitis and carpal tunnel syndrome.The patient was given a wrist brace to wear at night and at work and referred for hand therapy. Will advise scheduled naproxen for the next 1-2 weeks to tamper down inflammation. PRN after.   -     [facility], PT, HAND AND CHIROPRACTIC REFERRAL - [facility]; Future         Options for treatment and follow-up care were reviewed with the patient. Jesus Hurd  engaged in the decision making process and verbalized understanding of the options discussed and agreed with the final plan.    Nadine Newman MD         HPI       Jesus is a 39 year old  who presents for   Patient presents with:  Trauma: Patient states their right wrist has been painful for 2 months. Was seen at [facility] 2 weeks ago. Pain has not subsided.       Visit [place]  1.Right wrist pain  Jesus Hurd is a 39 year old right-handed male who presents with 2 months of gradually increasing right wrist pain and mild swelling. 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 Any movements of the wrist make the pain worse, and Tylenol and ibuprofen mildly improve the pain. The pain is localized to the wrist. He has found it increasingly difficult to perform tasks at work due to increasing  pain and weakness. 2 weeks ago, he went to urgent care and had x-rays that were negative for any fractures, and over the past week he has developed associated numbness in all of his right fingers.    Patient is an established patient of this clinic..  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)     Chondromalacia patellae of right knee     Gender dysphoria in adolescent and adult     BMI 37.0-37.9, adult     Tobacco abuse, in remission     S/P bilateral mastectomy     Past Surgical History:   Procedure Laterality Date     BREAST SURGERY       ENT SURGERY  10/24/2017    Plastic Nose Repair     TRANSGENDER MASTECTOMY Bilateral 1/8/2020    Procedure: Bilateral Simple Mastectomy, with Nipple Grafts. OnQ;  Surgeon: Sheree Bear MD;  Location:  OR       Social History     Tobacco Use     Smoking status: Former Smoker     Packs/day: 0.50     Years: 21.00     Pack years: 10.50     Smokeless tobacco: Never Used     Tobacco comment: using nicotine inhaler which helps   Substance Use Topics     Alcohol use: No     Family History   Problem Relation Age of Onset     Diabetes Mother      Mental Illness Mother      Diabetes Maternal Grandmother      Breast Cancer Maternal Grandmother      Mental Illness Maternal Grandmother      Mental Illness Maternal Grandfather      Mental Illness Brother          Reviewed and updated as needed this visit by clinical staff  Tobacco  Allergies  Meds  Med Hx       Reviewed and updated as needed this visit by Provider         Health Maintenance Due   Topic Date Due     INFLUENZA VACCINE (1) 09/01/2020     PAP  09/25/2020          Review of Systems:   Review of Systems   Musculoskeletal: Positive for arthralgias (right wrist) and joint swelling.   Neurological: Positive for weakness (right wrist) and numbness (right fingers).     All ROS  was negative except those noted in HPI       Physical Exam:     Vitals:    09/10/20 0806   BP: 118/86   BP Location: Left arm   Patient Position: Sitting   Cuff Size: Adult Large   Pulse: 106   Temp: 98.1  F (36.7  C)   TempSrc: Oral   SpO2: 98%   Weight: 92.5 kg (204 lb)     Body mass index is 33.95 kg/m .  Vitals were reviewed and were normal  Physical Exam   GEN: Alert, oriented person in NAD  HENT: normocephalic, atraumatic  NECK: full ROM  RESPIRATORY: no respiratory distress, no extra WOB, speaking in full sentences  CVS: regular rate, WWP  MSK: full ROM, no obvious peripheral edema. Normal gait.  NEURO: no FND.     Right WRIST:  Inspection: mild swelling, no effusion  Palpation: Tender: posterolateral  Range of Motion: extension:  painful  Strength:  strength reduced on right compared to left, otherwise WNL.    Special tests: negative Tinel's at carpal tunnel, positive Phalen's, negative Finkelstein's.      ELBOW:  elbow exam not done      Results:   No testing ordered today      Tory Ingram, MS3     I was present with the medical student who participated in the service and in the documentation of this note. I have verified the history and personally performed the physical exam and medical decision making, and have verified the content of the note, which accurately reflects my assessment of the patient and the plan of care.  Nadine Newman MD  [facility]

## 2020-09-14 ENCOUNTER — OFFICE VISIT - HEALTHEAST (OUTPATIENT)
Dept: ADDICTION MEDICINE | Facility: HOSPITAL | Age: 40
End: 2020-09-14

## 2020-09-14 DIAGNOSIS — F15.20 AMPHETAMINE USE DISORDER, MODERATE (H): ICD-10-CM

## 2020-09-15 ENCOUNTER — COMMUNICATION - HEALTHEAST (OUTPATIENT)
Dept: ADDICTION MEDICINE | Facility: HOSPITAL | Age: 40
End: 2020-09-15

## 2020-09-15 ENCOUNTER — AMBULATORY - HEALTHEAST (OUTPATIENT)
Dept: ADDICTION MEDICINE | Facility: HOSPITAL | Age: 40
End: 2020-09-15

## 2020-09-16 ENCOUNTER — COMMUNICATION - HEALTHEAST (OUTPATIENT)
Dept: ADDICTION MEDICINE | Facility: HOSPITAL | Age: 40
End: 2020-09-16

## 2020-09-17 ENCOUNTER — OFFICE VISIT - HEALTHEAST (OUTPATIENT)
Dept: ADDICTION MEDICINE | Facility: HOSPITAL | Age: 40
End: 2020-09-17

## 2020-09-17 DIAGNOSIS — F15.20 AMPHETAMINE USE DISORDER, MODERATE (H): ICD-10-CM

## 2020-09-18 ENCOUNTER — MYC MEDICAL ADVICE (OUTPATIENT)
Dept: FAMILY MEDICINE | Facility: CLINIC | Age: 40
End: 2020-09-18

## 2020-09-21 ENCOUNTER — OFFICE VISIT - HEALTHEAST (OUTPATIENT)
Dept: ADDICTION MEDICINE | Facility: HOSPITAL | Age: 40
End: 2020-09-21

## 2020-09-21 DIAGNOSIS — F15.20 AMPHETAMINE USE DISORDER, MODERATE (H): ICD-10-CM

## 2020-09-22 ENCOUNTER — AMBULATORY - HEALTHEAST (OUTPATIENT)
Dept: ADDICTION MEDICINE | Facility: HOSPITAL | Age: 40
End: 2020-09-22

## 2020-09-23 ENCOUNTER — OFFICE VISIT - HEALTHEAST (OUTPATIENT)
Dept: ADDICTION MEDICINE | Facility: HOSPITAL | Age: 40
End: 2020-09-23

## 2020-09-23 DIAGNOSIS — F15.20 AMPHETAMINE USE DISORDER, MODERATE (H): ICD-10-CM

## 2020-09-24 ENCOUNTER — THERAPY VISIT (OUTPATIENT)
Dept: OCCUPATIONAL THERAPY | Facility: CLINIC | Age: 40
End: 2020-09-24
Attending: FAMILY MEDICINE
Payer: COMMERCIAL

## 2020-09-24 ENCOUNTER — OFFICE VISIT - HEALTHEAST (OUTPATIENT)
Dept: ADDICTION MEDICINE | Facility: HOSPITAL | Age: 40
End: 2020-09-24

## 2020-09-24 DIAGNOSIS — F15.20 AMPHETAMINE USE DISORDER, MODERATE (H): ICD-10-CM

## 2020-09-24 DIAGNOSIS — M25.531 RIGHT WRIST PAIN: Primary | ICD-10-CM

## 2020-09-24 PROCEDURE — 97112 NEUROMUSCULAR REEDUCATION: CPT | Mod: GO | Performed by: OCCUPATIONAL THERAPIST

## 2020-09-24 PROCEDURE — 97760 ORTHOTIC MGMT&TRAING 1ST ENC: CPT | Mod: GO | Performed by: OCCUPATIONAL THERAPIST

## 2020-09-24 PROCEDURE — 97165 OT EVAL LOW COMPLEX 30 MIN: CPT | Mod: GO | Performed by: OCCUPATIONAL THERAPIST

## 2020-09-24 NOTE — PROGRESS NOTES
Hand Therapy Initial Evaluation    Current Date:  9/24/2020  Diagnosis: Right wrist pain  DOI: 9/10/20 (md order date)    Subjective:  Jesus Hurd is a 39 year old adult male.    Patient reports symptoms of the right wrist which occurred due to overuse at work. Since onset symptoms are Gradually getting worse.  General health as reported by patient is fair.  Pertinent medical history includes:Chemical Dependency, Concussions/Dizziness, Depression, Mental Illness, Migraines/Headaches, Numbness/Tingling, Sleep Disorder/Apnea, Smoking  Medical allergies: nickel seroquil.  Surgical history: other: gender confirmation.  Medication history: Anti-depressants, Hormone Replacement, Sleep, cholesterol.    Current occupation is  at a [employer]  Job Tasks: Lifting, Carrying, Prolonged Standing, Pushing, Pulling, Repetitive Tasks    Occupational Profile Information:  Right hand dominant  Prior functional level:  no limitations  Patient reports symptoms of pain, stiffness/loss of motion, weakness/loss of strength, edema, numbness and tingling   Previous treatment: bracing  Barriers include:none  Mobility: No difficulty  Transportation: drives  Currently working in normal job with restrictions  Leisure activities/hobbies: sit and relax, TV    Functional Outcome Measure:   Upper Extremity Functional Index Score:  SCORE:   Column Totals: /80: (P) 36   (A lower score indicates greater disability.)    Objective:  Pain Level (Scale 0-10):   9/24/2020   At Rest 0/10   With Use 10/10     Pain Description:  Date 9/24/2020   Location wrist and hand   Pain Quality Sharp and Throbbing and numb   Frequency intermittent or constant     Pain is worst  daytime, during work and in the morning   Exacerbated by use   Relieved by rest   Progression unchanged     Posture  Forward Neck Posture and Rounded Forward Shoulders    Edema  Mild    (Circumference measured in cm)   9/24/2020 9/24/2020    L R   DWC 16.7 17.1      Sensation  [facility]-Jessica Monofilament Sensory Testing:  R hand 9/24/2020   Thumb 3.61   Index 3.61   Long 3.61   Ring RDN 3.61   Ring UDN 3.61   Small 3.61   1-65-2.83:  Normal  3.22-3.61:  Diminished light touch  3.84-4.31:  Diminished protective sensation  4.56-6.45:  Loss of protective sensation  6.65:  Deep pressure sensation  Absent:  Tested with no response    ROM  Pain Report: - none  + mild    ++ moderate    +++ severe   Wrist 9/24/2020 9/24/2020   AROM (PROM) L R   Extension 60 40   Flexion 55 25   RD 30 25   UD 28 10     Special Tests  Pain Report:  - none    + mild    ++ moderate    +++ severe    9/24/2020   Median Nerve Compression at Pronator +   Carpal Compression Test--Durkan Test (30 sec) +   Price Test for Lumbrical Incursion  (fist x 30 secs) +   Tinels at Carpal Tunnel +   Phalens NT     Neural Tension Testing  MNT: Median Neurodynamic Test (based on RENA Sterling's ULNT)   9/24/2020   0-5 Scale 2   Position:   0/5: Arm across abdomen in coronal plane  1/5: Depress shoulder, ER to neutral ABD shoulder to 45 degrees  2/5: ER shoulder to end range, keep elbow at 90 degrees  3/5: Extend elbow to 0 degrees  4/5: Fully supinate forearm  5/5: Extend wrist, fingers and thumb  Notes:    (+) indicates beyond grade level but less than nursing home to next level    (-) indicates over nursing home to level    S1  onset/change of patient's symptoms    S2 definite stop point based on patient's discomfort level    Strength   (Measured in pounds)  Pain Report: - none  + mild    ++ moderate    +++ severe    9/24/2020 9/24/2020   Trials L R   1  2  3 80 5   Average 80 5     Lat Pinch 9/24/2020 9/24/2020   Trials L R   1  2  3 19 11   Average 19 11     3 Pt Pinch 9/24/2020 9/24/2020   Trials L R   1  2  3 19 8    19 8     Tenderness: Pain level report on scale 0-10/10  WRIST PALPATION:  Date 9/24/2020   Side R   Ulna Styloid +   TFCC ++   Distal Radius -   Radial Styloid -   DRUJ NT   Volar Scaphoid +   Dorsal  Scaphoid +   Volar Lunate -   Dorsal Lunate -     Assessment:  Patient presents with symptoms consistent with diagnosis of right wrist pain, with conservative intervention.     Patient's limitations or Problem List includes:  Pain, Decreased ROM/motion, Increased edema, Weakness, Sensory disturbance, Hypomobility, Decreased , Decreased pinch and Tightness in musculature of the right wrist and hand which interferes with the patient's ability to perform Self Care Tasks (dressing, eating, bathing), Work Tasks, Sleep Patterns, Recreational Activities, Household Chores and Driving  as compared to previous level of function.    Rehab Potential:  Excellent - Return to full activity, no limitations    Patient will benefit from skilled Occupational Therapy to increase ROM, motion,  strength, pinch strength, stability of wrist and sensation and decrease pain and edema to return to previous activity level and resume normal daily tasks and to reach their rehab potential.    Barriers to Learning:  No barrier    Communication Issues:  Patient appears to be able to clearly communicate and understand verbal and written communication and follow directions correctly.    Chart Review: Chart Review, Brief history including review of medical and/or therapy records relating to the presenting problem and Simple history review with patient    Identified Performance Deficits: bathing/showering, dressing, functional mobility, hygiene and grooming, communication management, driving and community mobility, home establishment and management, meal preparation and cleanup, sleep, work and leisure activities    Assessment of Occupational Performance:  5 or more Performance Deficits    Clinical Decision Making (Complexity): Low complexity    Treatment Explanation:  The following has been discussed with the patient:  RX ordered/plan of care  Anticipated outcomes  Possible risks and side effects    Plan:  Frequency:  1 X week, once  daily  Duration:  for 6 weeks    Then     Frequency:  1 X month, once daily  Duration:  for 3 months      Treatment Plan:    Modalities:    US, Paraffin, and TENS   Therapeutic Exercise:    AROM, AAROM, PROM, Tendon Gliding, Blocking, Reverse Blocking, Extensor Tracking, Isotonics, Isometrics and Stabilization  Neuromuscular re-ed:   Nerve Gliding, Sensory re-education, Desensitization, Kinesthetic Training, Proprioceptive Training, Posture, Kinesiotaping and Strain Counter Strain  Manual Techniques:   Joint mobilization, Friction massage, Myofascial release and Manual edema mobilization  Orthotic Fabrication:    Static orthosis and Static progressive orthosis  Self Care:    Self Care Tasks, Ergonomic Considerations and Work Tasks    Discharge Plan:  Achieve all LTG.  Independent in home treatment program.  Reach maximal therapeutic benefit.    Home Exercise Program:  Carpal Tunnel Syndrome Prevention   EMR Notes   HEP -  Sets   Reps   Sessions per day   Notes   Wrist Active Range of Motion Extension and Flexion Combined   EMR Notes   HEP -  Sets   Reps 5-10   Sessions per day 2-3   Notes First thing in the morning and then throughout the day   Wrist Active Range of Motion Radial and Ulnar Deviation for deQuervains   EMR Notes   HEP -  Sets   Reps 5-10   Sessions per day 2-3   Notes First thing in the morning and then throughout the day   Forearm Functional Range of Motion for Pronation/Supination   EMR Notes   HEP -  Sets   Reps   Sessions per day   Notes First thing in the morning and then throughout the day   Nerve Gliding Proximal Median   EMR Notes   HEP -  Sets 1   Reps 10   Sessions per day 3   Notes     Next Visit:  Workplace ergonomics   Assess the brace he wears at work and determine if sufficient  Progress HEP   MFR

## 2020-09-28 ENCOUNTER — COMMUNICATION - HEALTHEAST (OUTPATIENT)
Dept: ADDICTION MEDICINE | Facility: HOSPITAL | Age: 40
End: 2020-09-28

## 2020-09-30 ENCOUNTER — COMMUNICATION - HEALTHEAST (OUTPATIENT)
Dept: ADDICTION MEDICINE | Facility: HOSPITAL | Age: 40
End: 2020-09-30

## 2020-09-30 ENCOUNTER — AMBULATORY - HEALTHEAST (OUTPATIENT)
Dept: ADDICTION MEDICINE | Facility: HOSPITAL | Age: 40
End: 2020-09-30

## 2020-10-01 ENCOUNTER — OFFICE VISIT (OUTPATIENT)
Dept: FAMILY MEDICINE | Facility: CLINIC | Age: 40
End: 2020-10-01
Payer: COMMERCIAL

## 2020-10-01 VITALS
DIASTOLIC BLOOD PRESSURE: 84 MMHG | OXYGEN SATURATION: 98 % | WEIGHT: 201 LBS | TEMPERATURE: 98 F | BODY MASS INDEX: 33.45 KG/M2 | SYSTOLIC BLOOD PRESSURE: 126 MMHG | HEART RATE: 110 BPM

## 2020-10-01 DIAGNOSIS — Z23 NEED FOR PROPHYLACTIC VACCINATION AND INOCULATION AGAINST INFLUENZA: ICD-10-CM

## 2020-10-01 DIAGNOSIS — D75.1 POLYCYTHEMIA: ICD-10-CM

## 2020-10-01 DIAGNOSIS — E78.2 MIXED HYPERLIPIDEMIA: ICD-10-CM

## 2020-10-01 DIAGNOSIS — Z20.2 CONTACT WITH AND (SUSPECTED) EXPOSURE TO INFECTIONS WITH A PREDOMINANTLY SEXUAL MODE OF TRANSMISSION: ICD-10-CM

## 2020-10-01 DIAGNOSIS — N95.2 VAGINAL ATROPHY: ICD-10-CM

## 2020-10-01 DIAGNOSIS — F64.0 GENDER DYSPHORIA IN ADULT: Primary | ICD-10-CM

## 2020-10-01 LAB
ALBUMIN SERPL-MCNC: 4.7 MG/DL (ref 3.8–5)
ALP SERPL-CCNC: 84.5 U/L (ref 31.7–110.5)
ALT SERPL-CCNC: 38.8 U/L (ref 0–45)
AST SERPL-CCNC: 45.7 U/L (ref 0–45)
BILIRUB SERPL-MCNC: 0.5 MG/DL (ref 0.2–1.3)
BUN SERPL-MCNC: 6.7 MG/DL (ref 7–19)
CALCIUM SERPL-MCNC: 10.3 MG/DL (ref 8.5–10.1)
CHLORIDE SERPLBLD-SCNC: 99.2 MMOL/L (ref 98–110)
CHOLEST SERPL-MCNC: 223 MG/DL (ref 0–200)
CHOLEST/HDLC SERPL: 6.5 {RATIO} (ref 0–5)
CO2 SERPL-SCNC: 27.8 MMOL/L (ref 20–32)
CREAT SERPL-MCNC: 0.8 MG/DL (ref 0.5–1)
GFR SERPL CREATININE-BSD FRML MDRD: 87.2 ML/MIN/1.7 M2
GLUCOSE SERPL-MCNC: 82.7 MG'DL (ref 70–99)
HCT VFR BLD AUTO: 55.8 % (ref 35–47)
HDLC SERPL-MCNC: 34.4 MG/DL
HEMOGLOBIN: 17.2 G/DL (ref 11.7–15.7)
LDLC SERPL CALC-MCNC: 141 MG/DL (ref 0–129)
MCH RBC QN AUTO: 30.2 PG (ref 26.5–35)
MCHC RBC AUTO-ENTMCNC: 30.8 G/DL (ref 32–36)
MCV RBC AUTO: 97.9 FL (ref 78–100)
PLATELET # BLD AUTO: 304 K/UL (ref 150–450)
POTASSIUM SERPL-SCNC: 3.8 MMOL/L (ref 3.3–4.5)
PROT SERPL-MCNC: 7.8 G/DL (ref 6.8–8.8)
RBC # BLD AUTO: 5.7 M/UL (ref 3.8–5.2)
SODIUM SERPL-SCNC: 133.1 MMOL/L (ref 132.6–141.4)
TRIGL SERPL-MCNC: 237.8 MG/DL (ref 0–150)
VLDL CHOLESTEROL: 47.6 MG/DL (ref 7–32)
WBC # BLD AUTO: 10.4 K/UL (ref 4–11)

## 2020-10-01 PROCEDURE — 90471 IMMUNIZATION ADMIN: CPT | Performed by: STUDENT IN AN ORGANIZED HEALTH CARE EDUCATION/TRAINING PROGRAM

## 2020-10-01 PROCEDURE — 36415 COLL VENOUS BLD VENIPUNCTURE: CPT | Performed by: STUDENT IN AN ORGANIZED HEALTH CARE EDUCATION/TRAINING PROGRAM

## 2020-10-01 PROCEDURE — 90686 IIV4 VACC NO PRSV 0.5 ML IM: CPT | Performed by: STUDENT IN AN ORGANIZED HEALTH CARE EDUCATION/TRAINING PROGRAM

## 2020-10-01 PROCEDURE — 99214 OFFICE O/P EST MOD 30 MIN: CPT | Mod: 25 | Performed by: STUDENT IN AN ORGANIZED HEALTH CARE EDUCATION/TRAINING PROGRAM

## 2020-10-01 PROCEDURE — 99000 SPECIMEN HANDLING OFFICE-LAB: CPT | Performed by: STUDENT IN AN ORGANIZED HEALTH CARE EDUCATION/TRAINING PROGRAM

## 2020-10-01 PROCEDURE — 87491 CHLMYD TRACH DNA AMP PROBE: CPT | Performed by: STUDENT IN AN ORGANIZED HEALTH CARE EDUCATION/TRAINING PROGRAM

## 2020-10-01 PROCEDURE — 84403 ASSAY OF TOTAL TESTOSTERONE: CPT | Performed by: STUDENT IN AN ORGANIZED HEALTH CARE EDUCATION/TRAINING PROGRAM

## 2020-10-01 PROCEDURE — 85027 COMPLETE CBC AUTOMATED: CPT | Performed by: STUDENT IN AN ORGANIZED HEALTH CARE EDUCATION/TRAINING PROGRAM

## 2020-10-01 PROCEDURE — 80061 LIPID PANEL: CPT | Performed by: STUDENT IN AN ORGANIZED HEALTH CARE EDUCATION/TRAINING PROGRAM

## 2020-10-01 PROCEDURE — 80053 COMPREHEN METABOLIC PANEL: CPT | Performed by: STUDENT IN AN ORGANIZED HEALTH CARE EDUCATION/TRAINING PROGRAM

## 2020-10-01 PROCEDURE — 87591 N.GONORRHOEAE DNA AMP PROB: CPT | Performed by: STUDENT IN AN ORGANIZED HEALTH CARE EDUCATION/TRAINING PROGRAM

## 2020-10-01 RX ORDER — TESTOSTERONE CYPIONATE 200 MG/ML
50 INJECTION, SOLUTION INTRAMUSCULAR WEEKLY
Qty: 4 ML | Refills: 5 | Status: SHIPPED | OUTPATIENT
Start: 2020-10-01 | End: 2021-04-12

## 2020-10-01 RX ORDER — GABAPENTIN 300 MG/1
CAPSULE ORAL
Refills: 0 | COMMUNITY
Start: 2020-10-01 | End: 2023-08-01

## 2020-10-01 RX ORDER — ESTRADIOL 0.1 MG/G
1 CREAM VAGINAL DAILY
Qty: 42.5 G | Refills: 0 | Status: SHIPPED | OUTPATIENT
Start: 2020-10-01 | End: 2020-12-08

## 2020-10-01 RX ORDER — ATORVASTATIN CALCIUM 40 MG/1
40 TABLET, FILM COATED ORAL DAILY
Qty: 90 TABLET | Refills: 1 | Status: SHIPPED | OUTPATIENT
Start: 2020-10-01 | End: 2021-03-30

## 2020-10-01 RX ORDER — TESTOSTERONE CYPIONATE 200 MG/ML
60 INJECTION, SOLUTION INTRAMUSCULAR WEEKLY
Qty: 4 ML | Refills: 5 | Status: SHIPPED | OUTPATIENT
Start: 2020-10-01 | End: 2020-10-01

## 2020-10-01 NOTE — PROGRESS NOTES
SHAMEKA Lee is a 40 year old individual that uses pronouns He/Him/His/Himself that presents today for follow up of:  feminizing hormone therapy.     Gender identity: male    Any special concerns today?  STI exposure     On hormones?  YES +++ Shot day of the week? Saturday      Due for labs?  Yes      +++ Refills of meds needed?  Yes    Gender affirmation is being sought in these other ways:   Out at work, status post top surgery.  Name and pronouns align with identity.  Gender markers changed.  Ongoing therapy concerning sobriety and mental health.    Family Stress- supervised visits with younger two children. Face timing with older kids. CPS may be pursuing placement.   3 days a week, outpatient treatment. Flexible job.     New relationship with cisfemale- stable.   ---    Past Surgical History:   Procedure Laterality Date     BREAST SURGERY       ENT SURGERY  10/24/2017    Plastic Nose Repair     TRANSGENDER MASTECTOMY Bilateral 1/8/2020    Procedure: Bilateral Simple Mastectomy, with Nipple Grafts. OnQ;  Surgeon: Sheree Bear MD;  Location: [facility] OR       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)     Chondromalacia patellae of right knee     Gender dysphoria in adult     BMI 37.0-37.9, adult     Tobacco abuse, in remission     S/P bilateral mastectomy     Right wrist pain       Current Outpatient Medications   Medication Sig Dispense Refill     atorvastatin (LIPITOR) 40 MG tablet Take 1 tablet (40 mg) by mouth daily 90 tablet 1     buPROPion (WELLBUTRIN XL) 150 MG 24 hr tablet Take 150 mg by mouth daily Take with 300mg tab for dose of 450 mg daily  4     buPROPion (WELLBUTRIN XL) 300 MG 24 hr tablet Take 300 mg by mouth daily  1     escitalopram (LEXAPRO) 5 MG tablet TK 1 T PO QD  1     estradiol (ESTRACE) 0.1 MG/GM vaginal cream Place 1 g vaginally  "daily For 10-14 days leading up to PAP 42.5 g 0     gabapentin (NEURONTIN) 300 MG capsule Take 3 capsules (900 mg) by mouth At Bedtime AND 2 capsules (600 mg) every morning.  0     needle, disp, 18G X 1\" MISC Use to draw up hormones once weekly 25 each 3     Needle, Disp, 25G X 1-1/2\" MISC Use once weekly for administering hormone IM 25 each 3     syringe, disposable, 1 ML MISC Use once weekly to draw up hormones 25 each 3     testosterone cypionate (DEPOTESTOSTERONE) 200 MG/ML injection Inject 0.25 mLs (50 mg) into the muscle once a week 4 mL 5     vitamin D3 (CHOLECALCIFEROL) 50 mcg (2000 units) tablet Take 1 tablet (50 mcg) by mouth daily 90 tablet 1       History   Smoking Status     Former Smoker     Packs/day: 0.50     Years: 21.00   Smokeless Tobacco     Never Used     Comment: using nicotine inhaler which helps          Allergies   Allergen Reactions     Nickel Itching     Seroquel [Quetiapine] Other (See Comments)     Restless leg syndrom       Problem, Medication and Allergy Lists were reviewed and are current..         Review of Systems:        Cardiovascular (CV)    Chest Pains: no    Shortness of breath: no Chest    Decreased exercise tolerance:  no    Breast changes/development: not applicable     Gastrointestinal (GI)    Abdominal pain: no    Change in appetite: no Skin    Acne or oily skin: YES    Change in hair: YES     Genitourinary ()    Abnormal vaginal bleeding: no     Decreased spontaneous erections: not applicable    Change in libido: no    New sexual partners: YES- cis female.  May have had STI exposure.  No symptoms of vaginal pain or discharge.  No urinary dysuria. Musculoskeletal    Leg pain or swelling: no     Psychiatric (Psych)    Depression: stable    Anxiety/Panic: stable    Mood:  \"okay\"                Physical Exam:     Vitals:    10/01/20 1001   BP: 126/84   BP Location: Left arm   Patient Position: Sitting   Cuff Size: Adult Large   Pulse: 110   Temp: 98  F (36.7  C)   TempSrc: " Oral   SpO2: 98%   Weight: 91.2 kg (201 lb)     BMI= Body mass index is 33.45 kg/m .   Wt Readings from Last 10 Encounters:   10/01/20 91.2 kg (201 lb)   09/10/20 92.5 kg (204 lb)   01/08/20 101.1 kg (222 lb 14.2 oz)   12/30/19 102.9 kg (226 lb 12.8 oz)   12/10/19 100.5 kg (221 lb 9.6 oz)   12/06/19 101.3 kg (223 lb 6.4 oz)   11/05/19 102.1 kg (225 lb)   08/08/19 106.5 kg (234 lb 12.8 oz)   07/02/19 107.9 kg (237 lb 12.8 oz)   01/22/19 110.6 kg (243 lb 12.8 oz)     Appearance: Male appearance and dress  General: Alert and oriented, in no acute distress.  Skin: Warm and dry, no abnormalities noted.  Eyes: Extra-ocular muscles intact, pupils equal and reactive.  ENT: Speech intact, nasal passages open, no hearing impairment noted.  CV: No cyanosis or pallor, warm and well perfused.  Respiratory: No respiratory distress, no accessory muscle use.  Neuro: Gait and station normal, comprehension intact. Gross and fine motor skills intact.   Psychiatric: Mood and affect appear normal.   Extremities: Warm, able to move all four extremities at will.  Affect: Appropriate/mood-congruent         Labs:      Results from the last 24 hours  Results for orders placed or performed in visit on 10/01/20 (from the past 24 hour(s))   CBC with Plt ([facility])   Result Value Ref Range    WBC 10.4 4.0 - 11.0 K/uL    RBC 5.70 (H) 3.80 - 5.20 M/uL    Hemoglobin 17.2 (H) 11.7 - 15.7 g/dL    Hematocrit 55.8 (H) 35.0 - 47.0 %    MCV 97.9 78.0 - 100.0 fL    MCH 30.2 26.5 - 35.0 pg    MCHC 30.8 (L) 32.0 - 36.0 g/dL    Platelets 304.0 150.0 - 450.0 K/uL   Lipid Cascade ([facility] [place])   Result Value Ref Range    Cholesterol 223.0 (H) 0.0 - 200.0 mg/dL    Cholesterol/HDL Ratio 6.5 (H) 0.0 - 5.0    HDL Cholesterol 34.4 (L) >40.0 mg/dL    Triglycerides 237.8 (H) 0.0 - 150.0 mg/dL    VLDL Cholesterol 47.6 (H) 7.0 - 32.0 mg/dL    LDL Cholesterol Calculated 141 (H) 0 - 129 mg/dL   Comprehensive Metabolic Panel ([place] [place])   Result Value Ref Range    Calcium  "10.3 (H) 8.5 - 10.1 mg/dL    Chloride 99.2 98.0 - 110.0 mmol/L    Carbon Dioxide 27.8 20.0 - 32.0 mmol/L    Creatinine 0.8 0.5 - 1.0 mg/dL    Glucose 82.7 70.0 - 99.0 mg'dL    Potassium 3.8 3.3 - 4.5 mmol/L    Sodium 133.1 132.6 - 141.4 mmol/L    Protein Total 7.8 6.8 - 8.8 g/dL    GFR Estimate 87.2 >60.0 mL/min/1.7 m2    GFR Estimate If Black >90 >60.0 mL/min/1.7 m2    Albumin 4.7 3.8 - 5.0 mg/dL    Alkaline Phosphatase 84.5 31.7 - 110.5 U/L    ALT 38.8 0.0 - 45.0 U/L    AST 45.7 (H) 0.0 - 45.0 U/L    Bilirubin Total 0.5 0.2 - 1.3 mg/dL    Urea Nitrogen 6.7 (L) 7.0 - 19.0 mg/dL     Testosterone,  and gonorrhea chlamydia testing in progress.  Assessment and Plan     Jesus was seen today for follow up, std and imm/inj.    Diagnoses and all orders for this visit:    Gender dysphoria in adult  -     Testosterone total  -     CBC with Plt ([facility])  -     Lipid Cascade ([facility])  -     Comprehensive Metabolic Panel ([facility])  -     Discontinue: testosterone cypionate (DEPOTESTOSTERONE) 200 MG/ML injection; Inject 0.3 mLs (60 mg) into the muscle once a week  -     needle, disp, 18G X 1\" MISC; Use to draw up hormones once weekly  -     Needle, Disp, 25G X 1-1/2\" MISC; Use once weekly for administering hormone IM  -     syringe, disposable, 1 ML MISC; Use once weekly to draw up hormones  -     testosterone cypionate (DEPOTESTOSTERONE) 200 MG/ML injection; Inject 0.25 mLs (50 mg) into the muscle once a week    Contact with and (suspected) exposure to infections with a predominantly sexual mode of transmission  -     Neisseria gonorrhoeae PCR  -     Chlamydia trachomatis PCR    Need for prophylactic vaccination and inoculation against influenza  -     INFLUENZA VACCINE IM > 6 MONTHS VALENT IIV4 [54403]    Vaginal atrophy  -     estradiol (ESTRACE) 0.1 MG/GM vaginal cream; Place 1 g vaginally daily For 10-14 days leading up to PAP    Mixed hyperlipidemia  -     atorvastatin (LIPITOR) 40 MG tablet; Take 1 tablet (40 mg) by " mouth daily    Polycythemia    Other orders  -     Discontinue: conjugated estrogens (PREMARIN) 0.625 MG/GM vaginal cream; Place 0.5 g vaginally daily as needed (prior to PAP exam)    Jesus is a 40-year-old transmale who is on testosterone therapy well-known to me.  Due for monitoring laboratories for gender dysphoria and testosterone medication.  Reviewed CMP which is reassuring with mild AST elevation not concerning as it is one-point above laboratory normal.  However, concerning is the elevated red blood cell count and polycythemia, which is new today, likely secondary to testosterone.  Patient has maintained same testosterone dose but has had weight loss which is in alignment with a healthier BMI.  Recommend dose reduction from 0.3 mL weekly to 0.25 mL weekly for testosterone.  Testosterone level in process.  Counseled patient concerning controlled substance not sharing medications, secure sending of prescription.  Patient is sexually active with this female partners and does not come into contact with sperm, thus contraception is not needed.  Lipids were elevated today, and reiterated importance of taking statin medication.  Dose reduction of testosterone may help with hyperlipidemia as well.  Refilled atorvastatin today.  Patient did also have new concern of possible exposure to STI.  Did order gonorrhea chlamydia testing.  Has not had new partners and HIV and hep C testing were completed in the last 6 months.  Patient is technically a lower risk individual as a heterosexual person who does not come into contact with MSM persons.  Was also amenable to influenza vaccination today.  Discussed other healthcare maintenance and that he is due for annual physical exam in December, but is due for Pap now.  Did order vaginal estrogen cream to facilitate better quality Pap.       Follow up:  Follow up in 2 months for CPE plus Pap.   Results by Roberts Chapelt  Questions were elicited and answered.     Emerson Machado DO,  MA  Pronouns: he/him/his    [facility]/ [facility]   Department of Family Medicine and Community Health

## 2020-10-02 ENCOUNTER — AMBULATORY - HEALTHEAST (OUTPATIENT)
Dept: ADDICTION MEDICINE | Facility: HOSPITAL | Age: 40
End: 2020-10-02

## 2020-10-02 LAB
C TRACH DNA SPEC QL NAA+PROBE: NEGATIVE
N GONORRHOEA DNA SPEC QL NAA+PROBE: NEGATIVE
SPECIMEN SOURCE: NORMAL
SPECIMEN SOURCE: NORMAL

## 2020-10-03 LAB — TESTOST SERPL-MCNC: 341 NG/DL (ref 8–60)

## 2020-10-05 ENCOUNTER — OFFICE VISIT - HEALTHEAST (OUTPATIENT)
Dept: ADDICTION MEDICINE | Facility: HOSPITAL | Age: 40
End: 2020-10-05

## 2020-10-05 DIAGNOSIS — F15.20 AMPHETAMINE USE DISORDER, MODERATE (H): ICD-10-CM

## 2020-10-06 ENCOUNTER — AMBULATORY - HEALTHEAST (OUTPATIENT)
Dept: ADDICTION MEDICINE | Facility: HOSPITAL | Age: 40
End: 2020-10-06

## 2020-10-07 ENCOUNTER — MYC MEDICAL ADVICE (OUTPATIENT)
Dept: FAMILY MEDICINE | Facility: CLINIC | Age: 40
End: 2020-10-07

## 2020-10-07 DIAGNOSIS — G56.01 CARPAL TUNNEL SYNDROME OF RIGHT WRIST: ICD-10-CM

## 2020-10-07 DIAGNOSIS — M25.531 RIGHT WRIST PAIN: Primary | ICD-10-CM

## 2020-10-07 NOTE — TELEPHONE ENCOUNTER
Diagnoses and all orders for this visit:    Right wrist pain  -     Orthopedic & Spine  Referral; Future    Carpal tunnel syndrome of right wrist  -     Orthopedic & Spine  Referral; Future    Following with hand therapy and still having symptoms. Would like Hand surgical opinion.      Emerson Machado, DO

## 2020-10-15 ENCOUNTER — COMMUNICATION - HEALTHEAST (OUTPATIENT)
Dept: ADDICTION MEDICINE | Facility: HOSPITAL | Age: 40
End: 2020-10-15

## 2020-10-16 ENCOUNTER — AMBULATORY - HEALTHEAST (OUTPATIENT)
Dept: ADDICTION MEDICINE | Facility: HOSPITAL | Age: 40
End: 2020-10-16

## 2020-10-19 ENCOUNTER — COMMUNICATION - HEALTHEAST (OUTPATIENT)
Dept: ADDICTION MEDICINE | Facility: HOSPITAL | Age: 40
End: 2020-10-19

## 2020-10-21 ENCOUNTER — COMMUNICATION - HEALTHEAST (OUTPATIENT)
Dept: ADDICTION MEDICINE | Facility: HOSPITAL | Age: 40
End: 2020-10-21

## 2020-10-21 ENCOUNTER — AMBULATORY - HEALTHEAST (OUTPATIENT)
Dept: ADDICTION MEDICINE | Facility: HOSPITAL | Age: 40
End: 2020-10-21

## 2020-10-21 ENCOUNTER — OFFICE VISIT - HEALTHEAST (OUTPATIENT)
Dept: ADDICTION MEDICINE | Facility: HOSPITAL | Age: 40
End: 2020-10-21

## 2020-10-21 DIAGNOSIS — F15.20 AMPHETAMINE USE DISORDER, MODERATE (H): ICD-10-CM

## 2020-10-23 NOTE — TELEPHONE ENCOUNTER
RECORDS RECEIVED FROM: Right wrist pain /Carpal tunnel syndrome of right wrist /Dr Machado/no images/Blue plus/ortho con   DATE RECEIVED: Oct 28, 2020     NOTES STATUS DETAILS   OFFICE NOTE from referring provider Care Everywhere    OFFICE NOTE from other specialist N/A    DISCHARGE SUMMARY from hospital N/A    DISCHARGE REPORT from the ER N/A    OPERATIVE REPORT N/A    MEDICATION LIST Internal    IMPLANT RECORD/STICKER N/A    LABS     CBC/DIFF N/A    CULTURES N/A    INJECTIONS DONE IN RADIOLOGY N/A    MRI N/A    CT SCAN N/A    XRAYS (IMAGES & REPORTS) In process    TUMOR     PATHOLOGY  Slides & report N/A    12/07/20   10:23 AM   Images resolved in PACS  Complete  Felisa Vernon CMA    12/04/20   3:58 PM   Called Sean and asked for images 2nd request  Felisa Vernon CMA    10/23/20   10:19 AM   Called Sean and asked for wrist images to be pushed  Felisa Vernon CMA

## 2020-10-28 ENCOUNTER — PRE VISIT (OUTPATIENT)
Dept: ORTHOPEDICS | Facility: CLINIC | Age: 40
End: 2020-10-28

## 2020-10-30 ENCOUNTER — AMBULATORY - HEALTHEAST (OUTPATIENT)
Dept: ADDICTION MEDICINE | Facility: HOSPITAL | Age: 40
End: 2020-10-30

## 2020-11-04 ENCOUNTER — AMBULATORY - HEALTHEAST (OUTPATIENT)
Dept: ADDICTION MEDICINE | Facility: HOSPITAL | Age: 40
End: 2020-11-04

## 2020-11-11 ENCOUNTER — OFFICE VISIT (OUTPATIENT)
Dept: FAMILY MEDICINE | Facility: CLINIC | Age: 40
End: 2020-11-11
Payer: COMMERCIAL

## 2020-11-11 VITALS
DIASTOLIC BLOOD PRESSURE: 86 MMHG | RESPIRATION RATE: 16 BRPM | HEIGHT: 65 IN | BODY MASS INDEX: 33.19 KG/M2 | WEIGHT: 199.2 LBS | SYSTOLIC BLOOD PRESSURE: 125 MMHG | HEART RATE: 112 BPM | OXYGEN SATURATION: 96 % | TEMPERATURE: 98.2 F

## 2020-11-11 DIAGNOSIS — Z12.4 SCREENING FOR MALIGNANT NEOPLASM OF CERVIX: Primary | ICD-10-CM

## 2020-11-11 PROCEDURE — 99213 OFFICE O/P EST LOW 20 MIN: CPT | Performed by: STUDENT IN AN ORGANIZED HEALTH CARE EDUCATION/TRAINING PROGRAM

## 2020-11-11 PROCEDURE — 87624 HPV HI-RISK TYP POOLED RSLT: CPT | Performed by: STUDENT IN AN ORGANIZED HEALTH CARE EDUCATION/TRAINING PROGRAM

## 2020-11-11 PROCEDURE — G0145 SCR C/V CYTO,THINLAYER,RESCR: HCPCS | Performed by: STUDENT IN AN ORGANIZED HEALTH CARE EDUCATION/TRAINING PROGRAM

## 2020-11-11 ASSESSMENT — MIFFLIN-ST. JEOR: SCORE: 1580.7

## 2020-11-11 NOTE — PROGRESS NOTES
"       HPI       Jesus Hurd is a 40 year old  who presents for   Chief Complaint   Patient presents with     Gyn Exam       Concern: Pap smear   Description of the problem : Patient is a transmale who presents for Pap smear today.  Did use topical/vaginal estrogen to prepare for examination and sample today.  Has never had history of abnormal Paps.  Last Pap was reviewed and was NIL June 23, 2017.  HPV cotesting was not performed.     +++++++      Problem, Medication and Allergy Lists were reviewed and updated if needed..    Patient is an established patient of this clinic..         Review of Systems:   Review of Systems         Physical Exam:     Vitals:    11/11/20 1339   BP: 125/86   Pulse: 112   Resp: 16   Temp: 98.2  F (36.8  C)   TempSrc: Oral   SpO2: 96%   Weight: 90.4 kg (199 lb 3.2 oz)   Height: 1.645 m (5' 4.76\")     Body mass index is 33.39 kg/m .  Vitals were reviewed and were normal except pulse which improved.      Physical Exam  Genitourinary:     Vagina: Normal. No bleeding.      Cervix: Lesion (2 cysts at 4 and 8) present. No cervical motion tenderness, discharge or friability.      Rectum: Normal.     General: Alert and oriented, in no acute distress.  Skin: Warm and dry, no abnormalities noted.  Eyes: Extra-ocular muscles intact, pupils equal and reactive.  ENT: Speech intact, nasal passages open, no hearing impairment noted.  CV: No cyanosis or pallor, warm and well perfused.  Respiratory: No respiratory distress, no accessory muscle use.  Neuro: Gait and station normal, comprehension intact. Gross and fine motor skills intact.   Psychiatric: Mood and affect appear normal.   Extremities: Warm, able to move all four extremities at will.      Results:   Results are ordered and pending    Assessment and Plan        Jesus was seen today for gyn exam.    Diagnoses and all orders for this visit:    Screening for malignant neoplasm of cervix  -     Pap imaged thin layer screen with HPV - " recommended age 30 - 65 years (select HPV order below)  -     HPV High Risk Types DNA Cervical      Patient presenting for Pap today.  Treated with topical estrogen prior as he is a transmale on testosterone.  Will MyChart with results.       There are no discontinued medications.    Options for treatment and follow-up care were reviewed with the patient. Jesus Hurd  engaged in the decision making process and verbalized understanding of the options discussed and agreed with the final plan.    Emerson Machado DO, MA  Pronouns: he/him/his    [facility]/ [facility]   Department of Family Medicine and Community Health

## 2020-11-13 LAB
COPATH REPORT: NORMAL
PAP: NORMAL

## 2020-11-17 LAB
FINAL DIAGNOSIS: NORMAL
HPV HR 12 DNA CVX QL NAA+PROBE: NEGATIVE
HPV16 DNA SPEC QL NAA+PROBE: NEGATIVE
HPV18 DNA SPEC QL NAA+PROBE: NEGATIVE
SPECIMEN DESCRIPTION: NORMAL
SPECIMEN SOURCE CVX/VAG CYTO: NORMAL

## 2020-11-18 ENCOUNTER — MYC MEDICAL ADVICE (OUTPATIENT)
Dept: FAMILY MEDICINE | Facility: CLINIC | Age: 40
End: 2020-11-18

## 2020-11-19 ENCOUNTER — COMMUNICATION - HEALTHEAST (OUTPATIENT)
Dept: ADDICTION MEDICINE | Facility: HOSPITAL | Age: 40
End: 2020-11-19

## 2020-12-08 ENCOUNTER — TELEPHONE (OUTPATIENT)
Dept: FAMILY MEDICINE | Facility: CLINIC | Age: 40
End: 2020-12-08

## 2020-12-08 ENCOUNTER — OFFICE VISIT (OUTPATIENT)
Dept: ORTHOPEDICS | Facility: CLINIC | Age: 40
End: 2020-12-08
Attending: STUDENT IN AN ORGANIZED HEALTH CARE EDUCATION/TRAINING PROGRAM
Payer: COMMERCIAL

## 2020-12-08 DIAGNOSIS — G56.02 LEFT CARPAL TUNNEL SYNDROME: Primary | ICD-10-CM

## 2020-12-08 DIAGNOSIS — M77.8 RIGHT WRIST TENDONITIS: ICD-10-CM

## 2020-12-08 DIAGNOSIS — G56.02 CARPAL TUNNEL SYNDROME OF LEFT WRIST: Primary | ICD-10-CM

## 2020-12-08 DIAGNOSIS — G56.01 CARPAL TUNNEL SYNDROME OF RIGHT WRIST: ICD-10-CM

## 2020-12-08 DIAGNOSIS — M25.531 RIGHT WRIST PAIN: ICD-10-CM

## 2020-12-08 PROCEDURE — 99214 OFFICE O/P EST MOD 30 MIN: CPT | Performed by: PLASTIC SURGERY

## 2020-12-08 RX ORDER — IBUPROFEN 600 MG/1
600 TABLET, FILM COATED ORAL
Qty: 63 TABLET | Refills: 0 | Status: SHIPPED | OUTPATIENT
Start: 2020-12-08 | End: 2020-12-29

## 2020-12-08 NOTE — PROGRESS NOTES
"REFERRING PROVIDER: Emerson Machado    REASON FOR CONSULTATION: Right wrist pain and swelling, left hand numbness.    HPI: Patient is a 40-year-old right-hand-dominant man referred to me by Dr. Emerson Machado for evaluation and possible surgical management of right wrist pain and swelling as well as left hand numbness.  Patient endorses a 4 to 5-month history of right wrist pain.  Denies any trauma.  Has tried splinting and hand therapy.  Despite this, he continues to have pain.  As for the left hand, patient has had symptoms of what he calls thumb tendinitis.  He has had numbness in the thumb, index, long, ring finger distribution with activity.  He often wakes up with this numbness and sharp pain that is referred proximally.  Denies any weakness.  He has tried thumb spica splinting which helped.  He has tried over-the-counter pain medications but this did not help.    MEDS:   Prior to Admission medications    Medication Sig Start Date End Date Taking? Authorizing Provider   atorvastatin (LIPITOR) 40 MG tablet Take 1 tablet (40 mg) by mouth daily 10/1/20  Yes Emerson Machado,    buPROPion (WELLBUTRIN XL) 150 MG 24 hr tablet Take 150 mg by mouth daily Take with 300mg tab for dose of 450 mg daily 11/14/19  Yes Reported, Patient   buPROPion (WELLBUTRIN XL) 300 MG 24 hr tablet Take 300 mg by mouth daily 7/18/19  Yes Reported, Patient   escitalopram (LEXAPRO) 5 MG tablet TK 1 T PO QD 6/18/19  Yes Reported, Patient   gabapentin (NEURONTIN) 300 MG capsule Take 3 capsules (900 mg) by mouth At Bedtime AND 2 capsules (600 mg) every morning. 10/1/20  Yes Emerson Machado,    needle, disp, 18G X 1\" MISC Use to draw up hormones once weekly 10/1/20  Yes Emerson Machado,    Needle, Disp, 25G X 1-1/2\" MISC Use once weekly for administering hormone IM 10/1/20  Yes Emerson Machado,    syringe, disposable, 1 ML MISC Use once weekly to draw up hormones 10/1/20  Yes Emerson Machado, DO   testosterone cypionate (DEPOTESTOSTERONE) 200 MG/ML " injection Inject 0.25 mLs (50 mg) into the muscle once a week 10/1/20  Yes Emerson Machado DO   vitamin D3 (CHOLECALCIFEROL) 50 mcg (2000 units) tablet Take 1 tablet (50 mcg) by mouth daily 7/7/20  Yes Nila Blount MD   estradiol (ESTRACE) 0.1 MG/GM vaginal cream Place 1 g vaginally daily For 10-14 days leading up to PAP 10/1/20   Emerson Machado DO       ALLERGIES:   Allergies   Allergen Reactions     Nickel Itching     Seroquel [Quetiapine] Other (See Comments)     Restless leg syndrom       PMH:   Past Medical History:   Diagnosis Date     Arthritis      Depressive disorder        PSH:   Past Surgical History:   Procedure Laterality Date     BREAST SURGERY       ENT SURGERY  10/24/2017    Plastic Nose Repair     TRANSGENDER MASTECTOMY Bilateral 1/8/2020    Procedure: Bilateral Simple Mastectomy, with Nipple Grafts. OnQ;  Surgeon: Sheree Bear MD;  Location: [facility] OR       SH:   Social History     Tobacco Use     Smoking status: Light Tobacco Smoker     Packs/day: 0.50     Years: 21.00     Pack years: 10.50     Smokeless tobacco: Never Used     Tobacco comment: using nicotine inhaler which helps   Substance Use Topics     Alcohol use: No   Works as a cook.    FH:   Family History   Problem Relation Age of Onset     Diabetes Mother      Mental Illness Mother      Diabetes Maternal Grandmother      Breast Cancer Maternal Grandmother      Mental Illness Maternal Grandmother      Mental Illness Maternal Grandfather      Mental Illness Brother        ROS: Denies chest pain, shortness of breath, diabetes, MI, CVA, DVT, PE, and bleeding disorders.    PHYSICAL EXAMINATION:   General: No acute distress.  On examination of the left hand, patient has mild thenar atrophy when compared to the right.  He is however able to oppose the thumb to the index and small fingers.  Tinel's positive.  Phalen's positive.  On examination of the right hand, there is tenderness on palpation of the ulnar aspect of the wrist.  This area  of tenderness is boggy and swollen when compared to the left.  No snapping, crepitus, nor tendon subluxation on passive ranging.  The tenderness increases with resisted wrist extension.  Radiocarpal joint loading is nontender.  Loading of the DRUJ is nontender.    ASSESSMENT: Right extensor carpi ulnaris tendinitis and left carpal tunnel syndrome.    PLAN: I recommended conservative management for his right extensor carpi ulnaris tendinitis pain.  Referral was given for ultrasound-guided steroid injection to the ECU at the wrist.  We will obtain a nerve conduction study of his left upper extremity to confirm the diagnosis of left carpal tunnel syndrome.  I will have a discussion with him either face-to-face or over the telephone regarding the results.    Total time spent with patient was 30 min of which greater than 50% was in counseling.    Answers for HPI/ROS submitted by the patient on 12/7/2020   General Symptoms: No  Skin Symptoms: No  HENT Symptoms: No  EYE SYMPTOMS: No  HEART SYMPTOMS: No  LUNG SYMPTOMS: No  INTESTINAL SYMPTOMS: No  URINARY SYMPTOMS: No  GYNECOLOGIC SYMPTOMS: No  REPRODUCTIVE SYMPTOMS: No  BREAST SYMPTOMS: No  SKELETAL SYMPTOMS: No  BLOOD SYMPTOMS: No  NERVOUS SYSTEM SYMPTOMS: No  MENTAL HEALTH SYMPTOMS: No

## 2020-12-08 NOTE — TELEPHONE ENCOUNTER
Diagnoses and all orders for this visit:    Carpal tunnel syndrome of left wrist    Right wrist tendonitis  -     ibuprofen (ADVIL/MOTRIN) 600 MG tablet; Take 1 tablet (600 mg) by mouth 3 times daily (with meals) for 21 days      Appreciate cares from Dr. Montenegro. Pt on lexapro and safe to have NSAID dose as above. Please see Dr. Montenegro note 12/8/2020  for further.    Emerson Machado DO, MA  Pronouns: he/him/his    [facility]/ [facility]    Department of Family Medicine and [facility]

## 2020-12-08 NOTE — NURSING NOTE
Reason For Visit:   Chief Complaint   Patient presents with     Consult For     Right carpal tunnel syndrome       Primary MD: Emerson Machado    ?  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  Work status?  Full time.  Date of injury: NA  Type of injury: NA.  Date of surgery: NA  Type of surgery: NA.  Smoker: Yes  Request smoking cessation information: No      There were no vitals taken for this visit.      Pain Assessment  Patient Currently in Pain: Yes  0-10 Pain Scale: 6(Increased pain with activity)  Primary Pain Location: Wrist(Right)               QuickDASH Assessment  No flowsheet data found.       Allergies   Allergen Reactions     Nickel Itching     Seroquel [Quetiapine] Other (See Comments)     Restless leg syndrom       Monica Razo, ATC

## 2020-12-08 NOTE — LETTER
"    12/8/2020         RE: Jesus Hurd  [address] [place] [address]        Dear Colleague,    Thank you for referring your patient, Jesus Hurd, to the [facility] [place]. Please see a copy of my visit note below.    REFERRING PROVIDER: Emerson Machado    REASON FOR CONSULTATION: Right wrist pain and swelling, left hand numbness.    HPI: Patient is a 40-year-old right-hand-dominant man referred to me by Dr. Emerson Machado for evaluation and possible surgical management of right wrist pain and swelling as well as left hand numbness.  Patient endorses a 4 to 5-month history of right wrist pain.  Denies any trauma.  Has tried splinting and hand therapy.  Despite this, he continues to have pain.  As for the left hand, patient has had symptoms of what he calls thumb tendinitis.  He has had numbness in the thumb, index, long, ring finger distribution with activity.  He often wakes up with this numbness and sharp pain that is referred proximally.  Denies any weakness.  He has tried thumb spica splinting which helped.  He has tried over-the-counter pain medications but this did not help.    MEDS:   Prior to Admission medications    Medication Sig Start Date End Date Taking? Authorizing Provider   atorvastatin (LIPITOR) 40 MG tablet Take 1 tablet (40 mg) by mouth daily 10/1/20  Yes Emerson Machado,    buPROPion (WELLBUTRIN XL) 150 MG 24 hr tablet Take 150 mg by mouth daily Take with 300mg tab for dose of 450 mg daily 11/14/19  Yes Reported, Patient   buPROPion (WELLBUTRIN XL) 300 MG 24 hr tablet Take 300 mg by mouth daily 7/18/19  Yes Reported, Patient   escitalopram (LEXAPRO) 5 MG tablet TK 1 T PO QD 6/18/19  Yes Reported, Patient   gabapentin (NEURONTIN) 300 MG capsule Take 3 capsules (900 mg) by mouth At Bedtime AND 2 capsules (600 mg) every morning. 10/1/20  Yes Emerson Machado DO   needle, disp, 18G X 1\" MISC Use to draw up hormones once weekly 10/1/20  Yes Emerson Machado, DO " "  Needle, Disp, 25G X 1-1/2\" MISC Use once weekly for administering hormone IM 10/1/20  Yes Emerson Machado DO   syringe, disposable, 1 ML MISC Use once weekly to draw up hormones 10/1/20  Yes Emerson Machado DO   testosterone cypionate (DEPOTESTOSTERONE) 200 MG/ML injection Inject 0.25 mLs (50 mg) into the muscle once a week 10/1/20  Yes Emerson Machado DO   vitamin D3 (CHOLECALCIFEROL) 50 mcg (2000 units) tablet Take 1 tablet (50 mcg) by mouth daily 7/7/20  Yes Nila Blount MD   estradiol (ESTRACE) 0.1 MG/GM vaginal cream Place 1 g vaginally daily For 10-14 days leading up to PAP 10/1/20   Emerson Machado DO       ALLERGIES:   Allergies   Allergen Reactions     Nickel Itching     Seroquel [Quetiapine] Other (See Comments)     Restless leg syndrom       PMH:   Past Medical History:   Diagnosis Date     Arthritis      Depressive disorder        PSH:   Past Surgical History:   Procedure Laterality Date     BREAST SURGERY       ENT SURGERY  10/24/2017    Plastic Nose Repair     TRANSGENDER MASTECTOMY Bilateral 1/8/2020    Procedure: Bilateral Simple Mastectomy, with Nipple Grafts. OnQ;  Surgeon: Sheree Bear MD;  Location: [facility]       SH:   Social History     Tobacco Use     Smoking status: Light Tobacco Smoker     Packs/day: 0.50     Years: 21.00     Pack years: 10.50     Smokeless tobacco: Never Used     Tobacco comment: using nicotine inhaler which helps   Substance Use Topics     Alcohol use: No   Works as a cook.    FH:   Family History   Problem Relation Age of Onset     Diabetes Mother      Mental Illness Mother      Diabetes Maternal Grandmother      Breast Cancer Maternal Grandmother      Mental Illness Maternal Grandmother      Mental Illness Maternal Grandfather      Mental Illness Brother        ROS: Denies chest pain, shortness of breath, diabetes, MI, CVA, DVT, PE, and bleeding disorders.    PHYSICAL EXAMINATION:   General: No acute distress.  On examination of the left hand, patient has mild " thenar atrophy when compared to the right.  He is however able to oppose the thumb to the index and small fingers.  Tinel's positive.  Phalen's positive.  On examination of the right hand, there is tenderness on palpation of the ulnar aspect of the wrist.  This area of tenderness is boggy and swollen when compared to the left.  No snapping, crepitus, nor tendon subluxation on passive ranging.  The tenderness increases with resisted wrist extension.  Radiocarpal joint loading is nontender.  Loading of the DRUJ is nontender.    ASSESSMENT: Right extensor carpi ulnaris tendinitis and left carpal tunnel syndrome.    PLAN: I recommended conservative management for his right extensor carpi ulnaris tendinitis pain.  Referral was given for ultrasound-guided steroid injection to the ECU at the wrist.  We will obtain a nerve conduction study of his left upper extremity to confirm the diagnosis of left carpal tunnel syndrome.  I will have a discussion with him either face-to-face or over the telephone regarding the results.    Total time spent with patient was 30 min of which greater than 50% was in counseling.    Answers for HPI/ROS submitted by the patient on 12/7/2020   General Symptoms: No  Skin Symptoms: No  HENT Symptoms: No  EYE SYMPTOMS: No  HEART SYMPTOMS: No  LUNG SYMPTOMS: No  INTESTINAL SYMPTOMS: No  URINARY SYMPTOMS: No  GYNECOLOGIC SYMPTOMS: No  REPRODUCTIVE SYMPTOMS: No  BREAST SYMPTOMS: No  SKELETAL SYMPTOMS: No  BLOOD SYMPTOMS: No  NERVOUS SYSTEM SYMPTOMS: No  MENTAL HEALTH SYMPTOMS: No

## 2020-12-11 ENCOUNTER — OFFICE VISIT (OUTPATIENT)
Dept: ORTHOPEDICS | Facility: CLINIC | Age: 40
End: 2020-12-11
Attending: PLASTIC SURGERY
Payer: COMMERCIAL

## 2020-12-11 VITALS — HEIGHT: 64 IN | BODY MASS INDEX: 33.97 KG/M2 | WEIGHT: 199 LBS

## 2020-12-11 DIAGNOSIS — M77.8 RIGHT WRIST TENDINITIS: Primary | ICD-10-CM

## 2020-12-11 DIAGNOSIS — M25.531 RIGHT WRIST PAIN: ICD-10-CM

## 2020-12-11 PROCEDURE — 76942 ECHO GUIDE FOR BIOPSY: CPT | Performed by: FAMILY MEDICINE

## 2020-12-11 PROCEDURE — 99207 PR DROP WITH A PROCEDURE: CPT | Performed by: FAMILY MEDICINE

## 2020-12-11 RX ADMIN — LIDOCAINE HYDROCHLORIDE 2 ML: 10 INJECTION, SOLUTION EPIDURAL; INFILTRATION; INTRACAUDAL; PERINEURAL at 15:56

## 2020-12-11 RX ADMIN — METHYLPREDNISOLONE ACETATE 40 MG: 40 INJECTION, SUSPENSION INTRA-ARTICULAR; INTRALESIONAL; INTRAMUSCULAR; SOFT TISSUE at 15:56

## 2020-12-11 ASSESSMENT — MIFFLIN-ST. JEOR: SCORE: 1567.66

## 2020-12-11 NOTE — LETTER
"  2020      RE: Jesus Hurd  [address] [place] [address]       Hand / Upper Extremity Injection: R ECU    Date/Time: 2020 3:56 PM  Performed by: Neo Paul DO  Authorized by: Neo Paul DO     Indications:  Pain  Needle Size:  25 G  Guidance: ultrasound    Approach:  Medial   Condition comment:  ECU    Medications:  40 mg methylPREDNISolone 40 MG/ML; 2 mL lidocaine (PF) 1 %  Outcome:  Tolerated well, no immediate complications  Procedure discussed: discussed risks, benefits, and alternatives    Consent Given by:  Patient  Timeout: timeout called immediately prior to procedure    Prep: patient was prepped and draped in usual sterile fashion            PROCEDURE ENCOUNTER    [facility]  Orthopedics  Neo Paul DO  2020     Name: Jesus Hurd  MRN: [id number]  Age: 40 year old  : 1980    Referring provider:  Dr. Theo Montenegro MD  Diagnosis: ECU Tendinosis    Wrist Injection, ECU tendon sheath - Ultrasound Guided  The patient was informed of the risks and the benefits of the procedure and a written consent was signed.  The patient s right wrist was prepped with chlorhexidine in sterile fashion.   40 mg of methylprednisolone suspension was drawn up into a 3 mL syringe with 2.0 mL of 1% lidocaine.  Injection was performed using sterile technique.  Under ultrasound guidance a 1.5\" 22-gauge needle was used to enter the ECU tendon, right wrist at level of ulnar styloid.  Needle placement was visualized and documented with ultrasound.  Ultrasound visualization required to ensure injection material enters the tendon sheath and not the tendon itself.  Injection performed short axis to the probe.  Injection solution visualized within the tendon sheath.  Images were permanently stored for the patient's record.  There were no complications. The patient tolerated the procedure well. There was negligible bleeding.   The patient was instructed to ice the wrist upon leaving " clinic and refrain from overuse over the next 3 days.   He will wear his wrist brace during the day for activity during the next three days or longer if recommended by treating physician or occupational therapist.  The patient was instructed to call or go to the emergency room with any unusual pain, swelling, redness, or if otherwise concerned.  Neo Paul DO [facility]  Primary Care Sports Medicine  [facility]

## 2020-12-11 NOTE — PROGRESS NOTES
Hand / Upper Extremity Injection: R ECU    Date/Time: 12/11/2020 3:56 PM  Performed by: Neo Paul DO  Authorized by: Neo Paul DO     Indications:  Pain  Needle Size:  25 G  Guidance: ultrasound    Approach:  Medial   Condition comment:  ECU    Medications:  40 mg methylPREDNISolone 40 MG/ML; 2 mL lidocaine (PF) 1 %  Outcome:  Tolerated well, no immediate complications  Procedure discussed: discussed risks, benefits, and alternatives    Consent Given by:  Patient  Timeout: timeout called immediately prior to procedure    Prep: patient was prepped and draped in usual sterile fashion

## 2020-12-11 NOTE — NURSING NOTE
[address] [address]  Dept: [phone]  ______________________________________________________________________________    Patient: Jesus Hurd   : 1980   MRN: [id number]   2020    INVASIVE PROCEDURE SAFETY CHECKLIST    Date: 2020  Procedure: Right wrist ECU injection with depo  Patient Name: Jesus Hurd  MRN: [id number]  YOB: 1980    Action: Complete sections as appropriate. Any discrepancy results in a HARD COPY until resolved.     PRE PROCEDURE:  Patient ID verified with 2 identifiers (name and  or MRN): Yes  Procedure and site verified with patient/designee (when able): Yes  Accurate consent documentation in medical record: Yes  H&P (or appropriate assessment) documented in medical record: Yes  H&P must be up to 20 days prior to procedure and updates within 24 hours of procedure as applicable: NA  Relevant diagnostic and radiology test results appropriately labeled and displayed as applicable: Yes  Procedure site(s) marked with provider initials: NA    TIMEOUT:  Time-Out performed immediately prior to starting procedure, including verbal and active participation of all team members addressing the following:Yes  * Correct patient identify  * Confirmed that the correct side and site are marked  * An accurate procedure consent form  * Agreement on the procedure to be done  * Correct patient position  * Relevant images and results are properly labeled and appropriately displayed  * The need to administer antibiotics or fluids for irrigation purposes during the procedure as applicable   * Safety precautions based on patient history or medication use    DURING PROCEDURE: Verification of correct person, site, and procedures any time the responsibility for care of the patient is transferred to another member of the care team.     The following medication was given:     MEDICATION:  Depo Medrol  40mg  ROUTE: tendon  SITE: right Wrist  DOSE: 1mL  LOT #: [id number]  : ARKeX  EXPIRATION DATE: 08/2021  NDC#: 9819-8761-05   Was there drug waste? No    MEDICATION:  Lidocaine without epinephrine  ROUTE: tendon  SITE: right Wrist  DOSE: 2mL  LOT #: [id number]  : RootsRated  EXPIRATION DATE: 04/2024  NDC#: 37673-329-03   Was there drug waste? Yes  Amount of drug waste (mL): 3.  Reason for waste:  As per MD    Prior to injection, verified patient identity using patient's name and date of birth.  Due to injection administration, patient instructed to remain in clinic for 15 minutes  afterwards, and to report any adverse reaction to me immediately.    Tami Nicole, ATC  December 11, 2020

## 2020-12-11 NOTE — PROGRESS NOTES
"PROCEDURE ENCOUNTER    [facility]  Orthopedics  Neo Paul,   2020     Name: Jesus Hurd  MRN: [id number]  Age: 40 year old  : 1980    Referring provider: Dr. Theo Montenegro MD  Diagnosis: ECU tendinitis of right wrist.    Wrist Injection, Dorsal First Compartment - Ultrasound Guided  The patient was informed of the risks and the benefits of the procedure and a written consent was signed.  The patient s right wrist was prepped with chlorhexidine in sterile fashion.   40 mg of methylprednisolone suspension was drawn up into a 3 mL syringe with 2 mL of 1% lidocaine.  Injection was performed using sterile technique.  Under ultrasound guidance a 1.5\" 25-gauge needle was used to enter the right wrist at the ECU tendon at level of ulnar styloid.  Needle placement was visualized and documented with ultrasound.  Ultrasound visualization required to ensure injection material enters the tendon sheath and not the tendon itself.  Injection performed short axis to the probe.  Injection solution visualized within the tendon sheath.  Images were permanently stored for the patient's record.  There were no complications. The patient tolerated the procedure well. There was negligible bleeding.   The patient was instructed to ice the wrist upon leaving clinic and refrain from overuse over the next 3 days. Use wrist brace.  The patient was instructed to call or go to the emergency room with any unusual pain, swelling, redness, or if otherwise concerned.  Follow up Dr. Moni Paul DO [facility]  Primary Care Sports Medicine  [facility]      "

## 2020-12-11 NOTE — PROGRESS NOTES
"PROCEDURE ENCOUNTER    [facility]  Armando Acevedo DO  2020     Name: Jesus Hurd  MRN: [id number]  Age: 40 year old  : 1980    Referring provider: Moni  Diagnosis: R ECU Tendonopathy      Carpal Tunnel Injection - Ultrasound Guided  The patient was informed of the risks and the benefits of the procedure and a written consent was signed.  The patient s right wrist was prepped with chlorhexidine in sterile fashion.   40 mg of methylprednisolone suspension was drawn up into a 3 mL syringe with 2.0 mL of 1% lidocaine.  Injection was performed using sterile technique.  Under ultrasound guidance a 1.5\" 22-gauge needle was used to enter the right ECU tendon at the medial aspect of the saldaña dorsal wrist.  Needle placement was visualized and documented with ultrasound.  Ultrasound visualization required to ensure injection material enters the perineural sheath and not a vessel or nerve itself.  Injection performed long axis to the probe.  Injection solution visualized surrounding the perineurium.  Images were permanently stored for the patient's record.  There were no complications. The patient tolerated the procedure well. There was negligible bleeding.   The patient was instructed to ice the wrist upon leaving clinic and refrain from overuse over the next 3 days.   The patient was instructed to call or go to the emergency room with any unusual pain, swelling, redness, or if otherwise concerned.    Patient as been seen and discussed with Dr. Paul who was present for the entirety of the procedure.  Armando Acevedo DO  Primary Care Sports Medicine Fellow        "

## 2020-12-12 RX ORDER — METHYLPREDNISOLONE ACETATE 40 MG/ML
40 INJECTION, SUSPENSION INTRA-ARTICULAR; INTRALESIONAL; INTRAMUSCULAR; SOFT TISSUE
Status: SHIPPED | OUTPATIENT
Start: 2020-12-11

## 2020-12-12 RX ORDER — LIDOCAINE HYDROCHLORIDE 10 MG/ML
2 INJECTION, SOLUTION EPIDURAL; INFILTRATION; INTRACAUDAL; PERINEURAL
Status: SHIPPED | OUTPATIENT
Start: 2020-12-11

## 2020-12-12 NOTE — PROGRESS NOTES
"PROCEDURE ENCOUNTER    [facility]  Orthopedics  Neo Paul,   2020     Name: Jesus Hurd  MRN: [id number]  Age: 40 year old  : 1980    Referring provider:  Dr. Theo Montenegro MD  Diagnosis: ECU Tendinosis    Wrist Injection, ECU tendon sheath - Ultrasound Guided  The patient was informed of the risks and the benefits of the procedure and a written consent was signed.  The patient s right wrist was prepped with chlorhexidine in sterile fashion.   40 mg of methylprednisolone suspension was drawn up into a 3 mL syringe with 2.0 mL of 1% lidocaine.  Injection was performed using sterile technique.  Under ultrasound guidance a 1.5\" 22-gauge needle was used to enter the ECU tendon, right wrist at level of ulnar styloid.  Needle placement was visualized and documented with ultrasound.  Ultrasound visualization required to ensure injection material enters the tendon sheath and not the tendon itself.  Injection performed short axis to the probe.  Injection solution visualized within the tendon sheath.  Images were permanently stored for the patient's record.  There were no complications. The patient tolerated the procedure well. There was negligible bleeding.   The patient was instructed to ice the wrist upon leaving clinic and refrain from overuse over the next 3 days.   He will wear his wrist brace during the day for activity during the next three days or longer if recommended by treating physician or occupational therapist.  The patient was instructed to call or go to the emergency room with any unusual pain, swelling, redness, or if otherwise concerned.  Neo Paul DO CAQSM  Primary Care Sports Medicine  [facility]          "

## 2020-12-17 ENCOUNTER — OFFICE VISIT (OUTPATIENT)
Dept: NEUROLOGY | Facility: CLINIC | Age: 40
End: 2020-12-17
Attending: PLASTIC SURGERY
Payer: COMMERCIAL

## 2020-12-17 DIAGNOSIS — G56.02 LEFT CARPAL TUNNEL SYNDROME: ICD-10-CM

## 2020-12-17 PROCEDURE — 95886 MUSC TEST DONE W/N TEST COMP: CPT | Performed by: PSYCHIATRY & NEUROLOGY

## 2020-12-17 PROCEDURE — 95909 NRV CNDJ TST 5-6 STUDIES: CPT | Performed by: PSYCHIATRY & NEUROLOGY

## 2020-12-17 NOTE — LETTER
12/17/2020       RE: Jesus Hurd  [address] [place] [address]     Dear Colleague,    Thank you for referring your patient, Jesus Hurd, to the [facility] EMG [facility] [place] [facility]. Please see a copy of my visit note below.      [facility]  Electrodiagnostic Laboratory    Nerve Conduction & EMG Report    Patient:       Jesus Hurd  Patient ID:    [id number]  Gender:        Male  YOB: 1980  Age:           40 Years 2 Months        History & Examination: Mr. Jesus Hurd is a 40-year-old gentleman referred by Dr. Montenegro for evaluation of left carpal tunnel syndrome.      Techniques: Motor conduction studies were done with surface recording electrodes. Sensory conduction studies were performed with surface electrodes, unless indicated otherwise by (n), designating the use of subdermal recording electrodes. Temperature was monitored and recorded throughout the study. Upper extremities were maintained at a temperature of 32 degrees Centigrade or higher.  Lower extremities were maintained at a temperature of 31degrees Centigrade or higher. EMG was done with a concentric needle electrode.        Results: The left median antidromic sensory nerve action potential is reduced in amplitude and its distal conduction velocity is slowed the left ulnar antidromic sensory nerve action potential is normal.  Distal median sensory conduction was compared to distal ulnar sensory conduction in the left arm by the palmar sensory technique.  There was marked disproportionate slowing of left distal median sensory conduction.  The left median motor conduction study revealed a prolonged motor distal latency.  The left ulnar motor conduction study was normal.  Needle exam of the left arm was normal.      Interpretation: The EMG is abnormal.  There is EMG evidence for a left median neuropathy at the wrist (carpal tunnel syndrome).      EMG  Physician: Jose Garcia MD         Sensory NCS      Nerve / Sites Rec. Site Onset Peak Ref. NP Amp Ref. PP Amp Dist Noel Ref. Temp     ms ms ms  V  V  V cm m/s m/s  C   L MEDIAN - Dig II Anti      Wrist Dig II 4.17 5.00  7.3 10.0 16.6 14 33.6 48.0 32.5   L ULNAR - Dig V Anti      Wrist Dig V 2.29 3.33  32.7 8.0 63.8 12.5 54.5 48.0 32.5   L MEDIAN - Ulnar - Palmar      Median Wrist 2.71 3.80 2.40 7.7  8.1 8 29.5  32.4      Ulnar Wrist 1.41 1.88 2.40 14.8  27.2 8 56.9  32.4       Motor NCS      Nerve / Sites Rec. Site Lat Ref. Amp Ref. Rel Amp Dist Noel Ref. Dur. Area Temp.     ms ms mV mV % cm m/s m/s ms %  C   L MEDIAN - APB      Wrist APB 5.83 4.40 7.4 5.0 100 8   6.82 100 32.5      Elbow APB 9.58  7.1  95.1 20 53.3 48.0 6.82 96.2 32.4   L ULNAR - ADM      Wrist ADM 3.02 3.50 15.2 5.0 100 8   6.61 100 32.5      B.Elbow ADM 5.94  14.8  97.3 18 61.7 48.0 6.77 90.7 32.4      A.Elbow ADM 7.45  14.8  96.9 10 66.2 48.0 6.67 95.2 32.4       Needle EMG (W)      EMG Summary Table     Spontaneous Recruitment MUAP    IA Fib PSW Fasc H.F. Pattern Amp Dur. PPP   L. FIRST D INTEROSS N None None None None N N N N   L. EXT INDICIS N None None None None N N N N   L. FLEX CARPI RAD N None None None None N N N N   L. BICEPS N None None None None N N N N   L. TRICEPS N None None None None N N N N                   Again, thank you for allowing me to participate in the care of your patient.      Sincerely,    Jose Garcia MD

## 2020-12-17 NOTE — PROGRESS NOTES
[facility]  Electrodiagnostic Laboratory    Nerve Conduction & EMG Report          Patient:       Jesus Hurd  Patient ID:    [id number]  Gender:        Male  YOB: 1980  Age:           40 Years 2 Months        History & Examination: Mr. Jesus Hurd is a 40-year-old gentleman referred by Dr. Montenegro for evaluation of left carpal tunnel syndrome.      Techniques: Motor conduction studies were done with surface recording electrodes. Sensory conduction studies were performed with surface electrodes, unless indicated otherwise by (n), designating the use of subdermal recording electrodes. Temperature was monitored and recorded throughout the study. Upper extremities were maintained at a temperature of 32 degrees Centigrade or higher.  Lower extremities were maintained at a temperature of 31degrees Centigrade or higher. EMG was done with a concentric needle electrode.        Results: The left median antidromic sensory nerve action potential is reduced in amplitude and its distal conduction velocity is slowed the left ulnar antidromic sensory nerve action potential is normal.  Distal median sensory conduction was compared to distal ulnar sensory conduction in the left arm by the palmar sensory technique.  There was marked disproportionate slowing of left distal median sensory conduction.  The left median motor conduction study revealed a prolonged motor distal latency.  The left ulnar motor conduction study was normal.  Needle exam of the left arm was normal.      Interpretation: The EMG is abnormal.  There is EMG evidence for a left median neuropathy at the wrist (carpal tunnel syndrome).      EMG Physician: Jose Garcia MD         Sensory NCS      Nerve / Sites Rec. Site Onset Peak Ref. NP Amp Ref. PP Amp Dist Noel Ref. Temp     ms ms ms  V  V  V cm m/s m/s  C   L MEDIAN - Dig II Anti      Wrist Dig II 4.17 5.00  7.3 10.0 16.6 14 33.6 48.0 32.5   L ULNAR - Dig V Anti      Wrist Dig V  2.29 3.33  32.7 8.0 63.8 12.5 54.5 48.0 32.5   L MEDIAN - Ulnar - Palmar      Median Wrist 2.71 3.80 2.40 7.7  8.1 8 29.5  32.4      Ulnar Wrist 1.41 1.88 2.40 14.8  27.2 8 56.9  32.4       Motor NCS      Nerve / Sites Rec. Site Lat Ref. Amp Ref. Rel Amp Dist Noel Ref. Dur. Area Temp.     ms ms mV mV % cm m/s m/s ms %  C   L MEDIAN - APB      Wrist APB 5.83 4.40 7.4 5.0 100 8   6.82 100 32.5      Elbow APB 9.58  7.1  95.1 20 53.3 48.0 6.82 96.2 32.4   L ULNAR - ADM      Wrist ADM 3.02 3.50 15.2 5.0 100 8   6.61 100 32.5      B.Elbow ADM 5.94  14.8  97.3 18 61.7 48.0 6.77 90.7 32.4      A.Elbow ADM 7.45  14.8  96.9 10 66.2 48.0 6.67 95.2 32.4       Needle EMG (W)      EMG Summary Table     Spontaneous Recruitment MUAP    IA Fib PSW Fasc H.F. Pattern Amp Dur. PPP   L. FIRST D INTEROSS N None None None None N N N N   L. EXT INDICIS N None None None None N N N N   L. FLEX CARPI RAD N None None None None N N N N   L. BICEPS N None None None None N N N N   L. TRICEPS N None None None None N N N N

## 2020-12-18 ENCOUNTER — MYC MEDICAL ADVICE (OUTPATIENT)
Dept: FAMILY MEDICINE | Facility: CLINIC | Age: 40
End: 2020-12-18

## 2020-12-29 DIAGNOSIS — M77.8 RIGHT WRIST TENDONITIS: ICD-10-CM

## 2020-12-29 RX ORDER — IBUPROFEN 600 MG/1
600 TABLET, FILM COATED ORAL EVERY 8 HOURS PRN
Qty: 50 TABLET | Refills: 1 | Status: SHIPPED | OUTPATIENT
Start: 2020-12-29 | End: 2021-08-25

## 2020-12-29 NOTE — TELEPHONE ENCOUNTER
"Request for medication refill:  ibuprofen (ADVIL/MOTRIN) 600 MG tablet  - Medication shows to be ending 12/29/20    Providers if patient needs an appointment and you are willing to give a one month supply please refill for one month and  send a letter/MyChart using \".SMILLIMITEDREFILL\" .smillimited and route chart to \"P [facility] \" (Giving one month refill in non controlled medications is strongly recommended before denial)    If refill has been denied, meaning absolutely no refills without visit, please complete the smart phrase \".smirxrefuse\" and route it to the \"P SMI MED REFILLS\"  pool to inform the patient and the pharmacy.    Mateusz Rausch MA        "

## 2021-01-05 ENCOUNTER — TELEPHONE (OUTPATIENT)
Dept: ORTHOPEDICS | Facility: CLINIC | Age: 41
End: 2021-01-05

## 2021-01-05 NOTE — TELEPHONE ENCOUNTER
[facility] Call Center    Phone Message    May a detailed message be left on voicemail: yes     Reason for Call: Other: Missed telephone appointment today. I'm unable to pull up schedule for telephone visit for Dr. Montenegro. I also asked some fellow co-workers and they weren't able to pull it up either. Patient will be off of work this Thursday 01/07 if all possible.     Action Taken: Message routed to:  [facility] ([facility] Orthopedic    Travel Screening: Not Applicable

## 2021-01-05 NOTE — TELEPHONE ENCOUNTER
Call to patient went straight to [facility] again. I let the patient know that we scheduled an appointment for him on 1/19/2020 at 10:15 am. This was the very soonest available appointment we could offer. Please let patient know of this info and the appointment if they call back.

## 2021-01-05 NOTE — TELEPHONE ENCOUNTER
Attempted 3 calls to patient to check in for telephone appointment with Dr. Montenegro. Call goes straight to voicemail each time. Please reschedule patient if they call back.

## 2021-01-15 ENCOUNTER — HEALTH MAINTENANCE LETTER (OUTPATIENT)
Age: 41
End: 2021-01-15

## 2021-01-19 ENCOUNTER — TELEPHONE (OUTPATIENT)
Dept: ORTHOPEDICS | Facility: CLINIC | Age: 41
End: 2021-01-19

## 2021-01-19 ENCOUNTER — VIRTUAL VISIT (OUTPATIENT)
Dept: ORTHOPEDICS | Facility: CLINIC | Age: 41
End: 2021-01-19
Payer: COMMERCIAL

## 2021-01-19 DIAGNOSIS — G56.02 CARPAL TUNNEL SYNDROME OF LEFT WRIST: Primary | ICD-10-CM

## 2021-01-19 PROCEDURE — 99443 PR PHYSICIAN TELEPHONE EVALUATION 21-30 MIN: CPT | Mod: 95 | Performed by: PLASTIC SURGERY

## 2021-01-19 NOTE — LETTER
1/19/2021         RE: Jesus Hurd  [address] [place] [address]        Dear Colleague,    Thank you for referring your patient, Jesus Hurd, to the [facility] [place]. Please see a copy of my visit note below.    Patient returns for a follow-up telephone visit to discuss nerve conduction study results.    INTERVAL HISTORY: Underwent ECU steroid injection.  Pain is gone.  Continues to have swelling.  Continues to have left carpal tunnel syndrome symptoms.    PHYSICAL EXAMINATION:  Deferred.    December 17, 2020 nerve conduction studies:  Results: The left median antidromic sensory nerve action potential is reduced in amplitude and its distal conduction velocity is slowed the left ulnar antidromic sensory nerve action potential is normal.  Distal median sensory conduction was compared to distal ulnar sensory conduction in the left arm by the palmar sensory technique.  There was marked disproportionate slowing of left distal median sensory conduction.  The left median motor conduction study revealed a prolonged motor distal latency.  The left ulnar motor conduction study was normal.  Needle exam of the left arm was normal.     Interpretation: The EMG is abnormal.  There is EMG evidence for a left median neuropathy at the wrist (carpal tunnel syndrome).    ASSESSMENT: Left carpal tunnel syndrome.  ECU tendinitis resolved.    PLAN: Discussed open transverse carpal ligament release as an outpatient under local only.  This will take 30 minutes.  I explained the risks to include bleeding, infection, injury to surrounding, pillar pain, chronic pain, chronic stiffness, wound healing difficulties, need for revision surgery.  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  Suture removal postop 2 to 3 weeks out.  Patient accepts the associated risks and  wishes to proceed.    Total time spent with patient was 20 min of which greater than 50% was in counseling.        Theo Montenegro MD

## 2021-01-19 NOTE — TELEPHONE ENCOUNTER
Left 2nd voicemail to check patient in for telephone appointment. We will send a [email or web address] message notifying him of this. Call goes straight to voicemail each time.

## 2021-01-19 NOTE — PROGRESS NOTES
Patient returns for a follow-up telephone visit to discuss nerve conduction study results.    INTERVAL HISTORY: Underwent ECU steroid injection.  Pain is gone.  Continues to have swelling.  Continues to have left carpal tunnel syndrome symptoms.    PHYSICAL EXAMINATION:  Deferred.    December 17, 2020 nerve conduction studies:  Results: The left median antidromic sensory nerve action potential is reduced in amplitude and its distal conduction velocity is slowed the left ulnar antidromic sensory nerve action potential is normal.  Distal median sensory conduction was compared to distal ulnar sensory conduction in the left arm by the palmar sensory technique.  There was marked disproportionate slowing of left distal median sensory conduction.  The left median motor conduction study revealed a prolonged motor distal latency.  The left ulnar motor conduction study was normal.  Needle exam of the left arm was normal.     Interpretation: The EMG is abnormal.  There is EMG evidence for a left median neuropathy at the wrist (carpal tunnel syndrome).    ASSESSMENT: Left carpal tunnel syndrome.  ECU tendinitis resolved.    PLAN: Discussed open transverse carpal ligament release as an outpatient under local only.  This will take 30 minutes.  I explained the risks to include bleeding, infection, injury to surrounding, pillar pain, chronic pain, chronic stiffness, wound healing difficulties, need for revision surgery.  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  Suture removal postop 2 to 3 weeks out.  Patient accepts the associated risks and wishes to proceed.    Total time spent with patient was 20 min of which greater than 50% was in counseling.

## 2021-01-19 NOTE — TELEPHONE ENCOUNTER
Called and LVM stating that we are calling to check him in for his telephone visit and to please natasha us back at [phone].

## 2021-01-19 NOTE — NURSING NOTE
Reason For Visit:   Chief Complaint   Patient presents with     RECHECK     discuss EMG and injection results        There were no vitals taken for this visit.    Pain Assessment  Patient Currently in Pain: Unable to assess    Leanne Field ATC

## 2021-01-20 ENCOUNTER — DOCUMENTATION ONLY (OUTPATIENT)
Dept: PLASTIC SURGERY | Facility: CLINIC | Age: 41
End: 2021-01-20

## 2021-01-20 NOTE — PROGRESS NOTES
I contacted the patient via [employer] and spoke with the patient  to confirm the scheduled dates and provide the following information:     Surgery is scheduled with Dr. Montenegro on 2/5 at [facility].  Scheduled per surgeon.      COVID-19 test scheduled for 2/3    H&P: to be completed by PCP.  POST-OP: 2/16    They are aware to arrive at 5:45 AM but will receive a call 1-2 days prior to surgery from a pre-op nurse with exact arrival and start time. Patient is aware that surgery times often change and their arrival time may be different than what is currently scheduled.    The surgery packet was provided via [employer].

## 2021-01-24 DIAGNOSIS — Z11.59 ENCOUNTER FOR SCREENING FOR OTHER VIRAL DISEASES: Primary | ICD-10-CM

## 2021-01-28 ENCOUNTER — TELEPHONE (OUTPATIENT)
Dept: ORTHOPEDICS | Facility: CLINIC | Age: 41
End: 2021-01-28

## 2021-01-28 ENCOUNTER — OFFICE VISIT (OUTPATIENT)
Dept: FAMILY MEDICINE | Facility: CLINIC | Age: 41
End: 2021-01-28
Payer: COMMERCIAL

## 2021-01-28 VITALS
HEIGHT: 67 IN | OXYGEN SATURATION: 98 % | BODY MASS INDEX: 31.58 KG/M2 | TEMPERATURE: 98 F | HEART RATE: 97 BPM | SYSTOLIC BLOOD PRESSURE: 131 MMHG | WEIGHT: 201.2 LBS | RESPIRATION RATE: 18 BRPM | DIASTOLIC BLOOD PRESSURE: 87 MMHG

## 2021-01-28 DIAGNOSIS — G56.02 CARPAL TUNNEL SYNDROME OF LEFT WRIST: ICD-10-CM

## 2021-01-28 DIAGNOSIS — Z01.818 PREOP GENERAL PHYSICAL EXAM: Primary | ICD-10-CM

## 2021-01-28 DIAGNOSIS — F41.9 ANXIETY: ICD-10-CM

## 2021-01-28 DIAGNOSIS — Z72.0 TOBACCO USE: ICD-10-CM

## 2021-01-28 PROCEDURE — 99213 OFFICE O/P EST LOW 20 MIN: CPT | Mod: GC | Performed by: STUDENT IN AN ORGANIZED HEALTH CARE EDUCATION/TRAINING PROGRAM

## 2021-01-28 RX ORDER — GABAPENTIN 100 MG/1
100 CAPSULE ORAL PRN
COMMUNITY
Start: 2021-01-28 | End: 2023-08-01

## 2021-01-28 ASSESSMENT — MIFFLIN-ST. JEOR: SCORE: 1617.88

## 2021-01-28 NOTE — PROGRESS NOTES
Preceptor Attestation:    Patient seen and evaluated in person. I discussed the patient with the resident. I have verified the content of the note, which accurately reflects my assessment of the patient and the plan of care.   Supervising Physician:  Emerson Machado DO.

## 2021-01-28 NOTE — PROGRESS NOTES
[address]  [address] [place] [address]  Phone: [phone]  Fax: [phone]  Primary Provider: Emerson Machado  Pre-op Performing Provider: JOSE C ONEAL    PREOPERATIVE EVALUATION:  Today's date: 1/28/2021    Jesus Hurd is a 40 year old adult who presents for a preoperative evaluation.    Surgical Information:   Surgery/Procedure: L carpal tunnel   Surgery Location: [address] [place] [address]  Surgeon: Dr. Montenegro  Surgery Date: 2/5/21  Time of Surgery: 5:45 am  Where patient plans to recover: At home with family  Fax number for surgical facility: Note does not need to be faxed, will be available electronically in Epic.    Type of Anesthesia Anticipated: local planned but cleared for higher sedation if needed    Subjective     HPI related to upcoming procedure:   Chronic L carpal tunnel pain. Failed conservative treatment.    Preop Questions 1/21/2021   1. Have you ever had a heart attack or stroke? No   2. Have you ever had surgery on your heart or blood vessels, such as a stent placement, a coronary artery bypass, or surgery on an artery in your head, neck, heart, or legs? No   3. Do you have chest pain with activity? No   4. Do you have a history of  heart failure? No   5. Do you currently have a cold, bronchitis or symptoms of other infection? No   6. Do you have a cough, shortness of breath, or wheezing? No   7. Do you or anyone in your family have previous history of blood clots? No   8. Do you or does anyone in your family have a serious bleeding problem such as prolonged bleeding following surgeries or cuts? No   9. Have you ever had problems with anemia or been told to take iron pills? No   10. Have you had any abnormal blood loss such as black, tarry or bloody stools, or abnormal vaginal bleeding? No   11. Have you ever had a blood transfusion? No   12. Are you willing to have a blood transfusion if it is medically needed before, during, or after your  surgery? Yes   13. Have you or any of your relatives ever had problems with anesthesia? No   14. Do you have sleep apnea, excessive snoring or daytime drowsiness? No   15. Do you have any artifical heart valves or other implanted medical devices like a pacemaker, defibrillator, or continuous glucose monitor? No   16. Do you have artificial joints? No   17. Are you allergic to latex? No   18. Is there any chance that you may be pregnant? No       Health Care Directive:  Patient does not have a Health Care Directive or Living Will: Discussed advance care planning with patient; however, patient declined at this time.    Preoperative Review of :   reviewed - controlled substances reflected in medication list.    Status of Chronic Conditions:  See problem list for active medical problems.  Problems all longstanding and stable, except as noted/documented.  See ROS for pertinent symptoms related to these conditions.    Review of Systems  CONSTITUTIONAL: NEGATIVE for fever, chills, change in weight  INTEGUMENTARY/SKIN: NEGATIVE for worrisome rashes, moles or lesions  EYES: NEGATIVE for vision changes or irritation  ENT/MOUTH: NEGATIVE for ear, mouth and throat problems  RESP: NEGATIVE for significant cough or SOB  CV: NEGATIVE for chest pain, palpitations or peripheral edema  GI: NEGATIVE for nausea, abdominal pain, heartburn, or change in bowel habits  : NEGATIVE for frequency, dysuria, or hematuria  MUSCULOSKELETAL: NEGATIVE for significant arthralgias or myalgia  NEURO: NEGATIVE for weakness, dizziness or paresthesias  ENDOCRINE: NEGATIVE for temperature intolerance, skin/hair changes  HEME: NEGATIVE for bleeding problems  PSYCHIATRIC: NEGATIVE for changes in mood or affect    Patient Active Problem List    Diagnosis Date Noted     Carpal tunnel syndrome of left wrist 01/19/2021     Priority: Medium     Added automatically from request for surgery [id number]       Right wrist pain 09/24/2020     Priority: Medium      S/P bilateral mastectomy 06/23/2020     Priority: Medium     BMI 37.0-37.9, adult 12/10/2019     Priority: Medium     Tobacco use 12/10/2019     Priority: Medium     Gender dysphoria in adult 11/25/2019     Priority: Medium     Added automatically from request for surgery [id number]       Bipolar 1 disorder (H) 06/27/2017     Priority: Medium     Anxiety 06/27/2017     Priority: Medium     PTSD (post-traumatic stress disorder) 06/27/2017     Priority: Medium     H/O: attempted suicide 06/27/2017     Priority: Medium     Methamphetamine abuse in remission (H) 06/27/2017     Priority: Medium     Last use was July 28 2014. Relapse Jan 2020 with last use Jan 8, 2020.       Chondromalacia patellae of right knee 06/22/2017     Priority: Medium      Past Medical History:   Diagnosis Date     Arthritis      Depressive disorder      Past Surgical History:   Procedure Laterality Date     BREAST SURGERY       ENT SURGERY  10/24/2017    Plastic Nose Repair     TRANSGENDER MASTECTOMY Bilateral 1/8/2020    Procedure: Bilateral Simple Mastectomy, with Nipple Grafts. OnQ;  Surgeon: Sheree Bear MD;  Location: [facility] OR     Current Outpatient Medications   Medication Sig Dispense Refill     atorvastatin (LIPITOR) 40 MG tablet Take 1 tablet (40 mg) by mouth daily 90 tablet 1     buPROPion (WELLBUTRIN XL) 150 MG 24 hr tablet Take 150 mg by mouth daily Take with 300mg tab for dose of 450 mg daily  4     buPROPion (WELLBUTRIN XL) 300 MG 24 hr tablet Take 300 mg by mouth daily  1     chlorhexidine (HIBICLENS) 4 % liquid Apply topically daily as needed for wound care 118 mL 0     escitalopram (LEXAPRO) 5 MG tablet TK 1 T PO QD  1     gabapentin (NEURONTIN) 100 MG capsule Take 1 capsule (100 mg) by mouth as needed       gabapentin (NEURONTIN) 300 MG capsule Take 3 capsules (900 mg) by mouth At Bedtime AND 2 capsules (600 mg) every morning.  0     ibuprofen (ADVIL/MOTRIN) 600 MG tablet Take 1 tablet (600 mg) by mouth every 8 hours as  "needed for moderate pain 50 tablet 1     needle, disp, 18G X 1\" MISC Use to draw up hormones once weekly 25 each 3     Needle, Disp, 25G X 1-1/2\" MISC Use once weekly for administering hormone IM 25 each 3     syringe, disposable, 1 ML MISC Use once weekly to draw up hormones 25 each 3     testosterone cypionate (DEPOTESTOSTERONE) 200 MG/ML injection Inject 0.25 mLs (50 mg) into the muscle once a week 4 mL 5     vitamin D3 (CHOLECALCIFEROL) 50 mcg (2000 units) tablet Take 1 tablet (50 mcg) by mouth daily 90 tablet 1       Allergies   Allergen Reactions     Nickel Itching     Seroquel [Quetiapine] Other (See Comments)     Restless leg syndrom        Social History     Tobacco Use     Smoking status: Light Tobacco Smoker     Packs/day: 0.50     Years: 21.00     Pack years: 10.50     Smokeless tobacco: Never Used     Tobacco comment: using nicotine inhaler which helps   Substance Use Topics     Alcohol use: No     Family History   Problem Relation Age of Onset     Diabetes Mother      Mental Illness Mother      Diabetes Maternal Grandmother      Breast Cancer Maternal Grandmother      Mental Illness Maternal Grandmother      Mental Illness Maternal Grandfather      Mental Illness Brother      History   Drug Use No     Comment: sober since 2014         Objective     /87   Pulse 97   Temp 98  F (36.7  C) (Oral)   Resp 18   Ht 1.69 m (5' 6.54\")   Wt 91.3 kg (201 lb 3.2 oz)   SpO2 98%   BMI 31.95 kg/m      Physical Exam    GENERAL APPEARANCE: healthy, alert and no distress     EYES: EOMI,  PERRL     HENT: ear canals and TM's normal and nose and mouth without ulcers or lesions     NECK: no adenopathy, no asymmetry, masses, or scars and thyroid normal to palpation     RESP: lungs clear to auscultation - no rales, rhonchi or wheezes     CV: regular rates and rhythm, normal S1 S2, no S3 or S4 and no murmur, click or rub     ABDOMEN:  soft, nontender, no HSM or masses and bowel sounds normal     MS: extremities " normal- no gross deformities noted, no evidence of inflammation in joints, FROM in all extremities. L wrist tenderness & swelling.     SKIN: no suspicious lesions or rashes     NEURO: Normal strength and tone, sensory exam grossly normal, mentation intact and speech normal     PSYCH: mentation appears normal. and affect normal/bright     LYMPHATICS: No cervical adenopathy    Recent Labs   Lab Test 10/01/20  1057 06/23/20  1705   HGB 17.2* 15.3   PLT  --  347   .1 137   POTASSIUM 3.8 3.8   CR 0.8 0.91        Diagnostics:  No labs were ordered during this visit.   No EKG required, no history of coronary heart disease, significant arrhythmia, peripheral arterial disease or other structural heart disease.    Revised Cardiac Risk Index (RCRI):  The patient has the following serious cardiovascular risks for perioperative complications:   - No serious cardiac risks = 0 points     RCRI Interpretation: 0 points: Class I (very low risk - 0.4% complication rate)       Assessment & Plan     Preop general physical exam  Carpal tunnel syndrome of left wrist  Tobacco use  The proposed surgical procedure is considered LOW risk. RCRI score of 0 points. Therefore he does not require an EKG or lab testing today. Pt has recently started smoking tobacco again, 1/2 pack/day, which puts him at risk for surgical site infections after surgery. Advised to quit. Ok to take all medications day of surgery. Hold ibuprofen at least 1 day before surgery. Hibiclens sent to patient's pharmacy.      Risks and Recommendations:  The patient has the following additional risks and recommendations for perioperative complications:   - No identified additional risk factors other than previously addressed    Medication Instructions:  Patient is to take all scheduled medications on the day of surgery EXCEPT for modifications listed below:  -ibuprofen hold 1 day before surgery      RECOMMENDATION:  APPROVAL GIVEN to proceed with proposed procedure,  without further diagnostic evaluation.    Signed Electronically by: Herson Alejo DO      Copy of this evaluation report is provided to requesting physician.    Preop [facility] Preop Guidelines    Revised Cardiac Risk Index

## 2021-01-28 NOTE — PATIENT INSTRUCTIONS
Preparing for Your Surgery  Getting started  A nurse will call you to review your health history and instructions. They will give you an arrival time based on your scheduled surgery time.  Please be ready to share the following:    Your doctor's clinic name and phone number    Your medical, surgical and anesthesia history    A list of allergies and sensitivities    A list of medicines, including herbal treatments and over-the-counter drugs    Whether the patient has a legal guardian (ask how to send us the papers in advance)  If you have a child who's having surgery, please ask for a copy of Preparing for Your Child's Surgery.    Preparing for surgery    Within 30 days of surgery: Have a pre-op exam (sometimes called an H&P, or History and Physical). This can be done at a clinic or pre-operative center.  ? If you're having a , you may not need this exam. Talk to your care team    At your pre-op exam, talk to your care team about all medicines you take. If you need to stop any medicines before surgery, ask when to start taking them again.  ? We do this for your safety. Many medicines can make you bleed too much during surgery. Some change how well surgery (anesthesia) drugs work.    Call your insurance company to let them know you're having surgery. (If you don't have insurance, call [phone].)    Call your clinic if there's any change in your health. This includes signs of a cold or flu (sore throat, runny nose, cough, rash, fever). It also includes a scrape or scratch near the surgery site.    If you have questions on the day of surgery, call your hospital or surgery center.  Eating and drinking guidelines  For your safety: Unless your surgeon tells you otherwise, follow the guidelines below.    Eat and drink as usual until 8 hours before surgery. After that, no food or milk.    Drink clear liquids until 2 hours before surgery. These are liquids you can see through, like water, Gatorade and Propel  Water. You may also have black coffee and tea (no cream or milk).    Nothing by mouth within 2 hours of surgery. This includes gum, candy and breath mints.    If you drink, stop drinking alcohol the night before surgery.    If your care team tells you to take medicine on the morning of surgery, it's okay to take it with a sip of water.  Preventing infection    Shower or bathe the night before and morning of your surgery. Follow the instructions your clinic gave you. (If no instructions, use regular soap.)    Don't shave or clip hair near your surgery site. We'll remove the hair if needed.    Don't smoke or vape the morning of surgery. You may chew nicotine gum up to 2 hours before surgery. A nicotine patch is okay.  ? Note: Some surgeries require you to completely quit smoking and nicotine. Check with your surgeon.    Your care team will make every effort to keep you safe from infection. We will:  ? Clean our hands often with soap and water (or an alcohol-based hand rub).  ? Clean the skin at your surgery site with a special soap that kills germs.  ? Give you a special gown to keep you warm. (Cold raises the risk of infection.)  ? Wear special hair covers, masks, gowns and gloves during surgery.  ? Give antibiotic medicine, if prescribed. Not all surgeries need antibiotics.  What to bring on the day of surgery    Photo ID and insurance card    Copy of your health care directive, if you have one    Glasses and hearing aides (bring cases)  ? You can't wear contacts during surgery    Inhaler and eye drops, if you use them (tell us about these when you arrive)    CPAP machine or breathing device, if you use them    A few personal items, if spending the night    If you have . . .  ? A pacemaker or ICD (cardiac defibrillator): Bring the ID card.  ? An implanted stimulator: Bring the remote control.  ? A legal guardian: Bring a copy of the certified (court-stamped) guardianship papers.  Please remove any jewelry, including  body piercings. Leave jewelry and other valuables at home.  If you're going home the day of surgery  Important: If you don't follow the rules below, we must cancel your surgery.     Arrange for someone to drive you home after surgery. You may not drive, take a taxi or take public transportation by yourself (unless you'll have local anesthesia only).    Arrange for a responsible adult to stay with you overnight. If you don't, we may keep you in the hospital overnight, and you may need to pay the costs yourself.  Questions?   If you have any questions for your care team, list them here: _________________________________________________________________________________________________________________________________________________________________________________________________________________________________________________________________________________________________________________________  For informational purposes only. Not to replace the advice of your health care provider. Copyright   2003, 2019 [facility]. All rights reserved. Clinically reviewed by Nya Birch MD. [employer] 985218 - REV 4/20.    Before Your Procedure or Hospital Admission  Testing for COVID-19 (Coronavirus)  Thank you for choosing [facility] for your health care needs. This is a very challenging time for everyone. The World Health Organization and the [facility] have declared the COVID-19 virus a pandemic.   Our goal is to keep you and our team here at [facility] safe and healthy. We've taken several steps to make this happen. For example:    We screen our staff, care teams and patients for COVID-19.    Everyone at [facility] must wear a mask and stay 6 feet apart.    We are limiting hospital and clinic visitors.  Before you come in  All patients must get tested for COVID-19. Your test needs to happen 2 to 4 days before you check in to the hospital or surgery site.   A clinic scheduler will call  "about a week in advance to set up a testing time at one of our labs where we'll take a swab of your nose or throat.  Note: If you go to a clinic or pharmacy like [facility] or [facility] for your test, make sure it's a \"RT-PCR\" test, not a \"rapid\" COVID-19 test. (See Questions and Answers below.)  After the test, please stay at home and stay out of contact with other people. It will be harder for you to recover if you get COVID-19 before your treatment.  Please follow all current safety guidelines, including:    Limit trips outside your home.    Limit the number of people you see.    Always wear a mask outside your home.    Use social distancing. (Stay 6 feet away from others whenever you can.)    Wash your hands often.  If your test shows you have COVID-19  If your test is positive, we'll let you know. A positive test means that you have the virus.   We'll probably have to postpone your admission, surgery or procedure. Your doctor will discuss this with you. After that, we'll let you know what to do and when you can reschedule.   We may need to cancel your treatment on short notice for other reasons, too.  If your test shows you DON'T have COVID-19  Even if your test is negative, you may still get COVID-19. It's rare but, sometimes, the test result is wrong. You could also catch the virus after taking the test.   There's a very small chance that you could catch COVID-19 in the hospital or surgery center. [facility] has taken many steps to prevent this from happening.   Day of your surgery or procedure    Please come wearing a mask or something else that covers both your nose and mouth.    When you arrive, we'll ask you some questions to find out if you have any signs or symptoms of COVID-19.    Ask your care team if you can have visitors. All visitors must wear masks and will be screened for signs of COVID-19.  ? Even if no visitors are allowed, you can still have with you:    Your legal guardian or legal decision " "maker    A parent and one other visitor, if you are younger than 18 years old    A partner and a , if you are in labor  ? We might need to teach you about taking care of yourself after surgery. If so, a visitor can come into the hospital to learn about it, too.  ? The rules for visitors change often, depending on how much the virus is spreading. To learn more, see Visiting a Loved One in the Hospital during the COVID-19 Outbreak.  Please call your care team, hospital or surgery center if you have any questions. We thank you for your understanding and for choosing [facility] for your care.   Questions and Answers  Does it matter where I get tested for COVID-19?  Yes. We urge you to get tested at one of our [facility] COVID-19 testing sites. We process these tests in our lab and can get the results quickly. Your [facility] care team needs to get your results before you check in.  What should I do if I can't get tested at [facility]?  You can get tested somewhere else, but you'll need to take these extra steps:  1. Contact your family doctor or clinic to arrange your test.  2. Take the test within 4 days of your surgery or procedure. We can't accept tests older than 4 days.  3. Make sure your doctor or clinic faxes your results to [facility] at [phone].  If we don't get your results in time, we may have to postpone or cancel your treatment.  Ask if you're getting a \"RT-PCR\" COVID-19 test. It should NOT be a \"rapid\" COVID-19 test. Many drug stores use \"rapid\" tests, but they may not be as accurate. We don't accept the results of \"rapid\" tests.  For informational purposes only. Not to replace the advice of your health care provider. Copyright   2020 [facility] [employer]. All rights reserved. Clinically reviewed by Infection Prevention and the [facility] COVID-19 Clinical Team. [employer] 775507 - REV 10/20.    How to Take Your Medication Before Surgery  - STOP " taking all vitamins and herbal supplements 14 days before surgery.     -CONTINUE: wellbutrin, lexapro, gabapentin, lipitor  -HOLD: ibuprofen at least 1 day before surgery  -Do not take your testosterone shot if it is the day before surgery.

## 2021-01-28 NOTE — TELEPHONE ENCOUNTER
Outreach call placed to patient prior to his surgery with Dr Montenegro on 2-5-21.  He had pre-op exam today and surgery packet was sent electronically by scheduling team. Left RN number if patient had questions prior to procedure. Elva Boateng RN

## 2021-02-03 DIAGNOSIS — Z11.59 ENCOUNTER FOR SCREENING FOR OTHER VIRAL DISEASES: ICD-10-CM

## 2021-02-03 LAB
SARS-COV-2 RNA RESP QL NAA+PROBE: NORMAL
SPECIMEN SOURCE: NORMAL

## 2021-02-03 PROCEDURE — U0005 INFEC AGEN DETEC AMPLI PROBE: HCPCS | Performed by: PLASTIC SURGERY

## 2021-02-03 PROCEDURE — U0003 INFECTIOUS AGENT DETECTION BY NUCLEIC ACID (DNA OR RNA); SEVERE ACUTE RESPIRATORY SYNDROME CORONAVIRUS 2 (SARS-COV-2) (CORONAVIRUS DISEASE [COVID-19]), AMPLIFIED PROBE TECHNIQUE, MAKING USE OF HIGH THROUGHPUT TECHNOLOGIES AS DESCRIBED BY CMS-2020-01-R: HCPCS | Performed by: PLASTIC SURGERY

## 2021-02-04 LAB
LABORATORY COMMENT REPORT: NORMAL
SARS-COV-2 RNA RESP QL NAA+PROBE: NEGATIVE
SPECIMEN SOURCE: NORMAL

## 2021-02-05 ENCOUNTER — HOSPITAL ENCOUNTER (OUTPATIENT)
Facility: AMBULATORY SURGERY CENTER | Age: 41
Discharge: HOME | End: 2021-02-05
Attending: PLASTIC SURGERY | Admitting: PLASTIC SURGERY
Payer: COMMERCIAL

## 2021-02-05 VITALS
WEIGHT: 201 LBS | HEIGHT: 66 IN | RESPIRATION RATE: 16 BRPM | BODY MASS INDEX: 32.3 KG/M2 | TEMPERATURE: 97.4 F | HEART RATE: 83 BPM | OXYGEN SATURATION: 96 % | SYSTOLIC BLOOD PRESSURE: 121 MMHG | DIASTOLIC BLOOD PRESSURE: 83 MMHG

## 2021-02-05 DIAGNOSIS — G56.02 CARPAL TUNNEL SYNDROME OF LEFT WRIST: ICD-10-CM

## 2021-02-05 PROCEDURE — 64721 CARPAL TUNNEL SURGERY: CPT

## 2021-02-05 RX ORDER — LIDOCAINE HYDROCHLORIDE AND EPINEPHRINE 10; 10 MG/ML; UG/ML
10 INJECTION, SOLUTION INFILTRATION; PERINEURAL ONCE
Status: COMPLETED | OUTPATIENT
Start: 2021-02-05 | End: 2021-02-05

## 2021-02-05 RX ORDER — AMOXICILLIN 250 MG
1-2 CAPSULE ORAL 2 TIMES DAILY
Qty: 30 TABLET | Refills: 0 | Status: SHIPPED | OUTPATIENT
Start: 2021-02-05 | End: 2021-11-03

## 2021-02-05 RX ORDER — HYDROXYZINE PAMOATE 25 MG/1
25 CAPSULE ORAL EVERY 6 HOURS PRN
Qty: 30 CAPSULE | Refills: 0 | Status: SHIPPED | OUTPATIENT
Start: 2021-02-05 | End: 2021-04-28

## 2021-02-05 RX ORDER — ACETAMINOPHEN 325 MG/1
650 TABLET ORAL
Status: CANCELLED | OUTPATIENT
Start: 2021-02-05

## 2021-02-05 RX ORDER — HYDROCODONE BITARTRATE AND ACETAMINOPHEN 5; 325 MG/1; MG/1
1-2 TABLET ORAL EVERY 4 HOURS PRN
Qty: 10 TABLET | Refills: 0 | Status: SHIPPED | OUTPATIENT
Start: 2021-02-05 | End: 2021-02-22

## 2021-02-05 RX ORDER — OXYCODONE HYDROCHLORIDE 5 MG/1
5 TABLET ORAL
Status: CANCELLED | OUTPATIENT
Start: 2021-02-05

## 2021-02-05 RX ADMIN — LIDOCAINE HYDROCHLORIDE AND EPINEPHRINE 10 ML: 10; 10 INJECTION, SOLUTION INFILTRATION; PERINEURAL at 06:56

## 2021-02-05 ASSESSMENT — MIFFLIN-ST. JEOR: SCORE: 1608.48

## 2021-02-05 NOTE — DISCHARGE INSTRUCTIONS
"[facility]  Home Care Following Your Procedure  Call a doctor if you have signs of infection (fever, growing tenderness at the surgery site, a large amount of drainage or bleeding, severe pain, foul-smelling drainage, redness, swelling).  Today you received a Marcaine or bupivacaine block to numb the nerves near your surgery site.  This is a block using local anesthetic or \"numbing\" medication injected around the nerves to anesthetize or \"numb\" the area supplied by those nerves.  This block is injected into the muscle layer near your surgical site.  The medication may numb the location where you had surgery for 6-18 hours, but may last up to 24 hours.  If your surgical site is an arm or leg you should be careful with your affected limb, since it is possible to injure your limb without being aware of it due to the numbing.  Until full feeling returns, you should guard against bumping or hitting your limb, and avoid extreme hot or cold temperatures on the skin.  As the block wears off, the feeling will return as a tingling or prickly sensation near your surgical site.  You will experience more discomfort from your incision as the feeling returns.  You may want to take a pain pill (a narcotic or Tylenol if this was prescribed by your surgeon) when you start to experience mild pain before the pain becomes more severe.  If your pain medications do not control your pain you should notifiy your surgeon.    Tylenol/Acetaminophen Consumption  To help encourage the safe use of acetaminophen, the makers of TYLENOL  have lowered the maximum daily dose for single-ingredient Extra Strength TYLENOL  (acetaminophen) products sold in the [place] from 8 pills per day (4,000 mg) to 6 pills per day (3,000 mg). The dosing interval has also changed from 2 pills every 4-6 hours to 2 pills every 6 hours.    If you feel your pain relief is insufficient, you may take Tylenol/Acetaminophen in addition to " your narcotic pain medication.     Be careful not to exceed 3,000 mg of Tylenol/Acetaminophen in a 24 hour period from all sources.    If you are taking extra strength Tylenol/acetaminophen (500 mg), the maximum dose is 6 tablets in 24 hours.    If you are taking regular strength acetaminophen (325 mg), the maximum dose is 9 tablets in 24 hours.  Your doctor is:  Dr. Cm Liu, Orthopaedics: [phone]                                    Or dial [phone] and ask for the resident on call for:  Orthopaedics  For emergency care, call the:  [facility]: [phone] (TTY for hearing impaired: [phone])

## 2021-02-05 NOTE — PROGRESS NOTES
[facility] SURG[facility] OR [place]  [address]  [address] FLO[address]  [place] [address] [phone]    2021    Jesus Hurd  [facility] [place] [address]  [phone] (home)     :  1980    To Whom it May Concern:    Jesus Hurd missed work on 2021 due to left hand surgery. He will be on light duty at work until 21.    Please contact me for any questions or concerns.    Sincerely,  Dr. Montenegro

## 2021-02-05 NOTE — OP NOTE
DATE OF OPERATION: February 5, 2021    PREOPERATIVE DIAGNOSIS: Left carpal tunnel syndrome.    POSTOPERATIVE DIAGNOSIS: Left carpal tunnel syndrome.    PROCEDURES PERFORMED: Left open transverse carpal ligament release.    SURGEON: Theo Montenegro MD    ASSISTANT: None.    ANESTHESIA: Local only.  7 cc of 1% lidocaine with 1-100,000 epinephrine.    ESTIMATED BLOOD LOSS: 5 mL    TOURNIQUET TIME: 0 min    FINDINGS: No atypical findings.    SPECIMENS: None.    COMPLICATIONS: None.    DRAINS: None.    IMPLANTS: None.    INDICATIONS:  Patient is a 40-year-old right-hand-dominant male with nerve conduction study confirmed diagnosis of left carpal tunnel syndrome.  This did not resolve despite conservative treatment.  Risk benefits and alternatives were discussed for open transverse carpal ligament release.  Patient accepted the associated risks and wished to proceed with surgery.    DESCRIPTION OF PROCEDURE:  Informed consent and markings were performed in the preoperative holding area.  A timeout was performed.  The left volar palm and wrist were injected with 7 cc of 1% lidocaine with 1-100,000 epinephrine.  This was allowed to settle in.  Patient was then taken to the operating room and placed in the supine position with the left hand out on a hand table.  All pressure points were well-padded.  The left hand and wrist were then prepped and draped circumferentially in a sterile fashion.  Another timeout was performed.    2 cm distal to the wrist crease, a 2 cm incision was designed along the proximal forearm further naturally occurring palmar crease existing along the ray of the ring finger.  This area was found to be anesthetized.  Incision was made with a 15 blade scalpel and carried deep through the subcutaneous tissue, palmar fascia, and towards the transverse carpal ligament.  This was fully visualized by dissecting bluntly both proximally and distally superficial to this layer.  The transverse carpal ligament was  then divided with a 15 blade scalpel until the carpal tunnel contents were reached.  With the contents of the carpal tunnel protected at all times with a mosquito, it was released first proximally under direct visualization until the antebrachial fascia was released distally.  Then it was released distally under direct visualization until bulging fat was met.  Full release was confirmed.  The median nerve appeared flattened.  The wound was thoroughly irrigated and hemostasis achieved using bipolar cautery.  Incision was closed with 4-0 nylon in an interrupted fashion.  A dry dressing was applied.  All counts were correct at the end of the case.  Patient was then awakened and transferred to the postoperative area in stable condition.    DISPOSITION: Home.

## 2021-02-16 ENCOUNTER — OFFICE VISIT (OUTPATIENT)
Dept: ORTHOPEDICS | Facility: CLINIC | Age: 41
End: 2021-02-16
Payer: COMMERCIAL

## 2021-02-16 DIAGNOSIS — G56.02 CARPAL TUNNEL SYNDROME OF LEFT WRIST: Primary | ICD-10-CM

## 2021-02-16 PROCEDURE — 99024 POSTOP FOLLOW-UP VISIT: CPT | Performed by: PLASTIC SURGERY

## 2021-02-16 NOTE — NURSING NOTE
"Reason For Visit:   Chief Complaint   Patient presents with     RECHECK     Left open carpal tunnel release DOS 2/5/21       Primary MD: Emerson Machado    Age: 40 year old    ?  No      There were no vitals taken for this visit.      Pain Assessment  Patient Currently in Pain: Denies(Only with use)               QuickDASH Assessment  No flowsheet data found.       Current Outpatient Medications   Medication Sig Dispense Refill     atorvastatin (LIPITOR) 40 MG tablet Take 1 tablet (40 mg) by mouth daily 90 tablet 1     buPROPion (WELLBUTRIN XL) 150 MG 24 hr tablet Take 150 mg by mouth daily Take with 300mg tab for dose of 450 mg daily  4     buPROPion (WELLBUTRIN XL) 300 MG 24 hr tablet Take 300 mg by mouth daily  1     escitalopram (LEXAPRO) 5 MG tablet TK 1 T PO QD  1     gabapentin (NEURONTIN) 100 MG capsule Take 1 capsule (100 mg) by mouth as needed       gabapentin (NEURONTIN) 300 MG capsule Take 3 capsules (900 mg) by mouth At Bedtime AND 2 capsules (600 mg) every morning.  0     HYDROcodone-acetaminophen (NORCO) 5-325 MG tablet Take 1-2 tablets by mouth every 4 hours as needed for moderate to severe pain 10 tablet 0     hydrOXYzine (VISTARIL) 25 MG capsule Take 1 capsule (25 mg) by mouth every 6 hours as needed for itching 30 capsule 0     ibuprofen (ADVIL/MOTRIN) 600 MG tablet Take 1 tablet (600 mg) by mouth every 8 hours as needed for moderate pain 50 tablet 1     needle, disp, 18G X 1\" MISC Use to draw up hormones once weekly 25 each 3     Needle, Disp, 25G X 1-1/2\" MISC Use once weekly for administering hormone IM 25 each 3     senna-docusate (SENOKOT-S/PERICOLACE) 8.6-50 MG tablet Take 1-2 tablets by mouth 2 times daily 30 tablet 0     syringe, disposable, 1 ML MISC Use once weekly to draw up hormones 25 each 3     testosterone cypionate (DEPOTESTOSTERONE) 200 MG/ML injection Inject 0.25 mLs (50 mg) into the muscle once a week 4 mL 5     vitamin D3 (CHOLECALCIFEROL) 50 mcg (2000 units) tablet " Take 1 tablet (50 mcg) by mouth daily 90 tablet 1     chlorhexidine (HIBICLENS) 4 % liquid Apply topically daily as needed for wound care 118 mL 0       Allergies   Allergen Reactions     Nickel Itching     Seroquel [Quetiapine] Other (See Comments)     Restless leg syndrom       Monica Razo, ATC

## 2021-02-16 NOTE — PROGRESS NOTES
Patient returns for a postoperative follow-up.    INTERVAL HISTORY: Doing well.  Numbness symptoms have completely resolved since surgery.  Pain is much better 2.  He is just a little stiff.    PHYSICAL EXAMINATION:  Left proximal palm incision healing appropriately.  No sign of infection.  Sutures intact.  Able to make a composite fist and extend all digits.  Able to oppose the thumb to the index finger.  Thenar musculature activate appropriately.  Unable to oppose to the small finger with a lagging distance of about a centimeter.  Right dorsal wrist with minimal swelling.  Full range of motion of the wrist with no pain.    ASSESSMENT: 1.5-week status post left carpal tunnel release.  Doing well.    PLAN: Suture removal today.  Activity as tolerated.  Patient is to continue working on opposition.  If this does not go well, he may be a candidate for hand therapy.  I will see him back in about 6 weeks.    In terms of his right wrist ECU tendinitis, he may be a candidate for a repeat steroid injection if symptoms recur.  However, he is currently symptom-free.  I suggested he continue with activity as tolerated and compression.  Is not a surgical candidate.  Patient may return to see me for this problem as needed.    Total time spent with patient was 15 min of which greater than 50% was in counseling.    Answers for HPI/ROS submitted by the patient on 2/8/2021   General Symptoms: No  Skin Symptoms: No  HENT Symptoms: No  EYE SYMPTOMS: No  HEART SYMPTOMS: No  LUNG SYMPTOMS: No  INTESTINAL SYMPTOMS: No  URINARY SYMPTOMS: No  GYNECOLOGIC SYMPTOMS: No  REPRODUCTIVE SYMPTOMS: No  BREAST SYMPTOMS: No  SKELETAL SYMPTOMS: No  BLOOD SYMPTOMS: No  NERVOUS SYSTEM SYMPTOMS: No  MENTAL HEALTH SYMPTOMS: No

## 2021-02-16 NOTE — LETTER
2/16/2021         RE: Jesus Hurd  [address] [place] [address]        Dear Colleague,    Thank you for referring your patient, Jesus Hurd, to the [facility] [place]. Please see a copy of my visit note below.    Patient returns for a postoperative follow-up.    INTERVAL HISTORY: Doing well.  Numbness symptoms have completely resolved since surgery.  Pain is much better 2.  He is just a little stiff.    PHYSICAL EXAMINATION:  Left proximal palm incision healing appropriately.  No sign of infection.  Sutures intact.  Able to make a composite fist and extend all digits.  Able to oppose the thumb to the index finger.  Thenar musculature activate appropriately.  Unable to oppose to the small finger with a lagging distance of about a centimeter.  Right dorsal wrist with minimal swelling.  Full range of motion of the wrist with no pain.    ASSESSMENT: 1.5-week status post left carpal tunnel release.  Doing well.    PLAN: Suture removal today.  Activity as tolerated.  Patient is to continue working on opposition.  If this does not go well, he may be a candidate for hand therapy.  I will see him back in about 6 weeks.    In terms of his right wrist ECU tendinitis, he may be a candidate for a repeat steroid injection if symptoms recur.  However, he is currently symptom-free.  I suggested he continue with activity as tolerated and compression.  Is not a surgical candidate.  Patient may return to see me for this problem as needed.    Total time spent with patient was 15 min of which greater than 50% was in counseling.    Answers for HPI/ROS submitted by the patient on 2/8/2021   General Symptoms: No  Skin Symptoms: No  HENT Symptoms: No  EYE SYMPTOMS: No  HEART SYMPTOMS: No  LUNG SYMPTOMS: No  INTESTINAL SYMPTOMS: No  URINARY SYMPTOMS: No  GYNECOLOGIC SYMPTOMS: No  REPRODUCTIVE SYMPTOMS: No  BREAST SYMPTOMS: No  SKELETAL SYMPTOMS: No  BLOOD SYMPTOMS: No  NERVOUS SYSTEM SYMPTOMS:  No  MENTAL HEALTH SYMPTOMS: No

## 2021-02-22 ENCOUNTER — OFFICE VISIT (OUTPATIENT)
Dept: FAMILY MEDICINE | Facility: CLINIC | Age: 41
End: 2021-02-22
Payer: COMMERCIAL

## 2021-02-22 VITALS
TEMPERATURE: 98.4 F | HEART RATE: 85 BPM | WEIGHT: 208 LBS | DIASTOLIC BLOOD PRESSURE: 93 MMHG | OXYGEN SATURATION: 98 % | SYSTOLIC BLOOD PRESSURE: 137 MMHG | BODY MASS INDEX: 33.57 KG/M2

## 2021-02-22 DIAGNOSIS — R29.818 SUSPECTED SLEEP APNEA: ICD-10-CM

## 2021-02-22 DIAGNOSIS — Z62.21 CHILD IN FOSTER CARE: ICD-10-CM

## 2021-02-22 DIAGNOSIS — Z00.00 ROUTINE GENERAL MEDICAL EXAMINATION AT A HEALTH CARE FACILITY: Primary | ICD-10-CM

## 2021-02-22 DIAGNOSIS — H53.69 DIMINISHED NIGHT VISION: ICD-10-CM

## 2021-02-22 PROCEDURE — 99395 PREV VISIT EST AGE 18-39: CPT | Performed by: STUDENT IN AN ORGANIZED HEALTH CARE EDUCATION/TRAINING PROGRAM

## 2021-02-22 ASSESSMENT — ANXIETY QUESTIONNAIRES
3. WORRYING TOO MUCH ABOUT DIFFERENT THINGS: SEVERAL DAYS
5. BEING SO RESTLESS THAT IT IS HARD TO SIT STILL: SEVERAL DAYS
1. FEELING NERVOUS, ANXIOUS, OR ON EDGE: SEVERAL DAYS
7. FEELING AFRAID AS IF SOMETHING AWFUL MIGHT HAPPEN: NOT AT ALL
6. BECOMING EASILY ANNOYED OR IRRITABLE: NEARLY EVERY DAY
IF YOU CHECKED OFF ANY PROBLEMS ON THIS QUESTIONNAIRE, HOW DIFFICULT HAVE THESE PROBLEMS MADE IT FOR YOU TO DO YOUR WORK, TAKE CARE OF THINGS AT HOME, OR GET ALONG WITH OTHER PEOPLE: SOMEWHAT DIFFICULT
2. NOT BEING ABLE TO STOP OR CONTROL WORRYING: NOT AT ALL
GAD7 TOTAL SCORE: 7

## 2021-02-22 ASSESSMENT — PATIENT HEALTH QUESTIONNAIRE - PHQ9
SUM OF ALL RESPONSES TO PHQ QUESTIONS 1-9: 10
5. POOR APPETITE OR OVEREATING: SEVERAL DAYS
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
## 2021-02-23 ASSESSMENT — ANXIETY QUESTIONNAIRES: GAD7 TOTAL SCORE: 7

## 2021-02-26 ENCOUNTER — MYC MEDICAL ADVICE (OUTPATIENT)
Dept: FAMILY MEDICINE | Facility: CLINIC | Age: 41
End: 2021-02-26

## 2021-02-26 DIAGNOSIS — R20.0 NUMBNESS AND TINGLING IN RIGHT HAND: ICD-10-CM

## 2021-02-26 DIAGNOSIS — G56.01 CARPAL TUNNEL SYNDROME OF RIGHT WRIST: Primary | ICD-10-CM

## 2021-02-26 DIAGNOSIS — R20.2 NUMBNESS AND TINGLING IN RIGHT HAND: ICD-10-CM

## 2021-02-26 NOTE — TELEPHONE ENCOUNTER
RN please call patient to see if Dr. Montenegro has called or recommended follow up. (Can see coin4cet communication with Dr. Montenegro and see that messages have been routed around, but cannot see content).     Unfortunately, Aditi's office visit would not help. Pt needs to be seen by surgeon as he has recently had surgery.    Emerson Machado, DO

## 2021-02-26 NOTE — TELEPHONE ENCOUNTER
Per chart review Dr. aFn office has reached out to pt with recommendations    Caitlynjana Celeste RN

## 2021-03-01 NOTE — PROGRESS NOTES
Discharge Summary - Hand Therapy    Patient did not return to therapy. He is seeking surgical intervention and steroid injection for present issues. Assume all goals were met to patient's satisfaction. D/C from hand therapy.

## 2021-03-04 ENCOUNTER — OFFICE VISIT (OUTPATIENT)
Dept: OPHTHALMOLOGY | Facility: CLINIC | Age: 41
End: 2021-03-04
Attending: STUDENT IN AN ORGANIZED HEALTH CARE EDUCATION/TRAINING PROGRAM
Payer: COMMERCIAL

## 2021-03-04 DIAGNOSIS — H53.69 DIMINISHED NIGHT VISION: Primary | ICD-10-CM

## 2021-03-04 DIAGNOSIS — H52.4 MYOPIA OF BOTH EYES WITH ASTIGMATISM AND PRESBYOPIA: ICD-10-CM

## 2021-03-04 DIAGNOSIS — H52.203 MYOPIA OF BOTH EYES WITH ASTIGMATISM AND PRESBYOPIA: ICD-10-CM

## 2021-03-04 DIAGNOSIS — H52.13 MYOPIA OF BOTH EYES WITH ASTIGMATISM AND PRESBYOPIA: ICD-10-CM

## 2021-03-04 PROCEDURE — 92004 COMPRE OPH EXAM NEW PT 1/>: CPT | Mod: GC | Performed by: OPHTHALMOLOGY

## 2021-03-04 PROCEDURE — G0463 HOSPITAL OUTPT CLINIC VISIT: HCPCS

## 2021-03-04 PROCEDURE — 92015 DETERMINE REFRACTIVE STATE: CPT

## 2021-03-04 ASSESSMENT — VISUAL ACUITY
OD_SC+: -1
METHOD_MR: RED/GREEN BALANCED BOTH EYES
OS_SC: 20/20
OD_SC: 20/20
OS_SC+: -2
METHOD: SNELLEN - LINEAR

## 2021-03-04 ASSESSMENT — TONOMETRY
OD_IOP_MMHG: 15
IOP_METHOD: TONOPEN
OS_IOP_MMHG: 16

## 2021-03-04 ASSESSMENT — CONF VISUAL FIELD
OS_NORMAL: 1
OD_NORMAL: 1
METHOD: COUNTING FINGERS

## 2021-03-04 ASSESSMENT — SLIT LAMP EXAM - LIDS
COMMENTS: NORMAL
COMMENTS: NORMAL

## 2021-03-04 ASSESSMENT — EXTERNAL EXAM - LEFT EYE: OS_EXAM: NORMAL

## 2021-03-04 ASSESSMENT — REFRACTION_MANIFEST
OD_SPHERE: PLANO
OD_AXIS: 100
OS_CYLINDER: +0.25
OS_SPHERE: -0.75
OS_AXIS: 068
OD_CYLINDER: +0.25

## 2021-03-04 ASSESSMENT — EXTERNAL EXAM - RIGHT EYE: OD_EXAM: NORMAL

## 2021-03-04 ASSESSMENT — CUP TO DISC RATIO
OS_RATIO: 0.2
OD_RATIO: 0.2
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
## 2021-03-04 NOTE — NURSING NOTE
Chief Complaints and History of Present Illnesses   Patient presents with     Vision Changes Ou     Chief Complaint(s) and History of Present Illness(es)     Vision Changes Ou     Laterality: both eyes    Quality: blurred vision    Context: night driving    Course: gradually worsening    Associated symptoms: glare and haloes.  Negative for flashes, floaters and eye pain    Pain scale: 0/10              Comments     Pt complains of decrease in night vision BE x 1 year.  Last eye exam about more than 10 years ago.  Complains of having trouble with glare and halos.  Denies any flashes, floaters, pain, or irritation.  Ocular meds: None    Verónica Wilson OT 9:07 AM March 4, 2021

## 2021-03-08 ENCOUNTER — OFFICE VISIT (OUTPATIENT)
Dept: NEUROLOGY | Facility: CLINIC | Age: 41
End: 2021-03-08
Attending: PLASTIC SURGERY
Payer: COMMERCIAL

## 2021-03-08 DIAGNOSIS — G56.01 CARPAL TUNNEL SYNDROME OF RIGHT WRIST: ICD-10-CM

## 2021-03-08 DIAGNOSIS — R20.2 NUMBNESS AND TINGLING IN RIGHT HAND: ICD-10-CM

## 2021-03-08 DIAGNOSIS — R20.0 NUMBNESS AND TINGLING IN RIGHT HAND: ICD-10-CM

## 2021-03-08 PROCEDURE — 95909 NRV CNDJ TST 5-6 STUDIES: CPT | Performed by: INTERNAL MEDICINE

## 2021-03-08 PROCEDURE — 95885 MUSC TST DONE W/NERV TST LIM: CPT | Performed by: INTERNAL MEDICINE

## 2021-03-08 NOTE — PROGRESS NOTES
[facility]  Electrodiagnostic Laboratory    Nerve Conduction & EMG Report          Patient:       Jesus Hurd  Patient ID:    [id number]  Gender:        Male  YOB: 1980  Age:           40 Years 5 Months      History & Examination:  Patient is a 41 y/o male who presents with right hand numbness and pain. He reports he previously had tendinitis in the right wrist. He received a cortisone shot in December which was helpful for that pain. In February he began to develop pain and numbness in all 5 fingers of the right hand. Symptoms are provoked by using the right hand for different activities. EMG ordered to look for the presence of right upper extremity carpal tunnel syndrome.     Techniques: Motor conduction studies were done with surface recording electrodes. Sensory conduction studies were performed with surface electrodes, unless indicated otherwise by (n), designating the use of subdermal recording electrodes. Temperature was monitored and recorded throughout the study. Upper extremities were maintained at a temperature of 32 degrees Centigrade or higher.  Lower extremities were maintained at a temperature of 31degrees Centigrade or higher. EMG was done with a concentric needle electrode.     Results:  Sensory NCS  - Right median and ulnar antidromic sensory nerve action potentials (SNAPs) showed normal amplitude and conduction velocity.  - Right median-ulnar palmar comparison study shows significant increase in right median peak latency in comparison to respective ulnar study    Motor NCS  -  Right median and ulnar orthodromic compound muscle action potentials (CMAPs) showed normal distal latency, amplitude and conduction velocity.     Needle EMG of selected proximal and distal limb muscles of the right upper extremity was performed as tabulated below. No abnormal spontaneous activity was observed in the sampled muscles. Motor unit potential morphology and recruitment patterns  were normal.     Interpretation:  This is an abnormal examination. There is electrophysiologic evidence of a right sided median mononeuropathy at the wrist (carpal tunnel syndrome), which is mild in severity.      EMG Physician:  Jesus Gonzalez MD   of Neurology  [facility]           Sensory NCS      Nerve / Sites Rec. Site Onset Peak Ref. NP Amp Ref. PP Amp Dist Noel Ref. Temp     ms ms ms  V  V  V cm m/s m/s  C   R MEDIAN - Dig II Anti      Wrist Dig II 2.66 3.80  17.0 10.0 24.2 14 52.7 48.0 32.5   R ULNAR - Dig V Anti      Wrist Dig V 2.03 2.86  24.7 8.0 42.4 12.5 61.5 48.0 32.5   R MEDIAN - Ulnar - Palmar      Median Wrist 1.88 2.45 2.40 10.6  23.7 8 42.7  32.5      Ulnar Wrist 1.46 1.88 2.40 11.5  16.7 8 54.9  32.6       Motor NCS      Nerve / Sites Rec. Site Lat Ref. Amp Ref. Rel Amp Dist Noel Ref. Dur. Area Temp.     ms ms mV mV % cm m/s m/s ms %  C   R MEDIAN - APB      Wrist APB 3.59 4.40 7.8 5.0 100 8   5.89 100 32.5      Elbow APB 7.40  7.3  94.1 21 55.2 48.0 6.09 94.9 32.4   R ULNAR - ADM      Wrist ADM 2.24 3.50 13.0 5.0 100 8   6.41 100 32.5      B.Elbow ADM 5.10  13.0  99.8 18 62.8 48.0 6.30 99.9 32.4      A.Elbow ADM 6.51  12.8  98.3 9 64.0 48.0 6.35 95.5 32.5       EMG Summary Table     Spontaneous Recruitment MUAP    IA Fib PSW Fasc H.F. Pattern Amp Dur. PPP   R. DELTOID N None None None None N N N N   R. TRICEPS N None None None None N N N N   R. EXT DIG COMM N None None None None N N N N   R. FIRST D INTEROSS N None None None None N N N N

## 2021-03-08 NOTE — LETTER
3/8/2021       RE: Jesus Hurd  [address] [place] [address]     Dear Colleague,    Thank you for referring your patient, Jesus Hurd, to the [facility] EMG CLINIC [place] at [facility]. Please see a copy of my visit note below.        [facility]  Electrodiagnostic Laboratory    Nerve Conduction & EMG Report      Patient:       Jesus Hurd  Patient ID:    [id number]  Gender:        Male  YOB: 1980  Age:           40 Years 5 Months      History & Examination:  Patient is a 39 y/o male who presents with right hand numbness and pain. He reports he previously had tendinitis in the right wrist. He received a cortisone shot in December which was helpful for that pain. In February he began to develop pain and numbness in all 5 fingers of the right hand. Symptoms are provoked by using the right hand for different activities. EMG ordered to look for the presence of right upper extremity carpal tunnel syndrome.     Techniques: Motor conduction studies were done with surface recording electrodes. Sensory conduction studies were performed with surface electrodes, unless indicated otherwise by (n), designating the use of subdermal recording electrodes. Temperature was monitored and recorded throughout the study. Upper extremities were maintained at a temperature of 32 degrees Centigrade or higher.  Lower extremities were maintained at a temperature of 31degrees Centigrade or higher. EMG was done with a concentric needle electrode.     Results:  Sensory NCS  - Right median and ulnar antidromic sensory nerve action potentials (SNAPs) showed normal amplitude and conduction velocity.  - Right median-ulnar palmar comparison study shows significant increase in right median peak latency in comparison to respective ulnar study    Motor NCS  -  Right median and ulnar orthodromic compound muscle action potentials (CMAPs) showed normal  distal latency, amplitude and conduction velocity.     Needle EMG of selected proximal and distal limb muscles of the right upper extremity was performed as tabulated below. No abnormal spontaneous activity was observed in the sampled muscles. Motor unit potential morphology and recruitment patterns were normal.     Interpretation:  This is an abnormal examination. There is electrophysiologic evidence of a right sided median mononeuropathy at the wrist (carpal tunnel syndrome), which is mild in severity.      EMG Physician:  Jesus Gonzalez MD   of Neurology  [facility]           Sensory NCS      Nerve / Sites Rec. Site Onset Peak Ref. NP Amp Ref. PP Amp Dist Noel Ref. Temp     ms ms ms  V  V  V cm m/s m/s  C   R MEDIAN - Dig II Anti      Wrist Dig II 2.66 3.80  17.0 10.0 24.2 14 52.7 48.0 32.5   R ULNAR - Dig V Anti      Wrist Dig V 2.03 2.86  24.7 8.0 42.4 12.5 61.5 48.0 32.5   R MEDIAN - Ulnar - Palmar      Median Wrist 1.88 2.45 2.40 10.6  23.7 8 42.7  32.5      Ulnar Wrist 1.46 1.88 2.40 11.5  16.7 8 54.9  32.6       Motor NCS      Nerve / Sites Rec. Site Lat Ref. Amp Ref. Rel Amp Dist Noel Ref. Dur. Area Temp.     ms ms mV mV % cm m/s m/s ms %  C   R MEDIAN - APB      Wrist APB 3.59 4.40 7.8 5.0 100 8   5.89 100 32.5      Elbow APB 7.40  7.3  94.1 21 55.2 48.0 6.09 94.9 32.4   R ULNAR - ADM      Wrist ADM 2.24 3.50 13.0 5.0 100 8   6.41 100 32.5      B.Elbow ADM 5.10  13.0  99.8 18 62.8 48.0 6.30 99.9 32.4      A.Elbow ADM 6.51  12.8  98.3 9 64.0 48.0 6.35 95.5 32.5       EMG Summary Table     Spontaneous Recruitment MUAP    IA Fib PSW Fasc H.F. Pattern Amp Dur. PPP   R. DELTOID N None None None None N N N N   R. TRICEPS N None None None None N N N N   R. EXT DIG COMM N None None None None N N N N   R. FIRST D INTEROSS N None None None None N N N N                  Again, thank you for allowing me to participate in the care of your patient.      Sincerely,    Jesus Gonzalez MD

## 2021-03-16 DIAGNOSIS — G56.01 CARPAL TUNNEL SYNDROME OF RIGHT WRIST: Primary | ICD-10-CM

## 2021-03-17 NOTE — TELEPHONE ENCOUNTER
DIAGNOSIS: Right mild carpal tunnel syndrome and right wrist ECU tendonitis/ Dr Theo Montenegro/ EMG   APPOINTMENT DATE: 3.18.21   NOTES STATUS DETAILS   OFFICE NOTE from referring provider Internal 2.16.21 Dr Theo Montenegro, [facility] Ortho  1.19.21   OFFICE NOTE from other specialist Internal 12.11.20 Dr Neo Paul, [facility] Sports Med  9.10.20 Dr Nadine Newman, [facility] FP  8.24.20 Sean Kenny   DISCHARGE SUMMARY from hospital N/A    DISCHARGE REPORT from the ER N/A    OPERATIVE REPORT Internal 2.5.21 Dr Montenegro   EMG report Internal 3.8.21 [facility]  12.17.20 [facility]   MEDICATION LIST Internal    MRI N/A    DEXA (osteoporosis/bone health) N/A    CT SCAN N/A    XRAYS (IMAGES & REPORTS) Received 8.24.20 R wrist, R forearm, [facility]

## 2021-03-18 ENCOUNTER — OFFICE VISIT (OUTPATIENT)
Dept: ORTHOPEDICS | Facility: CLINIC | Age: 41
End: 2021-03-18
Payer: COMMERCIAL

## 2021-03-18 ENCOUNTER — PRE VISIT (OUTPATIENT)
Dept: ORTHOPEDICS | Facility: CLINIC | Age: 41
End: 2021-03-18

## 2021-03-18 VITALS — BODY MASS INDEX: 33.43 KG/M2 | HEIGHT: 66 IN | WEIGHT: 208 LBS

## 2021-03-18 DIAGNOSIS — G56.01 CARPAL TUNNEL SYNDROME OF RIGHT WRIST: ICD-10-CM

## 2021-03-18 PROCEDURE — 76942 ECHO GUIDE FOR BIOPSY: CPT | Performed by: FAMILY MEDICINE

## 2021-03-18 PROCEDURE — 20526 THER INJECTION CARP TUNNEL: CPT | Mod: RT | Performed by: FAMILY MEDICINE

## 2021-03-18 PROCEDURE — 99207 PR DROP WITH A PROCEDURE: CPT | Performed by: FAMILY MEDICINE

## 2021-03-18 RX ADMIN — BETAMETHASONE SODIUM PHOSPHATE AND BETAMETHASONE ACETATE 6 MG: 3; 3 INJECTION, SUSPENSION INTRA-ARTICULAR; INTRALESIONAL; INTRAMUSCULAR; SOFT TISSUE at 10:34

## 2021-03-18 RX ADMIN — LIDOCAINE HYDROCHLORIDE 1 ML: 10 INJECTION, SOLUTION EPIDURAL; INFILTRATION; INTRACAUDAL; PERINEURAL at 10:34

## 2021-03-18 ASSESSMENT — MIFFLIN-ST. JEOR: SCORE: 1640.23

## 2021-03-18 NOTE — NURSING NOTE
[address] [address]  Dept: [phone]  ______________________________________________________________________________    Patient: Jesus Hurd   : 1980   MRN: [id number]   2021    INVASIVE PROCEDURE SAFETY CHECKLIST    Date: 3/18/21   Procedure:R carpal tunnel CSI US guided with Nora  Patient Name: Jesus uHrd  MRN: [id number]  YOB: 1980    Action: Complete sections as appropriate. Any discrepancy results in a HARD COPY until resolved.     PRE PROCEDURE:  Patient ID verified with 2 identifiers (name and  or MRN): Yes  Procedure and site verified with patient/designee (when able): Yes  Accurate consent documentation in medical record: Yes  H&P (or appropriate assessment) documented in medical record: Yes  H&P must be up to 20 days prior to procedure and updates within 24 hours of procedure as applicable: NA  Relevant diagnostic and radiology test results appropriately labeled and displayed as applicable: Yes  Procedure site(s) marked with provider initials: NA    TIMEOUT:  Time-Out performed immediately prior to starting procedure, including verbal and active participation of all team members addressing the following:Yes  * Correct patient identify  * Confirmed that the correct side and site are marked  * An accurate procedure consent form  * Agreement on the procedure to be done  * Correct patient position  * Relevant images and results are properly labeled and appropriately displayed  * The need to administer antibiotics or fluids for irrigation purposes during the procedure as applicable   * Safety precautions based on patient history or medication use    DURING PROCEDURE: Verification of correct person, site, and procedures any time the responsibility for care of the patient is transferred to another member of the care team.       Prior to injection, verified patient identity using patient's name  and date of birth.  Due to injection administration, patient instructed to remain in clinic for 15 minutes  afterwards, and to report any adverse reaction to me immediately.    Carpal tunnel    Drug Amount Wasted:  Yes: 8 mg/ml   Vial/Syringe: Single dose vial  Expiration Date:  10/21 - Celestone  7/24 - Lidocaine      Filippo Blanca, [facility]  March 18, 2021

## 2021-03-18 NOTE — LETTER
3/18/2021         RE: Jesus Hurd  [address] [place] [address]        Dear Colleague,    Thank you for referring your patient, Jesus Hurd, to the [facility]. Please see a copy of my visit note below.      [facility] CLINICS AND SURGERY CENTER  SPORTS & ORTHOPEDIC CLINIC VISIT     Mar 18, 2021        ASSESSMENT & PLAN      Carpal tunnel syndrome of right wrist  -     Orthopedic & Spine  Referral        Reviewed imaging and assessment with patient in detail  Steroid injection. See procedure note for details.     Freddy Ortega MD  [facility] ORTHOPEDIC [facility]    -----  Chief Complaint   Patient presents with     Right Wrist - Pain       SUBJECTIVE  Jesus Hurd is a/an 40 year old adult who is seen in consultation at the request of Dr.Nicholas Moni MD for evaluation of  Carpal Tunnel Syndrome of R Wrist.     The patient is seen by themselves.  The patient is Right handed    Onset: 6 month(s) ago. Patient describes injury as gradually getting worse as the time goes on. Has tried night time splinting, CSI, Hand therapy, and compression in hopes to avoid surgery. Last CSI 12/11/20  Location of Pain: right wrist  Worsened by: ROM, lifting, gripping   Better with: Rest   Treatments tried: hand therapy (1 visits), corticosteroid injection (most recent date: 12/11/20) that provided  2.5 month(s) of relief and casting/splinting/bracing  Associated symptoms: numbness, tingling and weakness    Orthopedic/Surgical history: NO  Social History/Occupation:  and        REVIEW OF SYSTEMS:    Do you have fever, chills, weight loss? No    Do you have any vision problems? No    Do you have any chest pain or edema? No    Do you have any shortness of breath or wheezing?  No    Do you have stomach problems? No    Do you have any numbness or focal weakness? No    Do you have diabetes? No    Do you have problems with  "bleeding or clotting? No    Do you have an rashes or other skin lesions? No    OBJECTIVE:  Ht 1.676 m (5' 6\")   Wt 94.3 kg (208 lb)   BMI 33.57 kg/m       General: Alert, pleasant, no distress  Radiographs: Tinel positive Phalen positive reports decreased sensation of the middle and index finger.  Strength intact.    RADIOLOGY:            Hand / Upper Extremity Injection/Arthrocentesis: R carpal tunnel    Date/Time: 3/18/2021 10:34 AM  Performed by: Freddy Ortega MD  Authorized by: Freddy Ortega MD     Indications:  Pain  Needle Size:  25 G  Guidance: ultrasound    Approach:  Volar  Condition: carpal tunnel      Site:  R carpal tunnel  Medications:  6 mg betamethasone acet & sod phos 6 (3-3) MG/ML; 1 mL lidocaine (PF) 1 %  Outcome:  Tolerated well, no immediate complications  Procedure discussed: discussed risks, benefits, and alternatives    Consent Given by:  Patient  Timeout: timeout called immediately prior to procedure    Prep: patient was prepped and draped in usual sterile fashion     Patient was positioned seated with righ hand on exam table in a supinated position.  The area overlying the volar aspect of the right wrist was cleaned with a chlorhexidine swab.  Next using sterile technique the right median nerve at the level of the carpal tunnel was visualized with ultrasound.  Ultrasound was necessary for the procedure to ensure proper placement of the needle clear of nearby vascular and neural structures. The skin was anesthetized with ethyl chloride spray.  A 25-gauge needle was then inserted underneath the carpal ligament under direct and continuous ultrasound guidance using an in plane approach. A solution of 6 mg celestone and 1.0 mL 1% lidocaine was injected and seen flowing around the nerve.  Images were captured and saved to the permanent record.  The patient tolerated the procedure well and there were no immediate complications.  Patient was given routine postinjection " instructions and advised to follow-up with any increase in pain, redness, swelling or drainage from the injection site.    Freddy Ortega MD

## 2021-03-18 NOTE — PROGRESS NOTES
Hand / Upper Extremity Injection/Arthrocentesis: R carpal tunnel    Date/Time: 3/18/2021 10:34 AM  Performed by: Freddy Ortega MD  Authorized by: Freddy Ortega MD     Indications:  Pain  Needle Size:  25 G  Guidance: ultrasound    Approach:  Volar  Condition: carpal tunnel      Site:  R carpal tunnel  Medications:  6 mg betamethasone acet & sod phos 6 (3-3) MG/ML; 1 mL lidocaine (PF) 1 %  Outcome:  Tolerated well, no immediate complications  Procedure discussed: discussed risks, benefits, and alternatives    Consent Given by:  Patient  Timeout: timeout called immediately prior to procedure    Prep: patient was prepped and draped in usual sterile fashion     Patient was positioned seated with righ hand on exam table in a supinated position.  The area overlying the volar aspect of the right wrist was cleaned with a chlorhexidine swab.  Next using sterile technique the right median nerve at the level of the carpal tunnel was visualized with ultrasound.  Ultrasound was necessary for the procedure to ensure proper placement of the needle clear of nearby vascular and neural structures. The skin was anesthetized with ethyl chloride spray.  A 25-gauge needle was then inserted underneath the carpal ligament under direct and continuous ultrasound guidance using an in plane approach. A solution of 6 mg celestone and 1.0 mL 1% lidocaine was injected and seen flowing around the nerve.  Images were captured and saved to the permanent record.  The patient tolerated the procedure well and there were no immediate complications.  Patient was given routine postinjection instructions and advised to follow-up with any increase in pain, redness, swelling or drainage from the injection site.    Freddy Ortega MD

## 2021-03-20 RX ORDER — LIDOCAINE HYDROCHLORIDE 10 MG/ML
1 INJECTION, SOLUTION EPIDURAL; INFILTRATION; INTRACAUDAL; PERINEURAL
Status: SHIPPED | OUTPATIENT
Start: 2021-03-18

## 2021-03-20 RX ORDER — BETAMETHASONE SODIUM PHOSPHATE AND BETAMETHASONE ACETATE 3; 3 MG/ML; MG/ML
6 INJECTION, SUSPENSION INTRA-ARTICULAR; INTRALESIONAL; INTRAMUSCULAR; SOFT TISSUE
Status: SHIPPED | OUTPATIENT
Start: 2021-03-18

## 2021-03-22 NOTE — PROGRESS NOTES
Jesus is a 40 year old who is being evaluated via a billable video visit.      How would you like to obtain your AVS? MyChart  If the video visit is dropped, the invitation should be resent by: Text to cell phone: [phone]  Will anyone else be joining your video visit? No      I had called the patient on 3/23/21, three times for the virtual visit and was unable to connect. The visit is a NO SHOW. Clinic staff notified to call patient to reschedule the visit.    Sangeetha Vega MD  [facility] [place]  [address], [address], [place] [id number]

## 2021-03-22 NOTE — PATIENT INSTRUCTIONS

## 2021-03-23 ENCOUNTER — VIRTUAL VISIT (OUTPATIENT)
Dept: SLEEP MEDICINE | Facility: CLINIC | Age: 41
End: 2021-03-23
Attending: STUDENT IN AN ORGANIZED HEALTH CARE EDUCATION/TRAINING PROGRAM
Payer: COMMERCIAL

## 2021-03-23 DIAGNOSIS — R29.818 SUSPECTED SLEEP APNEA: ICD-10-CM

## 2021-03-23 PROCEDURE — 99207 PR NO BILLABLE SERVICE THIS VISIT: CPT | Performed by: INTERNAL MEDICINE

## 2021-03-30 DIAGNOSIS — E78.2 MIXED HYPERLIPIDEMIA: ICD-10-CM

## 2021-03-30 RX ORDER — ATORVASTATIN CALCIUM 40 MG/1
40 TABLET, FILM COATED ORAL DAILY
Qty: 90 TABLET | Refills: 1 | Status: SHIPPED | OUTPATIENT
Start: 2021-03-30 | End: 2021-04-29

## 2021-03-30 NOTE — TELEPHONE ENCOUNTER

## 2021-04-12 DIAGNOSIS — F64.0 GENDER DYSPHORIA IN ADULT: ICD-10-CM

## 2021-04-12 RX ORDER — TESTOSTERONE CYPIONATE 200 MG/ML
50 INJECTION, SOLUTION INTRAMUSCULAR WEEKLY
Qty: 12 ML | Refills: 1 | Status: SHIPPED | OUTPATIENT
Start: 2021-04-12 | End: 2021-04-29

## 2021-04-12 NOTE — TELEPHONE ENCOUNTER
"Request for medication refill:  testosterone cypionate (DEPOTESTOSTERONE) 200 MG/ML injection    Providers if patient needs an appointment and you are willing to give a one month supply please refill for one month and  send a letter/MyChart using \".SMILLIMITEDREFILL\" .smillimited and route chart to \"P SMI \" (Giving one month refill in non controlled medications is strongly recommended before denial)    If refill has been denied, meaning absolutely no refills without visit, please complete the smart phrase \".smirxrefuse\" and route it to the \"P SMI MED REFILLS\"  pool to inform the patient and the pharmacy.    Tamie Tse MA        "

## 2021-04-28 RX ORDER — ARIPIPRAZOLE 30 MG/1
30 TABLET ORAL DAILY
COMMUNITY
Start: 2021-04-09 | End: 2024-02-05

## 2021-04-29 ENCOUNTER — OFFICE VISIT (OUTPATIENT)
Dept: FAMILY MEDICINE | Facility: CLINIC | Age: 41
End: 2021-04-29
Payer: COMMERCIAL

## 2021-04-29 VITALS
DIASTOLIC BLOOD PRESSURE: 86 MMHG | HEART RATE: 95 BPM | OXYGEN SATURATION: 98 % | WEIGHT: 202.16 LBS | SYSTOLIC BLOOD PRESSURE: 131 MMHG | BODY MASS INDEX: 32.63 KG/M2 | TEMPERATURE: 98 F

## 2021-04-29 DIAGNOSIS — F64.0 GENDER DYSPHORIA IN ADULT: Primary | ICD-10-CM

## 2021-04-29 DIAGNOSIS — F15.11 METHAMPHETAMINE ABUSE IN REMISSION (H): ICD-10-CM

## 2021-04-29 DIAGNOSIS — Z71.89 COUNSELED ABOUT COVID-19 VIRUS INFECTION: ICD-10-CM

## 2021-04-29 DIAGNOSIS — E78.2 MIXED HYPERLIPIDEMIA: ICD-10-CM

## 2021-04-29 DIAGNOSIS — Z71.85 IMMUNIZATION COUNSELING: ICD-10-CM

## 2021-04-29 DIAGNOSIS — F31.9 BIPOLAR 1 DISORDER (H): ICD-10-CM

## 2021-04-29 LAB
ALBUMIN SERPL-MCNC: 3.6 G/DL (ref 3.4–5)
ALP SERPL-CCNC: 106 U/L (ref 40–150)
ALT SERPL W P-5'-P-CCNC: 48 U/L (ref 0–50)
ANION GAP SERPL CALCULATED.3IONS-SCNC: 6 MMOL/L (ref 3–14)
AST SERPL W P-5'-P-CCNC: 34 U/L (ref 0–45)
BILIRUB SERPL-MCNC: 0.2 MG/DL (ref 0.2–1.3)
BUN SERPL-MCNC: 9 MG/DL (ref 7–30)
CALCIUM SERPL-MCNC: 8.6 MG/DL (ref 8.5–10.1)
CHLORIDE SERPL-SCNC: 106 MMOL/L (ref 94–109)
CHOLEST SERPL-MCNC: 98 MG/DL
CO2 SERPL-SCNC: 24 MMOL/L (ref 20–32)
CREAT SERPL-MCNC: 0.77 MG/DL (ref 0.52–1.04)
ERYTHROCYTE [DISTWIDTH] IN BLOOD BY AUTOMATED COUNT: 12.4 % (ref 10–15)
GFR SERPL CREATININE-BSD FRML MDRD: >90 ML/MIN/{1.73_M2}
GLUCOSE SERPL-MCNC: 86 MG/DL (ref 70–99)
HCT VFR BLD AUTO: 50.4 % (ref 35–47)
HDLC SERPL-MCNC: 29 MG/DL
HGB BLD-MCNC: 16.6 G/DL (ref 11.7–15.7)
LDLC SERPL CALC-MCNC: 39 MG/DL
MCH RBC QN AUTO: 31.3 PG (ref 26.5–33)
MCHC RBC AUTO-ENTMCNC: 32.9 G/DL (ref 31.5–36.5)
MCV RBC AUTO: 95 FL (ref 78–100)
NONHDLC SERPL-MCNC: 69 MG/DL
PLATELET # BLD AUTO: 287 10E9/L (ref 150–450)
POTASSIUM SERPL-SCNC: 3.9 MMOL/L (ref 3.4–5.3)
PROT SERPL-MCNC: 7.2 G/DL (ref 6.8–8.8)
RBC # BLD AUTO: 5.3 10E12/L (ref 3.8–5.2)
SODIUM SERPL-SCNC: 136 MMOL/L (ref 133–144)
TRIGL SERPL-MCNC: 148 MG/DL
WBC # BLD AUTO: 7.3 10E9/L (ref 4–11)

## 2021-04-29 PROCEDURE — 36415 COLL VENOUS BLD VENIPUNCTURE: CPT | Performed by: STUDENT IN AN ORGANIZED HEALTH CARE EDUCATION/TRAINING PROGRAM

## 2021-04-29 PROCEDURE — 80061 LIPID PANEL: CPT | Performed by: STUDENT IN AN ORGANIZED HEALTH CARE EDUCATION/TRAINING PROGRAM

## 2021-04-29 PROCEDURE — 85027 COMPLETE CBC AUTOMATED: CPT | Performed by: STUDENT IN AN ORGANIZED HEALTH CARE EDUCATION/TRAINING PROGRAM

## 2021-04-29 PROCEDURE — 84403 ASSAY OF TOTAL TESTOSTERONE: CPT | Performed by: STUDENT IN AN ORGANIZED HEALTH CARE EDUCATION/TRAINING PROGRAM

## 2021-04-29 PROCEDURE — 99214 OFFICE O/P EST MOD 30 MIN: CPT | Performed by: STUDENT IN AN ORGANIZED HEALTH CARE EDUCATION/TRAINING PROGRAM

## 2021-04-29 PROCEDURE — 80053 COMPREHEN METABOLIC PANEL: CPT | Performed by: STUDENT IN AN ORGANIZED HEALTH CARE EDUCATION/TRAINING PROGRAM

## 2021-04-29 RX ORDER — TESTOSTERONE CYPIONATE 200 MG/ML
50 INJECTION, SOLUTION INTRAMUSCULAR WEEKLY
Qty: 12 ML | Refills: 1 | Status: SHIPPED | OUTPATIENT
Start: 2021-04-29 | End: 2021-11-03

## 2021-04-29 RX ORDER — ATORVASTATIN CALCIUM 40 MG/1
40 TABLET, FILM COATED ORAL DAILY
Qty: 90 TABLET | Refills: 1 | Status: SHIPPED | OUTPATIENT
Start: 2021-04-29 | End: 2021-11-03

## 2021-04-29 NOTE — PROGRESS NOTES
"  Assessment & Plan     Jesus was seen today for recheck.    Diagnoses and all orders for this visit:    Gender dysphoria in adult  -     Lipid panel  -     Comprehensive metabolic panel  -     CBC with platelets  -     Testosterone total  -     Needle, Disp, 25G X 1-1/2\" MISC; Use once weekly for administering hormone IM  -     needle, disp, 18G X 1\" MISC; Use to draw up hormones once weekly  -     testosterone cypionate (DEPOTESTOSTERONE) 200 MG/ML injection; Inject 0.25 mLs (50 mg) into the muscle once a week  -     syringe, disposable, 1 ML MISC; Use once weekly to draw up hormones  Patient following up for gender dysphoria and masculinization medication therapy with testosterone.  Refilled sharps and supplies today.  Checking routine laboratories as above.  Will refill testosterone at current dose as he has had stable lab levels in the past.  Will reach out via "[employer] when laboratories result.    Mixed hyperlipidemia  -     atorvastatin (LIPITOR) 40 MG tablet; Take 1 tablet (40 mg) by mouth daily  Patient now on statin.  Rechecking lipids today.  Previously uncontrolled.    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  Continues following with arms worker.  Is getting reconnected in with therapist.    Counseled about COVID-19 virus infection  Immunization counseling  Discussed Covid vaccination today.  Discussed reasons that people are hesitant.  Questions were elicited and answered.  Patient amenable and has preference for [employer] vaccine.  Will discuss with  concerning coordination of vaccine.      35 minutes spent on the date of the encounter doing chart review, history and exam, documentation and further activities per the note    Return in about 6 months (around 10/29/2021).    Emerson Machado DO  [facility] SOY Lee is a 40 year old who presents for the following health issues     HPI "   Mental Health  Kids: Last August last time seen kids. NO contact contract. Does facetime and video.   Prior therapist- Trying to get into contact. Still sees ARMS worker- Goals: working on trust issues and housing. No psychosis symptoms. No paranoia. Watching self help resource videos.     Bertin Lee is a 40 year old individual that uses pronouns He/Him/His/Himself that presents today for follow up of:  masculinizing hormone therapy.     Gender identity: male    Any special concerns today?  no current concerns    On hormones?  YES +++ Shot day of the week? Saturday      Due for labs?  Yes      +++ Refills of meds needed?  Yes    Gender affirmation is being sought in these other ways:           Objective    /86 (BP Location: Left arm, Patient Position: Sitting, Cuff Size: Adult Large)   Pulse 95   Temp 98  F (36.7  C) (Oral)   Wt 91.7 kg (202 lb 2.6 oz)   SpO2 98%   Breastfeeding No   BMI 32.63 kg/m    Body mass index is 32.63 kg/m .  Physical Exam   General: Alert and oriented, in no acute distress.  Gender presentation aligned with identity.  Skin: Warm and dry, no abnormalities noted.  Eyes: Extra-ocular muscles grossly intact, pupils equal.  ENT: Speech intact, nasal passages open, no hearing impairment noted.  CV: No cyanosis or pallor, warm and well perfused.  Respiratory: No respiratory distress, no accessory muscle use.  Neuro: Gait and station normal, comprehension intact. Gross and fine motor skills intact.   Psychiatric: Mood and affect appear normal.   Extremities: Warm, able to move all four extremities at will.    Results are ordered and pending

## 2021-05-03 LAB — TESTOST SERPL-MCNC: 342 NG/DL (ref 8–60)

## 2021-05-11 ENCOUNTER — IMMUNIZATION (OUTPATIENT)
Dept: LAB | Facility: CLINIC | Age: 41
End: 2021-05-11
Payer: COMMERCIAL

## 2021-05-25 ENCOUNTER — RECORDS - HEALTHEAST (OUTPATIENT)
Dept: ADMINISTRATIVE | Facility: CLINIC | Age: 41
End: 2021-05-25

## 2021-05-29 ENCOUNTER — RECORDS - HEALTHEAST (OUTPATIENT)
Dept: ADMINISTRATIVE | Facility: CLINIC | Age: 41
End: 2021-05-29

## 2021-05-30 ENCOUNTER — RECORDS - HEALTHEAST (OUTPATIENT)
Dept: ADMINISTRATIVE | Facility: CLINIC | Age: 41
End: 2021-05-30

## 2021-05-31 ENCOUNTER — RECORDS - HEALTHEAST (OUTPATIENT)
Dept: ADMINISTRATIVE | Facility: CLINIC | Age: 41
End: 2021-05-31

## 2021-06-01 ENCOUNTER — RECORDS - HEALTHEAST (OUTPATIENT)
Dept: ADMINISTRATIVE | Facility: CLINIC | Age: 41
End: 2021-06-01

## 2021-06-09 NOTE — TELEPHONE ENCOUNTER
"Request for medication refill:  Testosterone     Providers if patient needs an appointment and you are willing to give a one month supply please refill for one month and  send a letter/MyChart using \".SMILLIMITEDREFILL\" .smillimited and route chart to \"P SMI \" (Giving one month refill in non controlled medications is strongly recommended before denial)    If refill has been denied, meaning absolutely no refills without visit, please complete the smart phrase \".smirxrefuse\" and route it to the \"P SMI MED REFILLS\"  pool to inform the patient and the pharmacy.    Yecenia Dan [facility]        " yes
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
## 2021-06-10 NOTE — PROGRESS NOTES
"Outpatient Substance Use Disorder Treatment - Initial Services Plan     Patient  Name: Jesus Hurd  MRN: [id number]   : 1980  Admit Date: 2020        Patient describes their immediate need: To learn recovery skills to prevent relapse. Learning alternative coping skills for dealing high stress situations.    Are there any immediate Safety Needs such as (physical, stability, mobility):  Pt is able to get medical care as needed. Pt denies immediate concerns.     Immediate Health Needs and Plan:   None    Vulnerable Adult: No     Issues to be addressed in the first sessions:   Orientation to program.  Introduction to peers.    Patient strengths and needs:   Strengths identified as \"I am a people person, outgoing, great father.\"  Needs identified as \"[place] setting with my kids.\"    Plan for patient for time between intake and completion of the treatment plan:   Attend all group therapy sessions as directed, complete all written and oral assignments as directed, and remain clean and sober. A relapse, if any, must be reported to staff immediately in order to ensure you are receiving the proper level of care. If you cannot attend a group therapy session you must call contact information provided in intake folder and leave a message before or during group hours.       Vulnerable Adult Review   [X] Review of the Facility Abuse Prevention plan was reviewed with the patient   [X] No Individual Abuse Plan is necessary   [ ] In addition to the Facility Abuse Prevention plan, an Individual Abuse Plan will be put in place       Staff Name/Title: PEPE Butcher  Date: 2020  Time: 1:44 PM          "

## 2021-06-10 NOTE — PROGRESS NOTES
Telemedicine Visit: The patient's condition can be safely assessed and treated via synchronous audio and visual telemedicine encounter.      Reason for Telemedicine Visit: Services only offered telehealth    Originating Site (Patient Location): Patient's home    Distant Site (Provider Location): Provider Remote Setting- Home Office    Consent:  The patient/guardian has verbally consented to: the potential risks and benefits of telemedicine (video visit) versus in person care; bill my insurance or make self-payment for services provided; and responsibility for payment of non-covered services.     Mode of Communication:  Video Conference via [employer]    Call Started at: 1:30 PM  Call Ended at: 2:18 PM    As the provider I attest to compliance with applicable laws and regulations related to telemedicine.

## 2021-06-10 NOTE — PROGRESS NOTES
"Individual Phone Session    Jesus Hurd is a 39 y.o. female who is being evaluated via telephone visit.      The patient has been notified of the following:      \"We have found that certain health care needs can be provided without the need for a face to face visit.  This service lets us provide the care you need with a phone conversation. I will have full access to your [facility] medical record during this entire phone call. I will be taking notes for your medical record. Since this is like an office visit, we will bill your insurance company for this service. There are potential benefits and risks of telephone visits (e.g. limits to patient confidentiality) that differ from in-person visits.?  Confidentiality still applies for telephone services, and nobody will record the visit.  It is important to be in a quiet, private space that is free of distractions (including cell phone or other devices) during the visit.?If during the course of the call I believe a telephone visit is not appropriate, you will not be charged for this service\"    Counselor and patient spoke via phone for 56 minutes for purpose of chemical health evaluation.     Call Notes: Counselor contacted patient for chemical health evaluation during suspension of in person services.     Provider location: [facility]Remote   Patient location: Home/Car  Mode of Transmission: Telephone    Call Started at: 9:00 AM  Call Ended at: 9:56 AM    Patient's engagement in the telephone visit: PEPE Cloud  7/29/2020, 9:01 AM      "
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
## 2021-06-11 NOTE — TELEPHONE ENCOUNTER
Phone Call:      D) [facility] CPS-[facility] Sthew called in this date re: request for treatment update. Counselor informed CPS that at this time there are no immediate concerns and attendance and participation are well. CPS reports limited UA availability and requests treatment UAs to aid in monitoring Pt.'s sobriety.    Staff Name and Title: PEPE Peters  Date:  9/15/2020  Time:  4:23 PM

## 2021-06-11 NOTE — TELEPHONE ENCOUNTER
Phone Call:    D) Pt. called this date and left message re: request to be excused from group this date due to illness. Call back was made this date, Pt. Reports feeling ill and getting ready to leave work early for the day and for that reason requesting to be excused from group. Pt. Reports if feeling better by group start time he will connect and attend. Counselor agreed to excuse Pt. From group this date due to illness if Pt. Does not attinding. Pt. Agreed to if missed the session will need to be made up by pushing back the program phase transition date.    Staff Name and Title: PEPE Peters  Date:  9/16/2020  Time:  12:39 PM

## 2021-06-11 NOTE — TELEPHONE ENCOUNTER
Phone Call:    D) CPS-[facility] Sthew called in this date re: Pt. sobriety concerns.  reports that CPS staff and MH therapy staff are concerned because of using related behaviors they are noticing in the Pt. She reports the Pt. Is beginning to arrive late or miss appointments and reacts in a strange manner during in person meetings.  Counselor reported to CPS that Pt. Has not taken the requested UA and counselor will follow up with Pt.      Staff Name and Title: PEPE Peters  Date:  9/30/2020  Time:  1:26 PM

## 2021-06-11 NOTE — TELEPHONE ENCOUNTER
Phone Call:    D) Pt. called this date and left message re: employer has requested Pt. To work later this date due to short staff issues so Pt. Requests to be excused from group this date. Call back was made this date, message left, confirming Pt. Will be excused this date from group.    Staff Name and Title: PEPE Peters  Date:  9/28/2020  Time:  3:15 PM

## 2021-06-11 NOTE — TELEPHONE ENCOUNTER
Phone Call:    D) Call was made to Pt. this date and message left re: UA request, an e-mail requesting UA testing was also sent to the Pt. This date.    Staff Name and Title: PEPE Peters  Date:  9/30/2020  Time:  1:43 PM

## 2021-06-11 NOTE — TELEPHONE ENCOUNTER
Phone Call:    D) Call was made to [facility] Akins this date and message left re: requesting call back for Pt. treatment update.      Staff Name and Title: PEPE Peters  Date:  9/15/2020  Time:  3:30 PM

## 2021-06-12 NOTE — TELEPHONE ENCOUNTER
Phone Call:    D) Call was made to [facility] Akins this date and message left re: update from Pt. 1x1 session this date, and requesting call back if needed.    Staff Name and Title: PEPE Peters  Date:  10/21/2020  Time:  3:13 PM

## 2021-06-12 NOTE — TELEPHONE ENCOUNTER
Phone Call:    D) [facility] CPS-[facility]essw called in this date re: Pt. continued use concerns.  CPS  reports significant changes have occurred in the status of the Pt.'s children placement decisions. CPS  also reports CPS has continued use concerns.  Counselor and CPS  discussed recommendations for treatment higher level of care. Both plan to follow up with Pt. And then with each other.    Staff Name and Title: PEPE Peters  Date:  10/15/2020  Time:  3:15 PM

## 2021-06-12 NOTE — TELEPHONE ENCOUNTER
Phone Call:    D) Call was made to [facility]kins this date and message left re: Pt. Group attendance concerns, and requesting call back.    Staff Name and Title: PEPE Peters  Date:  10/15/2020  Time:  1:58 PM

## 2021-06-12 NOTE — TELEPHONE ENCOUNTER
Phone Call:    D) [facility] A[facility] called in this date re: Pt. Contact follow-up.   reports having contact with Pt. On 10/15/20. She reports the Pt. Did not admit to use, but made a statement that seemed to suggest recent use has occurred.  also reports encouraging Pt. To make contact with treatment and follow recommendations.   Counselor informed  that Pt. Has not yet made contact with treatment and due to unexcused absences may face AWOL discharge by end of week or early next week. Counselor also informed  of Pt.'s missed UAs and that missed UAs are considered same as positive results.     Staff Name and Title: PEPE Peters  Date:  10/19/2020  Time:  2:38 PM

## 2021-06-12 NOTE — TELEPHONE ENCOUNTER
Phone Call:    D) Call was made to Pt. this date and message left re: attendance concerns, reminder of UA requests, request to schedule an individual session, and requesting call back.    Staff Name and Title: PEPE Peters  Date:  10/19/2020  Time:  12:26 PM

## 2021-06-12 NOTE — TELEPHONE ENCOUNTER
Phone Call:    D) Call was made to Pt. this date and message left re: attendance concerns, and requesting call back.    Staff Name and Title: PEPE Peters  Date:  10/15/2020  Time:  1:48 PM

## 2021-06-13 NOTE — TELEPHONE ENCOUNTER
Phone Call:    D) CPS-[facility] A[facility] called on 11/18/20 and left message re: request for copy of Pt. Discharge summary and providing a fax number.   Counselor followed up this date, sending fax as requested.    Staff Name and Title: PEPE Peters  Date:  11/19/2020  Time:  10:29 AM

## 2021-06-30 ENCOUNTER — HOSPITAL ENCOUNTER (EMERGENCY)
Facility: CLINIC | Age: 41
Discharge: HOME | End: 2021-06-30
Attending: EMERGENCY MEDICINE | Admitting: EMERGENCY MEDICINE
Payer: COMMERCIAL

## 2021-06-30 ENCOUNTER — APPOINTMENT (OUTPATIENT)
Dept: CT IMAGING | Facility: CLINIC | Age: 41
End: 2021-06-30
Attending: EMERGENCY MEDICINE
Payer: COMMERCIAL

## 2021-06-30 ENCOUNTER — OFFICE VISIT (OUTPATIENT)
Dept: FAMILY MEDICINE | Facility: CLINIC | Age: 41
End: 2021-06-30
Payer: COMMERCIAL

## 2021-06-30 VITALS
SYSTOLIC BLOOD PRESSURE: 119 MMHG | TEMPERATURE: 98 F | OXYGEN SATURATION: 98 % | HEART RATE: 107 BPM | DIASTOLIC BLOOD PRESSURE: 84 MMHG

## 2021-06-30 VITALS
HEART RATE: 97 BPM | SYSTOLIC BLOOD PRESSURE: 134 MMHG | RESPIRATION RATE: 16 BRPM | OXYGEN SATURATION: 100 % | TEMPERATURE: 98.4 F | DIASTOLIC BLOOD PRESSURE: 88 MMHG

## 2021-06-30 DIAGNOSIS — S09.90XA CLOSED HEAD INJURY, INITIAL ENCOUNTER: ICD-10-CM

## 2021-06-30 DIAGNOSIS — S00.83XA CONTUSION OF FACE, INITIAL ENCOUNTER: ICD-10-CM

## 2021-06-30 DIAGNOSIS — S16.1XXA STRAIN OF NECK MUSCLE, INITIAL ENCOUNTER: ICD-10-CM

## 2021-06-30 DIAGNOSIS — S09.90XA HEAD TRAUMA, INITIAL ENCOUNTER: Primary | ICD-10-CM

## 2021-06-30 PROCEDURE — 99285 EMERGENCY DEPT VISIT HI MDM: CPT | Performed by: EMERGENCY MEDICINE

## 2021-06-30 PROCEDURE — 70450 CT HEAD/BRAIN W/O DYE: CPT

## 2021-06-30 PROCEDURE — 99215 OFFICE O/P EST HI 40 MIN: CPT | Performed by: FAMILY MEDICINE

## 2021-06-30 PROCEDURE — 72125 CT NECK SPINE W/O DYE: CPT | Mod: 26 | Performed by: RADIOLOGY

## 2021-06-30 PROCEDURE — 70450 CT HEAD/BRAIN W/O DYE: CPT | Mod: 26 | Performed by: RADIOLOGY

## 2021-06-30 PROCEDURE — 70486 CT MAXILLOFACIAL W/O DYE: CPT

## 2021-06-30 PROCEDURE — 70486 CT MAXILLOFACIAL W/O DYE: CPT | Mod: 26 | Performed by: RADIOLOGY

## 2021-06-30 PROCEDURE — 72125 CT NECK SPINE W/O DYE: CPT

## 2021-06-30 PROCEDURE — 99285 EMERGENCY DEPT VISIT HI MDM: CPT | Mod: 25 | Performed by: EMERGENCY MEDICINE

## 2021-06-30 ASSESSMENT — ENCOUNTER SYMPTOMS
COUGH: 0
EYE REDNESS: 0
WEAKNESS: 0
DIFFICULTY URINATING: 0
ABDOMINAL PAIN: 0
FEVER: 0
NAUSEA: 0
SLEEP DISTURBANCE: 0
DYSPHORIC MOOD: 0
SORE THROAT: 0
SHORTNESS OF BREATH: 0
HEADACHES: 1
VOMITING: 0
NECK PAIN: 1
BACK PAIN: 0

## 2021-06-30 ASSESSMENT — VISUAL ACUITY
OD: 20/25
OS: 20/25
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
## 2021-07-22 ENCOUNTER — OFFICE VISIT (OUTPATIENT)
Dept: FAMILY MEDICINE | Facility: CLINIC | Age: 41
End: 2021-07-22
Payer: COMMERCIAL

## 2021-07-22 VITALS
DIASTOLIC BLOOD PRESSURE: 88 MMHG | TEMPERATURE: 98 F | HEART RATE: 102 BPM | BODY MASS INDEX: 31.54 KG/M2 | SYSTOLIC BLOOD PRESSURE: 132 MMHG | WEIGHT: 195.4 LBS | OXYGEN SATURATION: 99 %

## 2021-07-22 DIAGNOSIS — G56.01 CARPAL TUNNEL SYNDROME OF RIGHT WRIST: ICD-10-CM

## 2021-07-22 DIAGNOSIS — M25.531 RIGHT WRIST PAIN: Primary | ICD-10-CM

## 2021-07-22 DIAGNOSIS — Z23 ENCOUNTER FOR IMMUNIZATION: ICD-10-CM

## 2021-07-22 PROCEDURE — 99213 OFFICE O/P EST LOW 20 MIN: CPT | Mod: 25 | Performed by: FAMILY MEDICINE

## 2021-07-22 PROCEDURE — 90471 IMMUNIZATION ADMIN: CPT | Mod: 59 | Performed by: FAMILY MEDICINE

## 2021-07-22 PROCEDURE — 90746 HEPB VACCINE 3 DOSE ADULT IM: CPT | Performed by: FAMILY MEDICINE

## 2021-07-22 NOTE — LETTER
Jesus Hurd  1980    To whom it may concern,       Jesus Hurd is under my medical care. He may return to work with the following restrictions:     1) No chopping (unable to do repetititive wrist or hand motion).   2) No lifting >15 pounds    These are in effect for 7/22/21 until 8/5/21 at which point we will re-evaluate in our clinic.     Leigh Ann Warner, DO

## 2021-07-22 NOTE — PROGRESS NOTES
Assessment & Plan     Right wrist and forearm pain  The patient's presentation is consistent with a tendinopathy (tenosynovitis), likely of the flexor muscles of the R forearm. Pain is not localized to lateral epicondyle so unlikely to be tennis elbow. Unlikely to be bone fracture or tendon rupture given slow onset of pain and atraumatic mechanism of injury. It is possible that the carpal tunnel of the R wrist led patient to use compensatory mechanisms, putting stress on the RUE.  - Recommend rest to affected arm. Will give patient note for employer stating he should not lift more than 15 lbs and should not perform repetitive motions for the next two weeks.  - Advised patient to wear wrist brace when able, such as when at home.  - Return to clinic in 2 weeks for f/u.    Carpal tunnel syndrome of right wrist  The patient continues to have symptoms of carpal tunnel in the R wrist. Hydrocortisone shots have helped but have not resolved the symptoms. He is not able to consistently receive or perform hand therapy. Given he has started to have intermittent weakness, will refer to hand surgery to see if carpal tunnel release is indicated.   - Placed referral for Hand Surgery to evaluate for need for surgery    Encounter for immunization  Patient does not have documented history of Hep B vaccine. He elected to receive the vaccine rather than start with antibody testing.  - HEPATITIS B VACCINE,ADULT,IM    Return in about 1 week (around 7/29/2021).    Kavita Walker, MS3    I was present with the medical student who participated in the service and in the documentation of this note. I have verified the history and personally performed the physical exam and medical decision making, and have verified the content of the note, which accurately reflects my assessment of the patient and the plan of care.   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    Patient presents with:  Swelling: Right wrist swelling, patient has hx of carpal tunnel, noticed swelling starting to go up the arm for the last week. Patient has noticed hand goes numb when holding things    HPI     For the last year, patient has noticed R arm swelling, pain, and limited ROM. This has been getting worse, particularly in the last few months. Of note, he has known carpal tunnel in both wrists and has had carpal tunnel release surgery on the left wrist. In the R arm, he reports sharp pain during various movements, limited ROM of the R hand, and occasional numbness of the R hand after prolonged carrying of heavy objects. He does not remember any trauma or injury to the area.. He does regularly perform repetitive movements at work, such as chopping vegetables and carrying heavy boxes.    PAST MEDICAL HISTORY:   Past Medical History:   Diagnosis Date     Arthritis      Depressive disorder        PAST SURGICAL HISTORY:   Past Surgical History:   Procedure Laterality Date     BREAST SURGERY       ENT SURGERY  10/24/2017    Plastic Nose Repair     RELEASE CARPAL TUNNEL Left 2/5/2021    Procedure: Left wrist open transverse carpal ligament release;  Surgeon: Theo Montenegro MD;  Location: [facility] OR     TRANSGENDER MASTECTOMY Bilateral 1/8/2020    Procedure: Bilateral Simple Mastectomy, with Nipple Grafts. OnQ;  Surgeon: Sheree Bear MD;  Location: [facility] OR       FAMILY HISTORY:   Family History   Problem Relation Age of Onset     Diabetes Mother      Mental Illness Mother      Diabetes Maternal Grandmother      Breast Cancer Maternal Grandmother      Mental Illness Maternal Grandmother      Mental Illness Maternal Grandfather      Mental Illness Brother      Glaucoma No family hx of      Macular Degeneration No family hx of        SOCIAL HISTORY:   Social History     Tobacco Use     Smoking status: Light Tobacco Smoker     Packs/day: 0.50     Years: 21.00     Pack years: 10.50     Types:  Cigarettes     Smokeless tobacco: Never Used     Tobacco comment: using nicotine inhaler which helps   Substance Use Topics     Alcohol use: Yes     Comment: rarely        Review of Systems   Constitutional, HEENT, cardiovascular, pulmonary, gi and gu systems are negative, except as otherwise noted.      Objective    /88 (BP Location: Left arm, Patient Position: Sitting, Cuff Size: Adult Regular)   Pulse 102   Temp 98  F (36.7  C) (Oral)   Wt 88.6 kg (195 lb 6.4 oz)   SpO2 99%   Breastfeeding No   BMI 31.54 kg/m    Body mass index is 31.54 kg/m .  Physical Exam   GENERAL: healthy, alert and no distress  RESP: breathing comfortably on RA  MS: RUE exam shows limited extension and flexion of wrist, forearm pain on pronation and flexion, tenderness to palpation of lateral wrist. Negative for tenderness of medial or lateral epicondyle.  strength diminished bilaterally. MSK exam otherwise normal.  NEURO: decreased sensation to light touch on R hand. Neuro exam otherwise negative.  PSYCH: mentation appears normal, affect normal/bright

## 2021-07-23 NOTE — PATIENT INSTRUCTIONS
Nice to meet you today!    Please return in 1-2 weeks so we can re-evaluate how your pain is doing after resting at work.   I have placed a referral to hand surgery - they should call you to schedule an appointment.     Leigh Ann Warner, DO

## 2021-08-01 NOTE — PROGRESS NOTES
SUBJECTIVE:  Jesus Hurd is a 40 year old adult who is seen in consultation at the request of Dr. Warner, for Right hand problems x 1 year, swelling first thing noted  Symptoms: worse than the left side was.  Has numbness and tingling in radial 3 digits.  Other symptoms include cramping in thenar eminence and dropping things.   In the R arm, he reports sharp pain during various movements, limited ROM of the R hand, and occasional numbness of the R hand after prolonged carrying of heavy objects.     Treatment: Has tried night time splinting, corticosteroid injection in carpal tunnel, (and also into the ECU)    Hand therapy, and compression.     History of LEFT carpal tunnel release earlier this year that was fairly successful. Numbness resolved.  Still weak.     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 OnQ;  Surgeon: Sheree Bear MD;  Location: [facility] OR        REVIEW OF SYSTEMS:  CONSTITUTIONAL:  NEGATIVE for fever, chills, change in weight  INTEGUMENTARY/SKIN:  NEGATIVE for worrisome rashes, moles or lesions  EYES:  NEGATIVE for vision changes or irritation  ENT/MOUTH:  NEGATIVE for ear, mouth and throat problems  RESP:  NEGATIVE for significant cough or SOB  BREAST:  NEGATIVE for masses, tenderness or discharge  CV:  NEGATIVE for chest pain, palpitations or peripheral edema  GI:  NEGATIVE for nausea, abdominal pain, heartburn, or change in bowel habits  :  Negative   MUSCULOSKELETAL:  See HPI above  NEURO:  See HPI above  ENDOCRINE:  NEGATIVE for  temperature intolerance, skin/hair changes  HEME/ALLERGY/IMMUNE:  NEGATIVE for bleeding problems  PSYCHIATRIC:  NEGATIVE for changes in mood or affect    EXAM:  GENERAL APPEARANCE: healthy, alert and no distress   GAIT: NORMAL  PSYCH:  mentation appears normal and affect normal/bright  SKIN: no suspicious lesions or rashes  NEURO: reflexes normal in upper extremities.   Vascular: Good capp refill and pulses  RESP: No increased work of breathing  LYMPH:  No lymphedema    MSK/Neuro:   strength: markedly decreased,   negative thenar fasciculations.  Thenar atrophy:Mild.   Sensation diminished in  radial 3 digits,     Range of Motion wrist, digits: All Normal  Special tests: Tinel's positive, Phalen's positive.    EMG: from 3/8/21 Mild CTS Right side.    ASSESSMENT/PLAN  Mild Carpal tunnel syndrome based on EMG  significant symptoms, responded briefly to carpal tunnel corticosteroid injection     We talked about the options, which included activity modification, night splinting, therapy, corticosteroid injection, medrol dose pack, and carpal tunnel release.      We decided to proceed with carpal tunnel release on the right side .          Right Carpal tunnel release:  We discussed the procedure of open carpal tunnel release. I explained that I do this under MAC anesthesia, and may be a bit different that his left carpal tunnel release. We discussed the risks, which include but are not limited to incisional tenderness/pillar pain, infection, minor or major neurovascular injury, tendon laceration, finger stiffness and failure to relieve symptoms.  We also discussed the alternatives, including nonsurgical options, and alternatives surgical options such as minimally invasive carpal tunnel release.  We discussed the pros and cons of open carpal tunnel release versus minimally invasive carpal tunnel release.        DARBY Cunningham MD  Dept. Orthopedic Surgery  [facility]

## 2021-08-03 ENCOUNTER — PREP FOR PROCEDURE (OUTPATIENT)
Dept: ORTHOPEDICS | Facility: CLINIC | Age: 41
End: 2021-08-03

## 2021-08-03 ENCOUNTER — OFFICE VISIT (OUTPATIENT)
Dept: ORTHOPEDICS | Facility: CLINIC | Age: 41
End: 2021-08-03
Payer: COMMERCIAL

## 2021-08-03 VITALS
BODY MASS INDEX: 32.49 KG/M2 | HEIGHT: 65 IN | DIASTOLIC BLOOD PRESSURE: 82 MMHG | WEIGHT: 195 LBS | HEART RATE: 62 BPM | SYSTOLIC BLOOD PRESSURE: 120 MMHG

## 2021-08-03 DIAGNOSIS — G56.01 RIGHT CARPAL TUNNEL SYNDROME: Primary | ICD-10-CM

## 2021-08-03 DIAGNOSIS — R20.2 NUMBNESS AND TINGLING OF RIGHT ARM: ICD-10-CM

## 2021-08-03 DIAGNOSIS — R20.0 NUMBNESS AND TINGLING OF RIGHT ARM: ICD-10-CM

## 2021-08-03 DIAGNOSIS — G56.01 CARPAL TUNNEL SYNDROME OF RIGHT WRIST: Primary | ICD-10-CM

## 2021-08-03 PROCEDURE — 99243 OFF/OP CNSLTJ NEW/EST LOW 30: CPT | Performed by: ORTHOPAEDIC SURGERY

## 2021-08-03 ASSESSMENT — PAIN SCALES - GENERAL: PAINLEVEL: MODERATE PAIN (4)

## 2021-08-03 ASSESSMENT — MIFFLIN-ST. JEOR: SCORE: 1565.39

## 2021-08-03 NOTE — LETTER
8/3/2021         RE: Jesus Hurd  [address] [place] [address]        Dear Colleague,    Thank you for referring your patient, Jesus Hurd, to the [facility]. Please see a copy of my visit note below.    SUBJECTIVE:  Jesus Hurd is a 40 year old adult who is seen in consultation at the request of Dr. Warner, for Right hand problems x 1 year, swelling first thing noted  Symptoms: worse than the left side was.  Has numbness and tingling in radial 3 digits.  Other symptoms include cramping in thenar eminence and dropping things.   In the R arm, he reports sharp pain during various movements, limited ROM of the R hand, and occasional numbness of the R hand after prolonged carrying of heavy objects.     Treatment: Has tried night time splinting, corticosteroid injection in carpal tunnel, (and also into the ECU)    Hand therapy, and compression.     History of LEFT carpal tunnel release earlier this year that was fairly successful. Numbness resolved.  Still weak.     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 OnQ;  Surgeon: Sheree Bear MD;  Location: [facility] OR        REVIEW OF SYSTEMS:  CONSTITUTIONAL:  NEGATIVE for fever, chills, change in weight  INTEGUMENTARY/SKIN:  NEGATIVE for worrisome rashes, moles or lesions  EYES:  NEGATIVE for vision changes or irritation  ENT/MOUTH:  NEGATIVE for ear, mouth and throat problems  RESP:  NEGATIVE for significant cough or SOB  BREAST:  NEGATIVE for masses, tenderness or  discharge  CV:  NEGATIVE for chest pain, palpitations or peripheral edema  GI:  NEGATIVE for nausea, abdominal pain, heartburn, or change in bowel habits  :  Negative   MUSCULOSKELETAL:  See HPI above  NEURO:  See HPI above  ENDOCRINE:  NEGATIVE for temperature intolerance, skin/hair changes  HEME/ALLERGY/IMMUNE:  NEGATIVE for bleeding problems  PSYCHIATRIC:  NEGATIVE for changes in mood or affect    EXAM:  GENERAL APPEARANCE: healthy, alert and no distress   GAIT: NORMAL  PSYCH:  mentation appears normal and affect normal/bright  SKIN: no suspicious lesions or rashes  NEURO: reflexes normal in upper extremities.   Vascular: Good capp refill and pulses  RESP: No increased work of breathing  LYMPH:  No lymphedema    MSK/Neuro:   strength: markedly decreased,   negative thenar fasciculations.  Thenar atrophy:Mild.   Sensation diminished in  radial 3 digits,     Range of Motion wrist, digits: All Normal  Special tests: Tinel's positive, Phalen's positive.    EMG: from 3/8/21 Mild CTS Right side.    ASSESSMENT/PLAN  Mild Carpal tunnel syndrome based on EMG  significant symptoms, responded briefly to carpal tunnel corticosteroid injection     We talked about the options, which included activity modification, night splinting, therapy, corticosteroid injection, medrol dose pack, and carpal tunnel release.      We decided to proceed with carpal tunnel release on the right side .          Right Carpal tunnel release:  We discussed the procedure of open carpal tunnel release. I explained that I do this under MAC anesthesia, and may be a bit different that his left carpal tunnel release. We discussed the risks, which include but are not limited to incisional tenderness/pillar pain, infection, minor or major neurovascular injury, tendon laceration, finger stiffness and failure to relieve symptoms.  We also discussed the alternatives, including nonsurgical options, and alternatives surgical options such as minimally  invasive carpal tunnel release.  We discussed the pros and cons of open carpal tunnel release versus minimally invasive carpal tunnel release.        DARBY Cunningham MD  Dept. Orthopedic Surgery  [facility]      Again, thank you for allowing me to participate in the care of your patient.        Sincerely,        Chandana Cunningham MD

## 2021-08-04 DIAGNOSIS — Z11.59 ENCOUNTER FOR SCREENING FOR OTHER VIRAL DISEASES: ICD-10-CM

## 2021-08-04 PROBLEM — G56.01 RIGHT CARPAL TUNNEL SYNDROME: Status: ACTIVE | Noted: 2021-08-04

## 2021-08-05 ENCOUNTER — TELEPHONE (OUTPATIENT)
Dept: ORTHOPEDICS | Facility: CLINIC | Age: 41
End: 2021-08-05

## 2021-08-05 NOTE — TELEPHONE ENCOUNTER
Type of surgery: RELEASE, RIGHT CARPAL TUNNEL  CPT 94289   Carpal tunnel syndrome of right wrist G56.01     Numbness and tingling of right arm R20.0, R20.2    Location of surgery: [facility]  Date and time of surgery: 8-27-21  tbd  Surgeon: Dr Cunningham  Pre-Op Appt Date: 8-25-21  Post-Op Appt Date: 9-14-21   Packet sent out: Yes  Pre-cert/Authorization completed:    Per Availity/ICR:  No precertification or post-service clinical review is required for the specific codes you have requested.   Date: 08/05/21    Thank you,   Iqra Angulo   Prior Authorization [facility]  [phone]

## 2021-08-23 ENCOUNTER — ALLIED HEALTH/NURSE VISIT (OUTPATIENT)
Dept: FAMILY MEDICINE | Facility: CLINIC | Age: 41
End: 2021-08-23
Payer: COMMERCIAL

## 2021-08-23 DIAGNOSIS — Z11.59 ENCOUNTER FOR SCREENING FOR OTHER VIRAL DISEASES: ICD-10-CM

## 2021-08-23 PROCEDURE — U0005 INFEC AGEN DETEC AMPLI PROBE: HCPCS

## 2021-08-23 PROCEDURE — U0003 INFECTIOUS AGENT DETECTION BY NUCLEIC ACID (DNA OR RNA); SEVERE ACUTE RESPIRATORY SYNDROME CORONAVIRUS 2 (SARS-COV-2) (CORONAVIRUS DISEASE [COVID-19]), AMPLIFIED PROBE TECHNIQUE, MAKING USE OF HIGH THROUGHPUT TECHNOLOGIES AS DESCRIBED BY CMS-2020-01-R: HCPCS

## 2021-08-23 NOTE — PROGRESS NOTES
Patient was seen today by clinic RN for PCR COVID 19 testing prior to surgery on 8/27/21 at [facility]. RN explained sample collection process and results would be released directly to [facility] and available in [facility] for surgeon. Patient verbalized understanding had no further questions at this time.     Nasal sample collected following [facility] protocol and was delivered to lab.  Kendra Treviño RN

## 2021-08-24 LAB — SARS-COV-2 RNA RESP QL NAA+PROBE: NEGATIVE

## 2021-08-24 RX ORDER — DOCUSATE SODIUM 100 MG/1
CAPSULE, LIQUID FILLED ORAL
COMMUNITY
Start: 2021-07-15

## 2021-08-25 ENCOUNTER — OFFICE VISIT (OUTPATIENT)
Dept: FAMILY MEDICINE | Facility: CLINIC | Age: 41
End: 2021-08-25
Payer: COMMERCIAL

## 2021-08-25 VITALS
OXYGEN SATURATION: 96 % | HEART RATE: 104 BPM | DIASTOLIC BLOOD PRESSURE: 89 MMHG | WEIGHT: 193 LBS | TEMPERATURE: 98.4 F | SYSTOLIC BLOOD PRESSURE: 125 MMHG | BODY MASS INDEX: 32.12 KG/M2

## 2021-08-25 DIAGNOSIS — Z71.89 ADVANCE CARE PLANNING: ICD-10-CM

## 2021-08-25 DIAGNOSIS — G56.01 CARPAL TUNNEL SYNDROME OF RIGHT WRIST: ICD-10-CM

## 2021-08-25 DIAGNOSIS — Z23 ENCOUNTER FOR IMMUNIZATION: ICD-10-CM

## 2021-08-25 DIAGNOSIS — Z01.818 PREOP GENERAL PHYSICAL EXAM: Primary | ICD-10-CM

## 2021-08-25 PROCEDURE — 99214 OFFICE O/P EST MOD 30 MIN: CPT | Mod: 25 | Performed by: STUDENT IN AN ORGANIZED HEALTH CARE EDUCATION/TRAINING PROGRAM

## 2021-08-25 PROCEDURE — 90471 IMMUNIZATION ADMIN: CPT | Performed by: STUDENT IN AN ORGANIZED HEALTH CARE EDUCATION/TRAINING PROGRAM

## 2021-08-25 PROCEDURE — 90746 HEPB VACCINE 3 DOSE ADULT IM: CPT | Performed by: STUDENT IN AN ORGANIZED HEALTH CARE EDUCATION/TRAINING PROGRAM

## 2021-08-25 NOTE — PROGRESS NOTES
[address]  [address] [place] [id number]  Phone: [phone]  Fax: [phone]  Primary Provider: Emerson Machado      PREOPERATIVE EVALUATION:  Today's date: 8/25/2021    Jesus Hurd is a 40 year old adult who presents for a preoperative evaluation.    Surgical Information:  Surgery/Procedure: Carpal tunnel release - right   Surgery Location: [facility] OR  Surgeon: Dr. Chandana Cunningham  Surgery Date: 8/27/2021  Time of Surgery: 8:25am  Where patient plans to recover: At home with family  Fax number for surgical facility: Note does not need to be faxed, will be available electronically in Epic.    Type of Anesthesia Anticipated: local (MAC or General OK if needed)    Assessment & Plan     The proposed surgical procedure is considered LOW risk.    Jesus was seen today for pre-op exam.    Diagnoses and all orders for this visit:    Preop general physical exam    Carpal tunnel syndrome of right wrist    Encounter for immunization  -     HEPATITIS B VACCINE,ADULT,IM    Advance care planning  -     No CPR- Do NOT Intubate    [place]. Pt ok for full code in procedure.     Possible Sleep Apnea:   Sleep study scheduling in process.  Risks and Recommendations:  The patient has the following additional risks and recommendations for perioperative complications:  - Complex social situation.     Medication Instructions:  Patient is to take all scheduled medications on the day of surgery   - SSRIs, SNRIs, TCAs, Antipsychotics: Continue without modification.     RECOMMENDATION:  APPROVAL GIVEN to proceed with proposed procedure, without further diagnostic evaluation.    31 minutes spent on the date of the encounter doing chart review, history and exam, documentation and further activities per the note    Emerson Machado DO, MA  Pronouns: he/him/his    [facility]    Department of  Family Medicine and Community Health     Subjective     HPI related to upcoming procedure: right hand problems for years. Work exacerbated symptoms. Tingling and numbness. Dec  strengths. Affects work, and ADL.     Preop Questions 8/24/2021   1. Have you ever had a heart attack or stroke? No   2. Have you ever had surgery on your heart or blood vessels, such as a stent placement, a coronary artery bypass, or surgery on an artery in your head, neck, heart, or legs? No   3. Do you have chest pain with activity? No   4. Do you have a history of  heart failure? No   5. Do you currently have a cold, bronchitis or symptoms of other infection? No   6. Do you have a cough, shortness of breath, or wheezing? No   7. Do you or anyone in your family have previous history of blood clots? No   8. Do you or does anyone in your family have a serious bleeding problem such as prolonged bleeding following surgeries or cuts? No   9. Have you ever had problems with anemia or been told to take iron pills? No   10. Have you had any abnormal blood loss such as black, tarry or bloody stools, or abnormal vaginal bleeding? No   11. Have you ever had a blood transfusion? No   12. Are you willing to have a blood transfusion if it is medically needed before, during, or after your surgery? Yes   13. Have you or any of your relatives ever had problems with anesthesia? No   14. Do you have sleep apnea, excessive snoring or daytime drowsiness? YES - snoring, has sleep study ordered    14a. Do you have a CPAP machine? No   15. Do you have any artifical heart valves or other implanted medical devices like a pacemaker, defibrillator, or continuous glucose monitor? No   16. Do you have artificial joints? No   17. Are you allergic to latex? No   18. Is there any chance that you may be pregnant? No       Health Care Directive:  Patient does not have a Health Care Directive or Living Will: [place] with   today. ON FILE as on 8/25    Preoperative Review of :   reviewed - controlled substances from prior surgeries. NO recent fills or meds      Status of Chronic Conditions:  DEPRESSION - Patient has a long history of Depression of moderate severity requiring medication for control with recent symptoms being stable..Current symptoms of depression include stress, anxiety.     HYPERLIPIDEMIA - Patient has a long history of significant Hyperlipidemia requiring medication for treatment with recent good control. Patient reports no problems or side effects with the medication.       Review of Systems  CONSTITUTIONAL: NEGATIVE for fever, chills, change in weight  INTEGUMENTARY/SKIN: NEGATIVE for worrisome rashes, moles or lesions  EYES: NEGATIVE for vision changes or irritation  ENT/MOUTH: NEGATIVE for ear, mouth and throat problems  RESP: NEGATIVE for significant cough or SOB  CV: NEGATIVE for chest pain, palpitations or peripheral edema  GI: NEGATIVE for nausea, abdominal pain, heartburn, or change in bowel habits  : NEGATIVE for frequency, dysuria, or hematuria  MUSCULOSKELETAL: NEGATIVE for significant arthralgias or myalgia. Pos tingling in right hand.   NEURO: NEGATIVE for weakness, dizziness  ENDOCRINE: NEGATIVE for temperature intolerance, skin/hair changes  HEME: NEGATIVE for bleeding problems  PSYCHIATRIC: NEGATIVE for changes in mood or affect    Patient Active Problem List    Diagnosis Date Noted     Carpal tunnel syndrome of right wrist 08/25/2021     Priority: Medium     Carpal tunnel syndrome of left wrist 01/19/2021     Priority: Medium     Added automatically from request for surgery [id number]       Right wrist pain 09/24/2020     Priority: Medium     S/P bilateral mastectomy 06/23/2020     Priority: Medium     Tobacco use 12/10/2019     Priority: Medium     Gender dysphoria in adult 11/25/2019     Priority: Medium     Added automatically from request for surgery [id number]       Bipolar 1 disorder (H)  "06/27/2017     Priority: Medium     Anxiety 06/27/2017     Priority: Medium     PTSD (post-traumatic stress disorder) 06/27/2017     Priority: Medium     H/O: attempted suicide 06/27/2017     Priority: Medium     Methamphetamine abuse in remission (H) 06/27/2017     Priority: Medium     Last use was July 28 2014. Relapse Jan 2020 with last use Jan 8, 2020.       Chondromalacia patellae of right knee 06/22/2017     Priority: Medium      Past Medical History:   Diagnosis Date     Arthritis      Depressive disorder      Past Surgical History:   Procedure Laterality Date     BREAST SURGERY       ENT SURGERY  10/24/2017    Plastic Nose Repair     RELEASE CARPAL TUNNEL Left 2/5/2021    Procedure: Left wrist open transverse carpal ligament release;  Surgeon: Theo Montenegro MD;  Location: [facility] OR     TRANSGENDER MASTECTOMY Bilateral 1/8/2020    Procedure: Bilateral Simple Mastectomy, with Nipple Grafts. OnQ;  Surgeon: Sheree Bear MD;  Location: [facility] OR     Current Outpatient Medications   Medication Sig Dispense Refill     ARIPiprazole (ABILIFY) 30 MG tablet Take 30 mg by mouth daily       atorvastatin (LIPITOR) 40 MG tablet Take 1 tablet (40 mg) by mouth daily 90 tablet 1     buPROPion (WELLBUTRIN XL) 150 MG 24 hr tablet Take 150 mg by mouth daily Take with 300mg tab for dose of 450 mg daily  4     buPROPion (WELLBUTRIN XL) 300 MG 24 hr tablet Take 300 mg by mouth daily  1     docusate sodium (COLACE) 100 MG capsule TAKE 1 TO 2 CAPSULES BY MOUTH TWICE DAILY AS NEEDED FOR CONSTIPATION       escitalopram (LEXAPRO) 5 MG tablet TK 1 T PO QD  1     gabapentin (NEURONTIN) 100 MG capsule Take 1 capsule (100 mg) by mouth as needed       gabapentin (NEURONTIN) 300 MG capsule Take 3 capsules (900 mg) by mouth At Bedtime AND 2 capsules (600 mg) every morning.  0     needle, disp, 18G X 1\" MISC Use to draw up hormones once weekly 25 each 3     Needle, Disp, 25G X 1-1/2\" MISC Use once weekly for administering hormone IM 25 " each 3     senna-docusate (SENOKOT-S/PERICOLACE) 8.6-50 MG tablet Take 1-2 tablets by mouth 2 times daily 30 tablet 0     syringe, disposable, 1 ML MISC Use once weekly to draw up hormones 25 each 3     testosterone cypionate (DEPOTESTOSTERONE) 200 MG/ML injection Inject 0.25 mLs (50 mg) into the muscle once a week 12 mL 1     vitamin D3 (CHOLECALCIFEROL) 50 mcg (2000 units) tablet Take 1 tablet (50 mcg) by mouth daily 90 tablet 1       Allergies   Allergen Reactions     Nickel Itching     Seroquel [Quetiapine] Other (See Comments)     Restless leg syndrom        Social History     Tobacco Use     Smoking status: Light Tobacco Smoker     Packs/day: 0.50     Years: 21.00     Pack years: 10.50     Types: Cigarettes     Smokeless tobacco: Never Used     Tobacco comment: using nicotine inhaler which helps   Substance Use Topics     Alcohol use: Yes     Comment: rarely      Family History   Problem Relation Age of Onset     Diabetes Mother      Mental Illness Mother      Diabetes Maternal Grandmother      Breast Cancer Maternal Grandmother      Mental Illness Maternal Grandmother      Mental Illness Maternal Grandfather      Mental Illness Brother      Glaucoma No family hx of      Macular Degeneration No family hx of      History   Drug Use No     Comment: sober since 2014         Objective     /89 (BP Location: Left arm, Patient Position: Sitting, Cuff Size: Adult Large)   Pulse 104   Temp 98.4  F (36.9  C) (Oral)   Wt 87.5 kg (193 lb)   SpO2 96%   Breastfeeding No   BMI 32.12 kg/m      Physical Exam    GENERAL APPEARANCE: healthy, alert and no distress     EYES: EOMI,  PERRL     HENT: ear canals and TM's normal and nose and mouth without ulcers or lesions     NECK: no adenopathy, no asymmetry, masses, or scars and thyroid normal to palpation     RESP: lungs clear to auscultation - no rales, rhonchi or wheezes     CV: regular rates and rhythm, normal S1 S2, no S3 or S4 and no murmur, click or rub      ABDOMEN:  soft, nontender, no HSM or masses and bowel sounds normal     MS: extremities normal- no gross deformities noted, no evidence of inflammation in joints, FROM in all extremities.     SKIN: no suspicious lesions or rashes     NEURO: Normal strength and tone, sensory exam grossly normal, mentation intact and speech normal     PSYCH: mentation appears normal. and affect normal/bright     LYMPHATICS: No cervical adenopathy    Recent Labs   Lab Test 04/29/21  1140 10/01/20  1057 06/23/20  1705   HGB 16.6* 17.2* 15.3     --  347    133.1 137   POTASSIUM 3.9 3.8 3.8   CR 0.77 0.8 0.91        Diagnostics:  No labs were ordered during this visit.   No EKG required, no history of coronary heart disease, significant arrhythmia, peripheral arterial disease or other structural heart disease.    Revised Cardiac Risk Index (RCRI):  The patient has the following serious cardiovascular risks for perioperative complications:   - No serious cardiac risks = 0 points     RCRI Interpretation: 0 points: Class I (very low risk - 0.4% complication rate)           Signed Electronically by: Emerson Machado DO  Copy of this evaluation report is provided to requesting physician.

## 2021-08-25 NOTE — PATIENT INSTRUCTIONS

## 2021-08-26 ENCOUNTER — ANESTHESIA EVENT (OUTPATIENT)
Dept: SURGERY | Facility: AMBULATORY SURGERY CENTER | Age: 41
End: 2021-08-26

## 2021-08-26 RX ORDER — SODIUM CHLORIDE, SODIUM LACTATE, POTASSIUM CHLORIDE, CALCIUM CHLORIDE 600; 310; 30; 20 MG/100ML; MG/100ML; MG/100ML; MG/100ML
INJECTION, SOLUTION INTRAVENOUS CONTINUOUS
Status: CANCELLED | OUTPATIENT
Start: 2021-08-26

## 2021-08-26 RX ORDER — ONDANSETRON 2 MG/ML
4 INJECTION INTRAMUSCULAR; INTRAVENOUS EVERY 30 MIN PRN
Status: CANCELLED | OUTPATIENT
Start: 2021-08-26

## 2021-08-26 RX ORDER — MEPERIDINE HYDROCHLORIDE 25 MG/ML
12.5 INJECTION INTRAMUSCULAR; INTRAVENOUS; SUBCUTANEOUS
Status: CANCELLED | OUTPATIENT
Start: 2021-08-26

## 2021-08-26 RX ORDER — FENTANYL CITRATE 50 UG/ML
25 INJECTION, SOLUTION INTRAMUSCULAR; INTRAVENOUS EVERY 5 MIN PRN
Status: CANCELLED | OUTPATIENT
Start: 2021-08-26

## 2021-08-26 RX ORDER — ONDANSETRON 4 MG/1
4 TABLET, ORALLY DISINTEGRATING ORAL EVERY 30 MIN PRN
Status: CANCELLED | OUTPATIENT
Start: 2021-08-26

## 2021-08-27 ENCOUNTER — ANESTHESIA (OUTPATIENT)
Dept: SURGERY | Facility: AMBULATORY SURGERY CENTER | Age: 41
End: 2021-08-27

## 2021-08-27 ENCOUNTER — HOSPITAL ENCOUNTER (OUTPATIENT)
Facility: AMBULATORY SURGERY CENTER | Age: 41
Discharge: HOME | End: 2021-08-27
Attending: ORTHOPAEDIC SURGERY | Admitting: ORTHOPAEDIC SURGERY
Payer: COMMERCIAL

## 2021-08-27 VITALS
SYSTOLIC BLOOD PRESSURE: 124 MMHG | TEMPERATURE: 97.9 F | HEART RATE: 89 BPM | RESPIRATION RATE: 18 BRPM | DIASTOLIC BLOOD PRESSURE: 74 MMHG | OXYGEN SATURATION: 97 %

## 2021-08-27 DIAGNOSIS — G56.01 RIGHT CARPAL TUNNEL SYNDROME: ICD-10-CM

## 2021-08-27 DIAGNOSIS — Z98.890 S/P CARPAL TUNNEL RELEASE: Primary | ICD-10-CM

## 2021-08-27 PROCEDURE — 64721 CARPAL TUNNEL SURGERY: CPT | Mod: RT

## 2021-08-27 PROCEDURE — 64721 CARPAL TUNNEL SURGERY: CPT | Mod: RT | Performed by: ORTHOPAEDIC SURGERY

## 2021-08-27 PROCEDURE — G8918 PT W/O PREOP ORDER IV AB PRO: HCPCS

## 2021-08-27 PROCEDURE — G8907 PT DOC NO EVENTS ON DISCHARG: HCPCS

## 2021-08-27 RX ORDER — OXYCODONE HYDROCHLORIDE 5 MG/1
5 TABLET ORAL EVERY 4 HOURS PRN
Status: DISCONTINUED | OUTPATIENT
Start: 2021-08-27 | End: 2021-08-28 | Stop reason: HOSPADM

## 2021-08-27 RX ORDER — CELECOXIB 200 MG/1
200 CAPSULE ORAL
Status: DISCONTINUED | OUTPATIENT
Start: 2021-08-27 | End: 2021-08-28 | Stop reason: HOSPADM

## 2021-08-27 RX ORDER — GABAPENTIN 300 MG/1
300 CAPSULE ORAL
Status: DISCONTINUED | OUTPATIENT
Start: 2021-08-27 | End: 2021-08-28 | Stop reason: HOSPADM

## 2021-08-27 RX ORDER — HYDROCODONE BITARTRATE AND ACETAMINOPHEN 5; 325 MG/1; MG/1
1 TABLET ORAL
Status: DISCONTINUED | OUTPATIENT
Start: 2021-08-27 | End: 2021-08-28 | Stop reason: HOSPADM

## 2021-08-27 RX ORDER — SODIUM CHLORIDE, SODIUM LACTATE, POTASSIUM CHLORIDE, CALCIUM CHLORIDE 600; 310; 30; 20 MG/100ML; MG/100ML; MG/100ML; MG/100ML
INJECTION, SOLUTION INTRAVENOUS CONTINUOUS
Status: DISCONTINUED | OUTPATIENT
Start: 2021-08-27 | End: 2021-08-28 | Stop reason: HOSPADM

## 2021-08-27 RX ORDER — LIDOCAINE 40 MG/G
CREAM TOPICAL
Status: DISCONTINUED | OUTPATIENT
Start: 2021-08-27 | End: 2021-08-28 | Stop reason: HOSPADM

## 2021-08-27 RX ORDER — HYDROCODONE BITARTRATE AND ACETAMINOPHEN 5; 325 MG/1; MG/1
1-2 TABLET ORAL EVERY 4 HOURS PRN
Qty: 20 TABLET | Refills: 0 | Status: SHIPPED | OUTPATIENT
Start: 2021-08-27 | End: 2021-11-03

## 2021-08-27 RX ORDER — CEFAZOLIN SODIUM 2 G/100ML
2 INJECTION, SOLUTION INTRAVENOUS SEE ADMIN INSTRUCTIONS
Status: DISCONTINUED | OUTPATIENT
Start: 2021-08-27 | End: 2021-08-28 | Stop reason: HOSPADM

## 2021-08-27 RX ORDER — CEFAZOLIN SODIUM 2 G/100ML
2 INJECTION, SOLUTION INTRAVENOUS
Status: DISCONTINUED | OUTPATIENT
Start: 2021-08-27 | End: 2021-08-28 | Stop reason: HOSPADM

## 2021-08-27 RX ORDER — ACETAMINOPHEN 325 MG/1
975 TABLET ORAL ONCE
Status: DISCONTINUED | OUTPATIENT
Start: 2021-08-27 | End: 2021-08-28 | Stop reason: HOSPADM

## 2021-08-27 NOTE — OP NOTE
OPERATIVE REPORT    DATE OF SERVICE:  August 27, 2021       PREOPERATIVE DIAGNOSIS  Right carpal tunnel syndrome.      POSTOPERATIVE DIAGNOSIS  Right carpal tunnel syndrome.      NAME OF OPERATION  Right carpal tunnel release.     SURGEON  Chandana Cunningham MD     FIRST ASSISTANT  DAYANA Antoine.     ANESTHESIA: local    ESTIMATED BLOOD LOSS: 2cc    Complications: none    Specimen: none    INDICATIONS  The patient is a 40 year old adult with pain, cramping and numbness in a median nerve distribution. He has been dropping things.   Mild right carpal tunnel syndrome based on EMG, but the symptoms have been quite bothersome.  Conservative treatment has failed. He did respond briefly to a carpal tunnel corticosteroid injection.  Informed consent was obtained for the procedure.       PROCEDURE IN DETAIL  The patient was taken to the operating room and placed on the operating table in the supine position.  Tourniquet was placed around the proximal forearm, but was not used for the procedure. Pause for site confirmation performed. Sterile prep to the area was done. Local anesthetic using a combination of 0.25% Marcaine and 1% lidocaine with epinepherine was injected in the anticipated incision site. He had a lot of pain with the injection.   The limb was prepped and draped in the usual sterile fashion.  Pause for site confirmation performed again.     A longitudinal incision was made in the usual location at the base of the palm just ulnar to midline.  The incision was taken down through the skin and subcutaneous tissue carefully to the level of the palmar fascia.  The palmar fascia was then carefully incised, and the transverse carpal ligament was then exposed. The transverse carpal ligament was incised from distal to proximal under direct vision while protecting underlying structures with an elevator.  The TCL was somewhat tight and moderately thick.  Proximally, we protected the underlying structures using a  clamp, and then completed the ligament incision using a tenotomy scissors.  Once I felt that the transverse carpal ligament was released completely, we spread the clamp to make sure that it was in fact completely released.  Minor bleeders were encountered and bovied. The edges of the cut TCL were cauterized. Then, 4-0 nylon sutures placed in a vertical mattress fashion were used to close the skin, and a sterile dressing was applied followed by a volar splint. The patient was then transferred to the hospital cart and to the recovery area in stable condition.    DARBY Cunningham MD  Dept. Orthopedic Surgery  [facility]

## 2021-09-14 ENCOUNTER — OFFICE VISIT (OUTPATIENT)
Dept: ORTHOPEDICS | Facility: CLINIC | Age: 41
End: 2021-09-14
Payer: COMMERCIAL

## 2021-09-14 VITALS
BODY MASS INDEX: 31.96 KG/M2 | DIASTOLIC BLOOD PRESSURE: 85 MMHG | WEIGHT: 191.8 LBS | SYSTOLIC BLOOD PRESSURE: 144 MMHG | HEART RATE: 88 BPM | HEIGHT: 65 IN

## 2021-09-14 DIAGNOSIS — Z98.890 S/P CARPAL TUNNEL RELEASE: Primary | ICD-10-CM

## 2021-09-14 DIAGNOSIS — T81.49XA WOUND INFECTION AFTER SURGERY: ICD-10-CM

## 2021-09-14 PROCEDURE — 99024 POSTOP FOLLOW-UP VISIT: CPT | Performed by: ORTHOPAEDIC SURGERY

## 2021-09-14 RX ORDER — CEPHALEXIN 500 MG/1
500 CAPSULE ORAL 4 TIMES DAILY
Qty: 40 CAPSULE | Refills: 0 | Status: SHIPPED | OUTPATIENT
Start: 2021-09-14 | End: 2021-11-03

## 2021-09-14 ASSESSMENT — MIFFLIN-ST. JEOR: SCORE: 1550.88

## 2021-09-14 NOTE — LETTER
9/14/2021         RE: Jesus Hurd  [address] [place] [address]        Dear Colleague,    Thank you for referring your patient, Jesus Hurd, to the [facility]. Please see a copy of my visit note below.    Jesus Hurd is here for follow up after right  carpal tunnel release on 8/27.    Numbness and tingling have decreased since surgery.    Pain: moderate around incision. Worse than other side was after surgery .     Exam:  Wound looks good, slight redness. No purulent drainage. He is a smoker and removed his splint early.  Tender over the incision site.  Able to make a full fist    Assessment:  Doing fairl status post right carpal tunnel release   Possible early wound infection     Plan:  Sutures were removed today.  Taught scar management techniques.  Discussed splint wear.  Therapy: ordered  Can return to repetitive use at 4-6 weeks.  Return to clinic 2 weeks for wound check  antibiotics ordered x 10 days.      DARBY Cunningham MD  Dept. Orthopedic Surgery  [facility]        Again, thank you for allowing me to participate in the care of your patient.        Sincerely,        Chandana Cunningham MD

## 2021-09-14 NOTE — PROGRESS NOTES
Jesus Hurd is here for follow up after right  carpal tunnel release on 8/27.    Numbness and tingling have decreased since surgery.    Pain: moderate around incision. Worse than other side was after surgery .     Exam:  Wound looks good, slight redness. No purulent drainage. He is a smoker and removed his splint early.  Tender over the incision site.  Able to make a full fist    Assessment:  Doing fairl status post right carpal tunnel release   Possible early wound infection     Plan:  Sutures were removed today.  Taught scar management techniques.  Discussed splint wear.  Therapy: ordered  Can return to repetitive use at 4-6 weeks.  Return to clinic 2 weeks for wound check  antibiotics ordered x 10 days.      DARBY Cunningham MD  Dept. Orthopedic Surgery  [facility]

## 2021-09-25 ENCOUNTER — HEALTH MAINTENANCE LETTER (OUTPATIENT)
Age: 41
End: 2021-09-25

## 2021-10-01 ENCOUNTER — DOCUMENTATION ONLY (OUTPATIENT)
Dept: OTHER | Facility: CLINIC | Age: 41
End: 2021-10-01

## 2021-11-03 ENCOUNTER — OFFICE VISIT (OUTPATIENT)
Dept: FAMILY MEDICINE | Facility: CLINIC | Age: 41
End: 2021-11-03
Payer: COMMERCIAL

## 2021-11-03 VITALS
TEMPERATURE: 98.2 F | SYSTOLIC BLOOD PRESSURE: 123 MMHG | OXYGEN SATURATION: 96 % | BODY MASS INDEX: 31.72 KG/M2 | WEIGHT: 190.6 LBS | RESPIRATION RATE: 16 BRPM | HEART RATE: 109 BPM | DIASTOLIC BLOOD PRESSURE: 87 MMHG

## 2021-11-03 DIAGNOSIS — E55.9 VITAMIN D DEFICIENCY: ICD-10-CM

## 2021-11-03 DIAGNOSIS — F64.0 GENDER DYSPHORIA IN ADULT: Primary | ICD-10-CM

## 2021-11-03 DIAGNOSIS — Z11.3 ROUTINE SCREENING FOR STI (SEXUALLY TRANSMITTED INFECTION): ICD-10-CM

## 2021-11-03 DIAGNOSIS — E78.2 MIXED HYPERLIPIDEMIA: ICD-10-CM

## 2021-11-03 DIAGNOSIS — S62.339S CLOSED BOXER'S FRACTURE, SEQUELA: ICD-10-CM

## 2021-11-03 DIAGNOSIS — Z23 NEED FOR PROPHYLACTIC VACCINATION AND INOCULATION AGAINST INFLUENZA: ICD-10-CM

## 2021-11-03 LAB
ALBUMIN SERPL-MCNC: 3.9 G/DL (ref 3.4–5)
ALP SERPL-CCNC: 108 U/L (ref 40–150)
ALT SERPL W P-5'-P-CCNC: 49 U/L (ref 0–70)
ANION GAP SERPL CALCULATED.3IONS-SCNC: 4 MMOL/L (ref 3–14)
AST SERPL W P-5'-P-CCNC: 30 U/L (ref 0–45)
BILIRUB SERPL-MCNC: 0.2 MG/DL (ref 0.2–1.3)
BUN SERPL-MCNC: 8 MG/DL (ref 7–30)
CALCIUM SERPL-MCNC: 9.4 MG/DL (ref 8.5–10.1)
CHLORIDE BLD-SCNC: 108 MMOL/L (ref 94–109)
CHOLEST SERPL-MCNC: 130 MG/DL
CO2 SERPL-SCNC: 26 MMOL/L (ref 20–32)
CREAT SERPL-MCNC: 0.68 MG/DL (ref 0.52–1.25)
ERYTHROCYTE [DISTWIDTH] IN BLOOD BY AUTOMATED COUNT: 11.9 % (ref 10–15)
FASTING STATUS PATIENT QL REPORTED: NO
GFR SERPL CREATININE-BSD FRML MDRD: >90 ML/MIN/1.73M2
GLUCOSE BLD-MCNC: 79 MG/DL (ref 70–99)
HCT VFR BLD AUTO: 49.5 %
HDLC SERPL-MCNC: 34 MG/DL
HGB BLD-MCNC: 16 G/DL (ref 11.7–17.7)
LDLC SERPL CALC-MCNC: 55 MG/DL
MCH RBC QN AUTO: 30.2 PG (ref 26.5–33)
MCHC RBC AUTO-ENTMCNC: 32.3 G/DL (ref 31.5–36.5)
MCV RBC AUTO: 93 FL (ref 78–100)
NONHDLC SERPL-MCNC: 96 MG/DL
PLATELET # BLD AUTO: 292 10E3/UL (ref 150–450)
POTASSIUM BLD-SCNC: 4 MMOL/L (ref 3.4–5.3)
PROT SERPL-MCNC: 7.9 G/DL (ref 6.8–8.8)
RBC # BLD AUTO: 5.3 10E6/UL (ref 3.8–5.9)
SODIUM SERPL-SCNC: 138 MMOL/L (ref 133–144)
TRIGL SERPL-MCNC: 203 MG/DL
WBC # BLD AUTO: 8.6 10E3/UL (ref 4–11)

## 2021-11-03 PROCEDURE — 87491 CHLMYD TRACH DNA AMP PROBE: CPT | Performed by: STUDENT IN AN ORGANIZED HEALTH CARE EDUCATION/TRAINING PROGRAM

## 2021-11-03 PROCEDURE — 80053 COMPREHEN METABOLIC PANEL: CPT | Performed by: STUDENT IN AN ORGANIZED HEALTH CARE EDUCATION/TRAINING PROGRAM

## 2021-11-03 PROCEDURE — 90686 IIV4 VACC NO PRSV 0.5 ML IM: CPT | Performed by: STUDENT IN AN ORGANIZED HEALTH CARE EDUCATION/TRAINING PROGRAM

## 2021-11-03 PROCEDURE — 84403 ASSAY OF TOTAL TESTOSTERONE: CPT | Mod: KX | Performed by: STUDENT IN AN ORGANIZED HEALTH CARE EDUCATION/TRAINING PROGRAM

## 2021-11-03 PROCEDURE — 85027 COMPLETE CBC AUTOMATED: CPT | Performed by: STUDENT IN AN ORGANIZED HEALTH CARE EDUCATION/TRAINING PROGRAM

## 2021-11-03 PROCEDURE — 90471 IMMUNIZATION ADMIN: CPT | Performed by: STUDENT IN AN ORGANIZED HEALTH CARE EDUCATION/TRAINING PROGRAM

## 2021-11-03 PROCEDURE — 87389 HIV-1 AG W/HIV-1&-2 AB AG IA: CPT | Performed by: STUDENT IN AN ORGANIZED HEALTH CARE EDUCATION/TRAINING PROGRAM

## 2021-11-03 PROCEDURE — 36415 COLL VENOUS BLD VENIPUNCTURE: CPT | Performed by: STUDENT IN AN ORGANIZED HEALTH CARE EDUCATION/TRAINING PROGRAM

## 2021-11-03 PROCEDURE — 87591 N.GONORRHOEAE DNA AMP PROB: CPT | Performed by: STUDENT IN AN ORGANIZED HEALTH CARE EDUCATION/TRAINING PROGRAM

## 2021-11-03 PROCEDURE — 99214 OFFICE O/P EST MOD 30 MIN: CPT | Mod: 25 | Performed by: STUDENT IN AN ORGANIZED HEALTH CARE EDUCATION/TRAINING PROGRAM

## 2021-11-03 PROCEDURE — 80061 LIPID PANEL: CPT | Performed by: STUDENT IN AN ORGANIZED HEALTH CARE EDUCATION/TRAINING PROGRAM

## 2021-11-03 PROCEDURE — 86803 HEPATITIS C AB TEST: CPT | Performed by: STUDENT IN AN ORGANIZED HEALTH CARE EDUCATION/TRAINING PROGRAM

## 2021-11-03 PROCEDURE — 86780 TREPONEMA PALLIDUM: CPT | Performed by: STUDENT IN AN ORGANIZED HEALTH CARE EDUCATION/TRAINING PROGRAM

## 2021-11-03 RX ORDER — NAPROXEN 500 MG/1
500 TABLET ORAL 2 TIMES DAILY WITH MEALS
Qty: 60 TABLET | Refills: 3 | Status: SHIPPED | OUTPATIENT
Start: 2021-11-03 | End: 2021-12-06

## 2021-11-03 RX ORDER — ATORVASTATIN CALCIUM 40 MG/1
40 TABLET, FILM COATED ORAL DAILY
Qty: 90 TABLET | Refills: 3 | Status: SHIPPED | OUTPATIENT
Start: 2021-11-03 | End: 2022-05-04

## 2021-11-03 RX ORDER — CHOLECALCIFEROL (VITAMIN D3) 50 MCG
1 TABLET ORAL DAILY
Qty: 90 TABLET | Refills: 3 | Status: SHIPPED | OUTPATIENT
Start: 2021-11-03 | End: 2024-02-05

## 2021-11-03 RX ORDER — TESTOSTERONE CYPIONATE 200 MG/ML
50 INJECTION, SOLUTION INTRAMUSCULAR WEEKLY
Qty: 12 ML | Refills: 1 | Status: SHIPPED | OUTPATIENT
Start: 2021-11-03 | End: 2022-06-21

## 2021-11-03 ASSESSMENT — PATIENT HEALTH QUESTIONNAIRE - PHQ9: SUM OF ALL RESPONSES TO PHQ QUESTIONS 1-9: 8

## 2021-11-03 NOTE — PROGRESS NOTES
"  Assessment & Plan     Jesus was seen today for recheck and imm/inj.    Diagnoses and all orders for this visit:    Gender dysphoria in adult  -     Testosterone total; Future  -     CBC with platelets; Future  -     Comprehensive Metabolic Panel; Future  -     Lipid panel; Future  -     testosterone cypionate (DEPOTESTOSTERONE) 200 MG/ML injection; Inject 0.25 mLs (50 mg) into the muscle once a week  -     syringe, disposable, 1 ML MISC; Use once weekly to draw up hormones  -     Needle, Disp, 25G X 1-1/2\" MISC; Use once weekly for administering hormone IM  -     needle, disp, 18G X 1\" MISC; Use to draw up hormones once weekly  -     Testosterone total  -     CBC with platelets  -     Comprehensive Metabolic Panel  -     Lipid panel  Stable on current T dose and doing well. Reviewed CBC, CMP, Lipid and stable/ non concerning. T level in process but suspect sable level and will transition to annual monitoring. If T level is out of range, will adjust medication dose and follow up in 2 months.      Closed boxer's fracture, sequela  -     naproxen (NAPROSYN) 500 MG tablet; Take 1 tablet (500 mg) by mouth 2 times daily (with meals)  Reviewed ER note. Provided nsaid. Will follow up with ortho.     Mixed hyperlipidemia  -     atorvastatin (LIPITOR) 40 MG tablet; Take 1 tablet (40 mg) by mouth daily  Refilled med     Vitamin D deficiency  -     vitamin D3 (CHOLECALCIFEROL) 50 mcg (2000 units) tablet; Take 1 tablet (50 mcg) by mouth daily  Refilled med    Routine screening for STI (sexually transmitted infection)  -     HIV Antigen Antibody Combo; Future  -     Treponema Abs w Reflex to RPR and Titer; Future  -     Hepatitis C antibody; Future  -     Chlamydia trachomatis/Neisseria gonorrhoeae by PCR - Clinic Collect; Future  -     HIV Antigen Antibody Combo  -     Treponema Abs w Reflex to RPR and Titer  -     Hepatitis C antibody  -     Chlamydia trachomatis/Neisseria gonorrhoeae by PCR - Clinic Collect  Negative testing " "today.    Need for prophylactic vaccination and inoculation against influenza  -     INFLUENZA VACCINE IM > 6 MONTHS VALENT IIV4 (AFLURIA/FLUZONE)  Flu shot completed today     BMI:   Estimated body mass index is 31.72 kg/m  as calculated from the following:    Height as of 9/14/21: 1.651 m (5' 5\").    Weight as of this encounter: 86.5 kg (190 lb 9.6 oz).   Weight management plan: Discussed healthy diet and exercise guidelines    Follow up for RN visit for COVID vaccine    Emerson Machado DO  [facility] SOY Lee is a 41 year old individual that uses pronouns He/Him/His/Himself that presents today for follow up of:  masculinizing hormone therapy.     Gender identity: male    Any special concerns today?  no current concerns    On hormones?  YES +++ Shot day of the week? Saturday      Due for labs?  Yes      +++ Refills of meds needed?  Yes    Gender affirmation is being sought in these other ways: out in entire life.     ---    Past Surgical History:   Procedure Laterality Date     BREAST SURGERY       ENT SURGERY  10/24/2017    Plastic Nose Repair     RELEASE CARPAL TUNNEL Left 2/5/2021    Procedure: Left wrist open transverse carpal ligament release;  Surgeon: Theo Montenegro MD;  Location: [facility] OR     RELEASE CARPAL TUNNEL Right 8/27/2021    Procedure: RELEASE, RIGHT CARPAL TUNNEL;  Surgeon: Chandana Cunningham MD;  Location: [facility] OR     TRANSGENDER MASTECTOMY Bilateral 1/8/2020    Procedure: Bilateral Simple Mastectomy, with Nipple Grafts. OnQ;  Surgeon: Sheree Bear MD;  Location: [facility] OR       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)     Chondromalacia patellae of right knee     Gender dysphoria in adult     Tobacco use     S/P bilateral mastectomy     Right wrist pain     Carpal tunnel syndrome of left wrist     " "Carpal tunnel syndrome of right wrist       Current Outpatient Medications   Medication Sig Dispense Refill     ARIPiprazole (ABILIFY) 30 MG tablet Take 30 mg by mouth daily        atorvastatin (LIPITOR) 40 MG tablet Take 1 tablet (40 mg) by mouth daily 90 tablet 1     buPROPion (WELLBUTRIN XL) 150 MG 24 hr tablet Take 150 mg by mouth daily Take with 300mg tab for dose of 450 mg daily   4     buPROPion (WELLBUTRIN XL) 300 MG 24 hr tablet Take 300 mg by mouth daily   1     docusate sodium (COLACE) 100 MG capsule TAKE 1 TO 2 CAPSULES BY MOUTH TWICE DAILY AS NEEDED FOR CONSTIPATION       escitalopram (LEXAPRO) 5 MG tablet TK 1 T PO QD  1     gabapentin (NEURONTIN) 100 MG capsule Take 100 mg by mouth as needed        gabapentin (NEURONTIN) 300 MG capsule Take 3 capsules (900 mg) by mouth At Bedtime AND 2 capsules (600 mg) every morning.  0     naproxen (NAPROSYN) 500 MG tablet Take 1 tablet (500 mg) by mouth 2 times daily (with meals) 60 tablet 3     needle, disp, 18G X 1\" MISC Use to draw up hormones once weekly 25 each 3     Needle, Disp, 25G X 1-1/2\" MISC Use once weekly for administering hormone IM 25 each 3     syringe, disposable, 1 ML MISC Use once weekly to draw up hormones 25 each 3     testosterone cypionate (DEPOTESTOSTERONE) 200 MG/ML injection Inject 0.25 mLs (50 mg) into the muscle once a week 12 mL 1     vitamin D3 (CHOLECALCIFEROL) 50 mcg (2000 units) tablet Take 1 tablet (50 mcg) by mouth daily 90 tablet 1       History   Smoking Status     Light Tobacco Smoker     Packs/day: 0.50     Years: 21.00     Types: Cigarettes   Smokeless Tobacco     Never Used     Comment: using nicotine inhaler which helps           Objective    /87   Pulse 109   Temp 98.2  F (36.8  C) (Oral)   Resp 16   Wt 86.5 kg (190 lb 9.6 oz)   SpO2 96%   BMI 31.72 kg/m    Body mass index is 31.72 kg/m .  Physical Exam   General: Alert and oriented, in no acute distress.  Skin: Warm and dry, no abnormalities noted.  Eyes: " Extra-ocular muscles grossly intact, pupils equal.  ENT: Speech intact, nasal passages open, no hearing impairment noted.  CV: No cyanosis or pallor, warm and well perfused.  Respiratory: No respiratory distress, no accessory muscle use.  Neuro: Gait and station normal, comprehension intact. Gross and fine motor skills intact.   Psychiatric: Mood and affect appear normal.   Extremities: Warm, able to move all four extremities at will.       Results from this visit  Results for orders placed or performed in visit on 11/03/21   CBC with platelets     Status: None   Result Value Ref Range    WBC Count 8.6 4.0 - 11.0 10e3/uL    RBC Count 5.30 3.80 - 5.90 10e6/uL    Hemoglobin 16.0 11.7 - 17.7 g/dL    Hematocrit 49.5 %    MCV 93 78 - 100 fL    MCH 30.2 26.5 - 33.0 pg    MCHC 32.3 31.5 - 36.5 g/dL    RDW 11.9 10.0 - 15.0 %    Platelet Count 292 150 - 450 10e3/uL    Narrative    The sex of this patient cannot be reliably determined based on discrepancies in demographics (legal sex, sex assigned at birth, gender identity).  Both male and female reference ranges are provided where applicable.  Careful evaluation of the patient s results as compared to the gender specific reference intervals is required in this setting.    Comprehensive Metabolic Panel     Status: Normal   Result Value Ref Range    Sodium 138 133 - 144 mmol/L    Potassium 4.0 3.4 - 5.3 mmol/L    Chloride 108 94 - 109 mmol/L    Carbon Dioxide (CO2) 26 20 - 32 mmol/L    Anion Gap 4 3 - 14 mmol/L    Urea Nitrogen 8 7 - 30 mg/dL    Creatinine 0.68 0.52 - 1.25 mg/dL    Calcium 9.4 8.5 - 10.1 mg/dL    Glucose 79 70 - 99 mg/dL    Alkaline Phosphatase 108 40 - 150 U/L    AST 30 0 - 45 U/L    ALT 49 0 - 70 U/L    Protein Total 7.9 6.8 - 8.8 g/dL    Albumin 3.9 3.4 - 5.0 g/dL    Bilirubin Total 0.2 0.2 - 1.3 mg/dL    GFR Estimate >90 >60 mL/min/1.73m2    Narrative    The sex of this patient cannot be reliably determined based on discrepancies in demographics (legal  sex, sex assigned at birth, gender identity).  Both male and female reference ranges are provided where applicable.  Careful evaluation of the patient s results as compared to the gender specific reference intervals is required in this setting.    Lipid panel     Status: Abnormal   Result Value Ref Range    Cholesterol 130 <200 mg/dL    Triglycerides 203 (H) <150 mg/dL    Direct Measure HDL 34 (L) >=40 mg/dL    LDL Cholesterol Calculated 55 <=100 mg/dL    Non HDL Cholesterol 96 <130 mg/dL    Patient Fasting > 8hrs? No     Narrative    The sex of this patient cannot be reliably determined based on discrepancies in demographics (legal sex, sex assigned at birth, gender identity).  Both male and female reference ranges are provided where applicable.  Careful evaluation of the patient s results as compared to the gender specific reference intervals is required in this setting.   Cholesterol  Desirable:  <200 mg/dL    Triglycerides  Normal:  Less than 150 mg/dL  Borderline High:  150-199 mg/dL  High:  200-499 mg/dL  Very High:  Greater than or equal to 500 mg/dL    Direct Measure HDL  Female:  Greater than or equal to 50 mg/dL   Male:  Greater than or equal to 40 mg/dL    LDL Cholesterol  Desirable:  <100mg/dL  Above Desirable:  100-129 mg/dL   Borderline High:  130-159 mg/dL   High:  160-189 mg/dL   Very High:  >= 190 mg/dL    Non HDL Cholesterol  Desirable:  130 mg/dL  Above Desirable:  130-159 mg/dL  Borderline High:  160-189 mg/dL  High:  190-219 mg/dL  Very High:  Greater than or equal to 220 mg/dL   HIV Antigen Antibody Combo     Status: Normal   Result Value Ref Range    HIV Antigen Antibody Combo Nonreactive Nonreactive   Treponema Abs w Reflex to RPR and Titer     Status: Normal   Result Value Ref Range    Treponema Antibody Total Nonreactive Nonreactive   Hepatitis C antibody     Status: Normal   Result Value Ref Range    Hepatitis C Antibody Nonreactive Nonreactive    Narrative    Assay performance  characteristics have not been established for newborns, infants, and children.   Chlamydia trachomatis/Neisseria gonorrhoeae by PCR - Clinic Collect     Status: Normal    Specimen: Urine, Voided   Result Value Ref Range    Chlamydia Trachomatis Negative Negative    Neisseria gonorrhoeae Negative Negative

## 2021-11-04 LAB
C TRACH DNA SPEC QL PROBE+SIG AMP: NEGATIVE
HCV AB SERPL QL IA: NONREACTIVE
HIV 1+2 AB+HIV1 P24 AG SERPL QL IA: NONREACTIVE
N GONORRHOEA DNA SPEC QL NAA+PROBE: NEGATIVE
T PALLIDUM AB SER QL: NONREACTIVE

## 2021-11-07 LAB — TESTOST SERPL-MCNC: 303 NG/DL (ref 8–950)

## 2021-12-06 DIAGNOSIS — S62.339S CLOSED BOXER'S FRACTURE, SEQUELA: ICD-10-CM

## 2021-12-06 RX ORDER — NAPROXEN 500 MG/1
500 TABLET ORAL 2 TIMES DAILY WITH MEALS
Qty: 60 TABLET | Refills: 3 | Status: SHIPPED | OUTPATIENT
Start: 2021-12-06 | End: 2023-08-01

## 2022-01-10 DIAGNOSIS — M25.531 RIGHT WRIST PAIN: Primary | ICD-10-CM

## 2022-01-10 RX ORDER — IBUPROFEN 600 MG/1
TABLET, FILM COATED ORAL
COMMUNITY
Start: 2021-12-23 | End: 2022-01-10

## 2022-01-10 RX ORDER — IBUPROFEN 600 MG/1
600 TABLET, FILM COATED ORAL EVERY 8 HOURS PRN
Qty: 90 TABLET | Refills: 3 | Status: SHIPPED | OUTPATIENT
Start: 2022-01-10

## 2022-05-04 DIAGNOSIS — E78.2 MIXED HYPERLIPIDEMIA: ICD-10-CM

## 2022-05-04 RX ORDER — ATORVASTATIN CALCIUM 40 MG/1
40 TABLET, FILM COATED ORAL DAILY
Qty: 90 TABLET | Refills: 3 | Status: SHIPPED | OUTPATIENT
Start: 2022-05-04 | End: 2023-08-01

## 2022-05-04 NOTE — TELEPHONE ENCOUNTER
"Request for medication refill: atorvastatin (LIPITOR) 40 MG tablet    Providers if patient needs an appointment and you are willing to give a one month supply please refill for one month and  send a letter/MyChart using \".SMILLIMITEDREFILL\" .smillimited and route chart to \"P [facility] \" (Giving one month refill in non controlled medications is strongly recommended before denial)    If refill has been denied, meaning absolutely no refills without visit, please complete the smart phrase \".smirxrefuse\" and route it to the \"P [facility] MED REFILLS\"  pool to inform the patient and the pharmacy.    Terrie Blandon        "

## 2022-05-07 ENCOUNTER — HEALTH MAINTENANCE LETTER (OUTPATIENT)
Age: 42
End: 2022-05-07

## 2022-06-14 DIAGNOSIS — F64.0 GENDER DYSPHORIA IN ADULT: ICD-10-CM

## 2022-06-14 NOTE — TELEPHONE ENCOUNTER
"Request for medication refill:  Needle, Disp, 25G X 1-1/2\" MISC    Providers if patient needs an appointment and you are willing to give a one month supply please refill for one month and  send a letter/MyChart using \".SMILLIMITEDREFILL\" .smillimited and route chart to \"P [facility] \" (Giving one month refill in non controlled medications is strongly recommended before denial)    If refill has been denied, meaning absolutely no refills without visit, please complete the smart phrase \".smirxrefuse\" and route it to the \"P [facility] MED REFILLS\"  pool to inform the patient and the pharmacy.    Tamie Tse MA        "

## 2022-07-28 DIAGNOSIS — F64.0 GENDER DYSPHORIA IN ADULT: ICD-10-CM

## 2022-07-28 NOTE — TELEPHONE ENCOUNTER
"Request for medication refill:  needle, disp, 18G X 1\" MISC    Providers if patient needs an appointment and you are willing to give a one month supply please refill for one month and  send a letter/MyChart using \".SMILLIMITEDREFILL\" .smillimited and route chart to \"P [facility] \" (Giving one month refill in non controlled medications is strongly recommended before denial)    If refill has been denied, meaning absolutely no refills without visit, please complete the smart phrase \".smirxrefuse\" and route it to the \"P [facility] MED REFILLS\"  pool to inform the patient and the pharmacy.    Tamie Tse MA        "

## 2022-08-31 ENCOUNTER — HOSPITAL ENCOUNTER (EMERGENCY)
Facility: CLINIC | Age: 42
Discharge: HOME | End: 2022-08-31
Attending: FAMILY MEDICINE | Admitting: FAMILY MEDICINE
Payer: COMMERCIAL

## 2022-08-31 VITALS
TEMPERATURE: 98 F | DIASTOLIC BLOOD PRESSURE: 72 MMHG | SYSTOLIC BLOOD PRESSURE: 105 MMHG | RESPIRATION RATE: 16 BRPM | OXYGEN SATURATION: 97 % | HEART RATE: 89 BPM

## 2022-08-31 DIAGNOSIS — R46.89 AGGRESSIVE BEHAVIOR OF ADULT: ICD-10-CM

## 2022-08-31 DIAGNOSIS — R41.82 ALTERED MENTAL STATUS, UNSPECIFIED ALTERED MENTAL STATUS TYPE: ICD-10-CM

## 2022-08-31 LAB
ALBUMIN SERPL-MCNC: 3.7 G/DL (ref 3.4–5)
ALP SERPL-CCNC: 92 U/L (ref 40–150)
ALT SERPL W P-5'-P-CCNC: 35 U/L (ref 0–70)
ANION GAP SERPL CALCULATED.3IONS-SCNC: 4 MMOL/L (ref 3–14)
AST SERPL W P-5'-P-CCNC: 25 U/L (ref 0–45)
BASOPHILS # BLD AUTO: 0 10E3/UL (ref 0–0.2)
BASOPHILS NFR BLD AUTO: 0 %
BILIRUB SERPL-MCNC: 0.5 MG/DL (ref 0.2–1.3)
BUN SERPL-MCNC: 15 MG/DL (ref 7–30)
CALCIUM SERPL-MCNC: 9.1 MG/DL (ref 8.5–10.1)
CHLORIDE BLD-SCNC: 109 MMOL/L (ref 94–109)
CK SERPL-CCNC: 262 U/L (ref 30–300)
CO2 SERPL-SCNC: 25 MMOL/L (ref 20–32)
CREAT SERPL-MCNC: 0.73 MG/DL (ref 0.66–1.25)
EOSINOPHIL # BLD AUTO: 0 10E3/UL (ref 0–0.7)
EOSINOPHIL NFR BLD AUTO: 0 %
ERYTHROCYTE [DISTWIDTH] IN BLOOD BY AUTOMATED COUNT: 12 % (ref 10–15)
GFR SERPL CREATININE-BSD FRML MDRD: 71 ML/MIN/1.73M2
GLUCOSE BLD-MCNC: 101 MG/DL (ref 70–99)
HCT VFR BLD AUTO: 47.3 % (ref 40–53)
HGB BLD-MCNC: 16.2 G/DL (ref 13.3–17.7)
HOLD SPECIMEN: NORMAL
HOLD SPECIMEN: NORMAL
IMM GRANULOCYTES # BLD: 0.1 10E3/UL
IMM GRANULOCYTES NFR BLD: 1 %
LYMPHOCYTES # BLD AUTO: 1.5 10E3/UL (ref 0.8–5.3)
LYMPHOCYTES NFR BLD AUTO: 11 %
MCH RBC QN AUTO: 31.5 PG (ref 26.5–33)
MCHC RBC AUTO-ENTMCNC: 34.2 G/DL (ref 31.5–36.5)
MCV RBC AUTO: 92 FL (ref 78–100)
MONOCYTES # BLD AUTO: 0.9 10E3/UL (ref 0–1.3)
MONOCYTES NFR BLD AUTO: 7 %
NEUTROPHILS # BLD AUTO: 10.8 10E3/UL (ref 1.6–8.3)
NEUTROPHILS NFR BLD AUTO: 81 %
NRBC # BLD AUTO: 0 10E3/UL
NRBC BLD AUTO-RTO: 0 /100
PLATELET # BLD AUTO: 272 10E3/UL (ref 150–450)
POTASSIUM BLD-SCNC: 3.8 MMOL/L (ref 3.4–5.3)
PROT SERPL-MCNC: 7.6 G/DL (ref 6.8–8.8)
RBC # BLD AUTO: 5.15 10E6/UL (ref 4.4–5.9)
SODIUM SERPL-SCNC: 138 MMOL/L (ref 133–144)
TSH SERPL DL<=0.005 MIU/L-ACNC: 2.21 MU/L (ref 0.4–4)
WBC # BLD AUTO: 13.3 10E3/UL (ref 4–11)

## 2022-08-31 PROCEDURE — 80053 COMPREHEN METABOLIC PANEL: CPT | Performed by: FAMILY MEDICINE

## 2022-08-31 PROCEDURE — 250N000011 HC RX IP 250 OP 636: Performed by: FAMILY MEDICINE

## 2022-08-31 PROCEDURE — 84443 ASSAY THYROID STIM HORMONE: CPT | Performed by: FAMILY MEDICINE

## 2022-08-31 PROCEDURE — 96372 THER/PROPH/DIAG INJ SC/IM: CPT | Performed by: FAMILY MEDICINE

## 2022-08-31 PROCEDURE — 85025 COMPLETE CBC W/AUTO DIFF WBC: CPT | Performed by: FAMILY MEDICINE

## 2022-08-31 PROCEDURE — 99285 EMERGENCY DEPT VISIT HI MDM: CPT | Performed by: FAMILY MEDICINE

## 2022-08-31 PROCEDURE — 99285 EMERGENCY DEPT VISIT HI MDM: CPT | Mod: 25 | Performed by: FAMILY MEDICINE

## 2022-08-31 PROCEDURE — 36415 COLL VENOUS BLD VENIPUNCTURE: CPT | Performed by: FAMILY MEDICINE

## 2022-08-31 PROCEDURE — 82550 ASSAY OF CK (CPK): CPT | Performed by: FAMILY MEDICINE

## 2022-08-31 RX ORDER — LORAZEPAM 2 MG/ML
2 INJECTION INTRAMUSCULAR ONCE
Status: COMPLETED | OUTPATIENT
Start: 2022-08-31 | End: 2022-08-31

## 2022-08-31 RX ORDER — HALOPERIDOL 5 MG/ML
10 INJECTION INTRAMUSCULAR ONCE
Status: COMPLETED | OUTPATIENT
Start: 2022-08-31 | End: 2022-08-31

## 2022-08-31 RX ADMIN — LORAZEPAM 2 MG: 2 INJECTION INTRAMUSCULAR; INTRAVENOUS at 08:57

## 2022-08-31 RX ADMIN — HALOPERIDOL LACTATE 10 MG: 5 INJECTION, SOLUTION INTRAMUSCULAR at 08:56

## 2022-08-31 ASSESSMENT — ACTIVITIES OF DAILY LIVING (ADL)
ADLS_ACUITY_SCORE: 35

## 2022-08-31 NOTE — ED NOTES
Pt awake in room. Reported their name to be Jesus JOE 84. Pt stated they do not have any identification on them. DEC notified, MD notified.     SSN obtained- registration updated   no

## 2022-08-31 NOTE — ED NOTES
Client comes in by  and [facility] [employer], yelling, fighting against restraints, threatening to kill staff. Client unable to follow commands and contract for safety towards staff and themself. MD orders medication (see mar) and restraints. Code 21 called, code team briefed on situation. Safe BCS techniques used to transfer client to bed and safely apply restraints. Client cooperative.
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
## 2022-08-31 NOTE — ED NOTES
Within an hour after restraint an in person face to face assessment was completed at 0840, including an evaluation of the patient's immediate reaction to the intervention, behavioral assessment and review/assessment of history, drugs and medications, recent labs, etc., and behavioral condition.  The patient experienced: No adverse physical outcome from seclusion/restraint initiation.  The intervention of restraint or seclusion needs to continue.     Neo Valero MD  08/31/22 [id number]

## 2022-08-31 NOTE — ED TRIAGE NOTES
Triage Assessment     Row Name 08/31/22 0941       Triage Assessment (Adult)    Airway WDL WDL       Respiratory WDL    Respiratory WDL WDL       Skin Circulation/Temperature WDL    Skin Circulation/Temperature WDL WDL       Cardiac WDL    Cardiac WDL WDL       Peripheral/Neurovascular WDL    Peripheral Neurovascular WDL WDL       Cognitive/Neuro/Behavioral WDL    Cognitive/Neuro/Behavioral WDL X;mood/behavior;orientation;speech    Orientation disoriented to;place;situation    Mood/Behavior angry;anxious;combative;excitable;hyperactive (agitated, impulsive);restless;threatening       [place] Coma Scale    Best Eye Response 4-->(E4) spontaneous    Best Motor Response 6-->(M6) obeys commands    Best Verbal Response 4-->(V4) confused    [place] Coma Scale Score 14

## 2022-09-01 NOTE — ED PROVIDER NOTES
Patient received as sign-out at change of shift.  Please see initial note for complete details.  41 year old adult who presented to the ED with agitation requiring chemical restraint/sedation with Haldol and Ativan.  Awaiting awakening and reassessment.  Acute events during this shift:  1930-patient awake, alert, and conversant.  He is resting comfortably in bed and no longer agitated.  He is able to recall the events of this morning.  He does endorse methamphetamine use.  He denies any ongoing hallucinations here in the emergency department.  He denies any paranoia.  He denies any depression or suicidal ideation.  He does report that his whole family has incarcerated but he intends to talk to a  about that.  Aside from that, he does not express any delusional thoughts or ongoing psychiatric needs.  He reports intent to seek outpatient chemical dependency treatment.  He states that he has resources in place.  He was provided additional resources here.  He states that he contacted his fiancée who plans to come to get him here in the emergency department.  At 2030, his fiancée presented to the emergency department and the patient was discharged with her.     Paddy Reed MD  08/31/22 2041

## 2022-09-01 NOTE — ED NOTES
Discharge education provided to the pt and pt's SO. Questions answered. All personal belongings returned to the pt. VSS on RA, pt ambulated off unit.

## 2022-09-01 NOTE — DISCHARGE INSTRUCTIONS
Follow-up with your psychiatrist for refill of your medications.  Follow-up for outpatient chemical dependency assessment and treatment.    Return to the emergency department if you feel unsafe or for other concerns.

## 2022-09-05 ENCOUNTER — HOSPITAL ENCOUNTER (EMERGENCY)
Facility: CLINIC | Age: 42
Discharge: HOME | End: 2022-09-05
Attending: EMERGENCY MEDICINE | Admitting: EMERGENCY MEDICINE
Payer: COMMERCIAL

## 2022-09-05 VITALS
HEART RATE: 90 BPM | TEMPERATURE: 98.2 F | SYSTOLIC BLOOD PRESSURE: 109 MMHG | OXYGEN SATURATION: 99 % | RESPIRATION RATE: 15 BRPM | DIASTOLIC BLOOD PRESSURE: 74 MMHG | HEIGHT: 65 IN

## 2022-09-05 DIAGNOSIS — R45.1 AGITATION: ICD-10-CM

## 2022-09-05 DIAGNOSIS — R40.4 TRANSIENT ALTERATION OF AWARENESS: ICD-10-CM

## 2022-09-05 LAB
ALBUMIN SERPL-MCNC: 3.6 G/DL (ref 3.4–5)
ALP SERPL-CCNC: 99 U/L (ref 40–150)
ALT SERPL W P-5'-P-CCNC: 43 U/L (ref 0–70)
ANION GAP SERPL CALCULATED.3IONS-SCNC: 6 MMOL/L (ref 3–14)
AST SERPL W P-5'-P-CCNC: 33 U/L (ref 0–45)
BASOPHILS # BLD AUTO: 0.1 10E3/UL (ref 0–0.2)
BASOPHILS NFR BLD AUTO: 0 %
BILIRUB SERPL-MCNC: 0.4 MG/DL (ref 0.2–1.3)
BUN SERPL-MCNC: 12 MG/DL (ref 7–30)
CALCIUM SERPL-MCNC: 8.5 MG/DL (ref 8.5–10.1)
CHLORIDE BLD-SCNC: 108 MMOL/L (ref 94–109)
CK SERPL-CCNC: 718 U/L (ref 30–300)
CO2 SERPL-SCNC: 25 MMOL/L (ref 20–32)
CREAT SERPL-MCNC: 0.95 MG/DL (ref 0.52–1.25)
EOSINOPHIL # BLD AUTO: 0.1 10E3/UL (ref 0–0.7)
EOSINOPHIL NFR BLD AUTO: 1 %
ERYTHROCYTE [DISTWIDTH] IN BLOOD BY AUTOMATED COUNT: 11.9 % (ref 10–15)
GFR SERPL CREATININE-BSD FRML MDRD: >90 ML/MIN/1.73M2
GLUCOSE BLD-MCNC: 100 MG/DL (ref 70–99)
HCT VFR BLD AUTO: 46.2 % (ref 35–53)
HGB BLD-MCNC: 15.6 G/DL (ref 11.7–17.7)
IMM GRANULOCYTES # BLD: 0 10E3/UL
IMM GRANULOCYTES NFR BLD: 0 %
LYMPHOCYTES # BLD AUTO: 2.3 10E3/UL (ref 0.8–5.3)
LYMPHOCYTES NFR BLD AUTO: 20 %
MCH RBC QN AUTO: 31.1 PG (ref 26.5–33)
MCHC RBC AUTO-ENTMCNC: 33.8 G/DL (ref 31.5–36.5)
MCV RBC AUTO: 92 FL (ref 78–100)
MONOCYTES # BLD AUTO: 1 10E3/UL (ref 0–1.3)
MONOCYTES NFR BLD AUTO: 9 %
NEUTROPHILS # BLD AUTO: 8.1 10E3/UL (ref 1.6–8.3)
NEUTROPHILS NFR BLD AUTO: 70 %
NRBC # BLD AUTO: 0 10E3/UL
NRBC BLD AUTO-RTO: 0 /100
PLATELET # BLD AUTO: 293 10E3/UL (ref 150–450)
POTASSIUM BLD-SCNC: 4 MMOL/L (ref 3.4–5.3)
PROT SERPL-MCNC: 7.6 G/DL (ref 6.8–8.8)
RBC # BLD AUTO: 5.02 10E6/UL (ref 3.8–5.9)
SODIUM SERPL-SCNC: 139 MMOL/L (ref 133–144)
TSH SERPL DL<=0.005 MIU/L-ACNC: 1.84 MU/L (ref 0.4–4)
WBC # BLD AUTO: 11.6 10E3/UL (ref 4–11)

## 2022-09-05 PROCEDURE — 36415 COLL VENOUS BLD VENIPUNCTURE: CPT | Performed by: EMERGENCY MEDICINE

## 2022-09-05 PROCEDURE — 99285 EMERGENCY DEPT VISIT HI MDM: CPT | Mod: 25 | Performed by: EMERGENCY MEDICINE

## 2022-09-05 PROCEDURE — 93010 ELECTROCARDIOGRAM REPORT: CPT | Performed by: EMERGENCY MEDICINE

## 2022-09-05 PROCEDURE — 250N000011 HC RX IP 250 OP 636: Performed by: EMERGENCY MEDICINE

## 2022-09-05 PROCEDURE — 99291 CRITICAL CARE FIRST HOUR: CPT | Mod: 25 | Performed by: EMERGENCY MEDICINE

## 2022-09-05 PROCEDURE — 93005 ELECTROCARDIOGRAM TRACING: CPT | Performed by: EMERGENCY MEDICINE

## 2022-09-05 PROCEDURE — 80053 COMPREHEN METABOLIC PANEL: CPT | Performed by: EMERGENCY MEDICINE

## 2022-09-05 PROCEDURE — 82550 ASSAY OF CK (CPK): CPT | Performed by: EMERGENCY MEDICINE

## 2022-09-05 PROCEDURE — 96375 TX/PRO/DX INJ NEW DRUG ADDON: CPT | Performed by: EMERGENCY MEDICINE

## 2022-09-05 PROCEDURE — 96374 THER/PROPH/DIAG INJ IV PUSH: CPT | Performed by: EMERGENCY MEDICINE

## 2022-09-05 PROCEDURE — 85025 COMPLETE CBC W/AUTO DIFF WBC: CPT | Performed by: EMERGENCY MEDICINE

## 2022-09-05 PROCEDURE — 84443 ASSAY THYROID STIM HORMONE: CPT | Performed by: EMERGENCY MEDICINE

## 2022-09-05 RX ORDER — LORAZEPAM 2 MG/ML
2 INJECTION INTRAMUSCULAR ONCE
Status: COMPLETED | OUTPATIENT
Start: 2022-09-05 | End: 2022-09-05

## 2022-09-05 RX ORDER — OLANZAPINE 10 MG/1
5 INJECTION, POWDER, LYOPHILIZED, FOR SOLUTION INTRAMUSCULAR ONCE
Status: COMPLETED | OUTPATIENT
Start: 2022-09-05 | End: 2022-09-05

## 2022-09-05 RX ADMIN — LORAZEPAM 2 MG: 2 INJECTION INTRAMUSCULAR at 20:16

## 2022-09-05 RX ADMIN — OLANZAPINE 5 MG: 10 INJECTION, POWDER, LYOPHILIZED, FOR SOLUTION INTRAMUSCULAR at 20:16

## 2022-09-05 ASSESSMENT — ACTIVITIES OF DAILY LIVING (ADL)
ADLS_ACUITY_SCORE: 35
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
## 2022-09-05 NOTE — ED NOTES
Bed: ED01  Expected date: 9/5/22  Expected time: 5:57 PM  Means of arrival: Ambulance  Comments:  Sean 613 40 yo M aggressive. Rec'd 200 mg ketamine by ems

## 2022-09-06 LAB
ATRIAL RATE - MUSE: 108 BPM
DIASTOLIC BLOOD PRESSURE - MUSE: NORMAL MMHG
INTERPRETATION ECG - MUSE: NORMAL
P AXIS - MUSE: 67 DEGREES
PR INTERVAL - MUSE: 148 MS
QRS DURATION - MUSE: 82 MS
QT - MUSE: 332 MS
QTC - MUSE: 444 MS
R AXIS - MUSE: 51 DEGREES
SYSTOLIC BLOOD PRESSURE - MUSE: NORMAL MMHG
T AXIS - MUSE: 55 DEGREES
VENTRICULAR RATE- MUSE: 108 BPM

## 2022-09-06 NOTE — ED NOTES
Pt observed from video monitoring without shirt, with IV push emergency exit. Floor psych associate and security notified immediately.

## 2022-09-06 NOTE — ED NOTES
"Pt left his room to hallway and headed the exit door. Pt wanted to leave hospital or \"go his primary care\". Pt very agitated and refused to follow instructions. Multiple staff including ED provider attempted to talk  to him in attempt to returned him to his room. Pt refused and code 21 called. MD ordered medications and and administered. 5 points restraints placed. No physical injures to patient or staff. 1:1 staff sitter placed for safety.  "

## 2022-09-06 NOTE — ED NOTES
At 20:35 pt was reassessed per protocols, pt calm and sleeping. Vitals obtained and stable. Pt agreed to safety behaviors and  restraints were discontinued. Pt remained 1:1 for safety.
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
## 2022-09-06 NOTE — CONSULTS
"Attempted to assess pt. Pt is currently not assessable due to confusion and delusion. Reports he took methamphetamine earlier this morning. Pt reports he can \"see everything\" and believes he arrived to the ED on a spaceship. Pt was unwilling to answer assessment questions and should be evaluated once he clears up.   "

## 2022-09-06 NOTE — ED NOTES
Pt walked out of room, saying he wanted to go for a walk. 1:1 sam tried to redirect pt back into the room. Pt walked towards exit doors by room 20 and sam again tried to redirect pt back into room. Pt slammed his body against the doors. Sitter tried to grab patients arm to move him from the door but the pt then slammed his body against the door again, which caused the door to open and pt ran out of the department. Sitter chased pt down the [address]quez. Pt managed to get out exit doors to the building and ran outside. Sam followed pt until he was off the hospital grounds, by the gas station across the street. Security showed up and told sam and two other psych associates who were following to disengage.

## 2022-09-21 ENCOUNTER — TELEPHONE (OUTPATIENT)
Dept: PSYCHOLOGY | Facility: CLINIC | Age: 42
End: 2022-09-21

## 2022-09-21 NOTE — TELEPHONE ENCOUNTER
Called Jesus to follow up on missed appointment with Dr. Machado today. Has been in the ER recently and wanted to check-in.  No answer. Left generic message asking Jesus to call the clinic to reschedule an appt with Dr. Machado.

## 2022-12-26 ENCOUNTER — HEALTH MAINTENANCE LETTER (OUTPATIENT)
Age: 42
End: 2022-12-26

## 2023-03-03 ENCOUNTER — TRANSFERRED RECORDS (OUTPATIENT)
Dept: HEALTH INFORMATION MANAGEMENT | Facility: CLINIC | Age: 43
End: 2023-03-03

## 2023-03-06 DIAGNOSIS — F64.0 GENDER DYSPHORIA IN ADULT: ICD-10-CM

## 2023-03-06 NOTE — TELEPHONE ENCOUNTER
[facility] phone call message- patient requesting a refill:    Full Medication Name: testosterone cypionate (DEPOTESTOSTERONE) 200 MG/ML injection        Pharmacy confirmed as   [facility]  [address]  [place] [address]  P: [phone]    : Yes    Additional Comments: Caroline from [facility] is requesting a refill. PT needs before Friday.  If there are any questions or problems please call Caroline at [phone]     OK to leave a message on voice mail? Yes    Primary language: English      needed? No    Call taken on March 6, 2023 at 12:13 PM by Helga Padron

## 2023-03-07 RX ORDER — TESTOSTERONE CYPIONATE 200 MG/ML
50 INJECTION, SOLUTION INTRAMUSCULAR WEEKLY
Qty: 4 ML | Refills: 0 | Status: SHIPPED | OUTPATIENT
Start: 2023-03-07 | End: 2023-08-01

## 2023-03-07 NOTE — TELEPHONE ENCOUNTER
"Request for medication refill:  testosterone cypionate (DEPOTESTOSTERONE) 200 MG/ML injection       Providers if patient needs an appointment and you are willing to give a one month supply please refill for one month and  send a letter/MyChart using \".SMILLIMITEDREFILL\" .smillimited and route chart to \"P SMI \" (Giving one month refill in non controlled medications is strongly recommended before denial)    If refill has been denied, meaning absolutely no refills without visit, please complete the smart phrase \".smirxrefuse\" and route it to the \"P SMI MED REFILLS\"  pool to inform the patient and the pharmacy.    Donovan Jay MA        "

## 2023-03-08 DIAGNOSIS — F64.0 GENDER DYSPHORIA IN ADULT: ICD-10-CM

## 2023-03-08 NOTE — TELEPHONE ENCOUNTER
"[facility] phone call message- patient requesting a refill:    Full Medication Name: Needle, Disp, 25G X 1-1/2\" MISC    needle, disp, 18G X 1\" [facility]    Pharmacy confirmed as       [facility] [place] [address] - [place], [place] - [address]  [address]  [place] [address]  Phone: [phone] Fax: [phone]  : Yes    Additional Comments: The pharmacy called to request refills.    OK to leave a message on voice mail? Yes    Primary language: English      needed? No    Call taken on March 8, 2023 at 1:54 PM by Helga Padron  "

## 2023-03-08 NOTE — TELEPHONE ENCOUNTER
"Request for medication refill: needle, disp, 18G X 1\" MISC, Needle, Disp, 25G X 1-1/2\" MISC    Providers if patient needs an appointment and you are willing to give a one month supply please refill for one month and  send a letter/MyChart using \".SMILLIMITEDREFILL\" .smillimited and route chart to \"P SMI \" (Giving one month refill in non controlled medications is strongly recommended before denial)    If refill has been denied, meaning absolutely no refills without visit, please complete the smart phrase \".smirxrefuse\" and route it to the \"P SMI MED REFILLS\"  pool to inform the patient and the pharmacy.    Natalie Leone, [facility]      "

## 2023-03-13 DIAGNOSIS — F64.0 GENDER DYSPHORIA IN ADULT: ICD-10-CM

## 2023-03-13 NOTE — TELEPHONE ENCOUNTER
"Request for medication refill:  syringe, disposable, 1 ML MISC  Providers if patient needs an appointment and you are willing to give a one month supply please refill for one month and  send a letter/MyChart using \".SMILLIMITEDREFILL\" .smillimited and route chart to \"P SMI \" (Giving one month refill in non controlled medications is strongly recommended before denial)    If refill has been denied, meaning absolutely no refills without visit, please complete the smart phrase \".smirxrefuse\" and route it to the \"P SMI MED REFILLS\"  pool to inform the patient and the pharmacy.    Marcy Garsia RN        "

## 2023-03-13 NOTE — TELEPHONE ENCOUNTER
[facility] phone call message- patient requesting a refill:    Full Medication Name: syringe, disposable, 1 ML [facility]      Pharmacy confirmed as       [facility] [place] [address] - [place], [place] - [address]  [address]  [place] [address]  Phone: [phone] Fax: [phone]  : Yes    Additional Comments: The pt is requesting a refill.     OK to leave a message on voice mail? Yes    Primary language: English      needed? No    Call taken on March 13, 2023 at 10:28 AM by Helga Padron

## 2023-03-29 PROBLEM — M50.20 CERVICAL DISC HERNIATION: Status: ACTIVE | Noted: 2023-03-06

## 2023-06-02 ENCOUNTER — HEALTH MAINTENANCE LETTER (OUTPATIENT)
Age: 43
End: 2023-06-02

## 2023-07-25 NOTE — TELEPHONE ENCOUNTER
RECEIVED TO OP ROOM 9 VIA STRECTHCER FROM INT. RADIOLOGY. AWAKE AND ALERT. BANDAID TO LEFT FLANK AREA CLEAN DRY AND INTACT. DENIES PAIN AT THIS TIME. WATER AND CRACKERS GIVEN TO PATIENT.    Refilled med.  -MARINA   [id number]

## 2023-08-01 ENCOUNTER — OFFICE VISIT (OUTPATIENT)
Dept: FAMILY MEDICINE | Facility: CLINIC | Age: 43
End: 2023-08-01
Payer: COMMERCIAL

## 2023-08-01 VITALS
OXYGEN SATURATION: 97 % | RESPIRATION RATE: 16 BRPM | WEIGHT: 196.3 LBS | BODY MASS INDEX: 32.71 KG/M2 | DIASTOLIC BLOOD PRESSURE: 52 MMHG | HEART RATE: 66 BPM | SYSTOLIC BLOOD PRESSURE: 90 MMHG | HEIGHT: 65 IN

## 2023-08-01 DIAGNOSIS — F64.0 GENDER DYSPHORIA IN ADULT: Primary | ICD-10-CM

## 2023-08-01 DIAGNOSIS — E78.2 MIXED HYPERLIPIDEMIA: ICD-10-CM

## 2023-08-01 DIAGNOSIS — Z91.89 ENCOUNTER FOR HEPATITIS C VIRUS SCREENING TEST FOR HIGH RISK PATIENT: ICD-10-CM

## 2023-08-01 DIAGNOSIS — R79.89 LOW VITAMIN D LEVEL: ICD-10-CM

## 2023-08-01 DIAGNOSIS — Z11.4 SCREENING FOR HIV (HUMAN IMMUNODEFICIENCY VIRUS): ICD-10-CM

## 2023-08-01 DIAGNOSIS — Z11.3 ROUTINE SCREENING FOR STI (SEXUALLY TRANSMITTED INFECTION): ICD-10-CM

## 2023-08-01 DIAGNOSIS — Z78.9 HISTORY OF INCARCERATION: ICD-10-CM

## 2023-08-01 DIAGNOSIS — Z11.59 ENCOUNTER FOR HEPATITIS C VIRUS SCREENING TEST FOR HIGH RISK PATIENT: ICD-10-CM

## 2023-08-01 DIAGNOSIS — Z23 IMMUNIZATION DUE: ICD-10-CM

## 2023-08-01 LAB
ALBUMIN SERPL BCG-MCNC: 3.8 G/DL (ref 3.5–5.2)
ALP SERPL-CCNC: 54 U/L (ref 35–129)
ALT SERPL W P-5'-P-CCNC: 18 U/L (ref 0–70)
ANION GAP SERPL CALCULATED.3IONS-SCNC: 11 MMOL/L (ref 7–15)
AST SERPL W P-5'-P-CCNC: 27 U/L (ref 0–45)
BILIRUB SERPL-MCNC: 0.2 MG/DL
BUN SERPL-MCNC: 13.3 MG/DL (ref 6–20)
CALCIUM SERPL-MCNC: 8.7 MG/DL (ref 8.6–10)
CHLORIDE SERPL-SCNC: 108 MMOL/L (ref 98–107)
CHOLEST SERPL-MCNC: 191 MG/DL
CREAT SERPL-MCNC: 0.91 MG/DL (ref 0.51–1.17)
DEPRECATED HCO3 PLAS-SCNC: 20 MMOL/L (ref 22–29)
GFR SERPL CREATININE-BSD FRML MDRD: 81 ML/MIN/1.73M2
GLUCOSE SERPL-MCNC: 86 MG/DL (ref 70–99)
HDLC SERPL-MCNC: 39 MG/DL
LDLC SERPL CALC-MCNC: 109 MG/DL
NONHDLC SERPL-MCNC: 152 MG/DL
POTASSIUM SERPL-SCNC: 4.2 MMOL/L (ref 3.4–5.3)
PROT SERPL-MCNC: 6 G/DL (ref 6.4–8.3)
SODIUM SERPL-SCNC: 139 MMOL/L (ref 136–145)
TRIGL SERPL-MCNC: 217 MG/DL

## 2023-08-01 PROCEDURE — 90471 IMMUNIZATION ADMIN: CPT | Performed by: STUDENT IN AN ORGANIZED HEALTH CARE EDUCATION/TRAINING PROGRAM

## 2023-08-01 PROCEDURE — 82306 VITAMIN D 25 HYDROXY: CPT | Performed by: STUDENT IN AN ORGANIZED HEALTH CARE EDUCATION/TRAINING PROGRAM

## 2023-08-01 PROCEDURE — 87591 N.GONORRHOEAE DNA AMP PROB: CPT | Performed by: STUDENT IN AN ORGANIZED HEALTH CARE EDUCATION/TRAINING PROGRAM

## 2023-08-01 PROCEDURE — 90746 HEPB VACCINE 3 DOSE ADULT IM: CPT | Performed by: STUDENT IN AN ORGANIZED HEALTH CARE EDUCATION/TRAINING PROGRAM

## 2023-08-01 PROCEDURE — 80061 LIPID PANEL: CPT | Performed by: STUDENT IN AN ORGANIZED HEALTH CARE EDUCATION/TRAINING PROGRAM

## 2023-08-01 PROCEDURE — 80053 COMPREHEN METABOLIC PANEL: CPT | Performed by: STUDENT IN AN ORGANIZED HEALTH CARE EDUCATION/TRAINING PROGRAM

## 2023-08-01 PROCEDURE — 90472 IMMUNIZATION ADMIN EACH ADD: CPT | Performed by: STUDENT IN AN ORGANIZED HEALTH CARE EDUCATION/TRAINING PROGRAM

## 2023-08-01 PROCEDURE — 86803 HEPATITIS C AB TEST: CPT | Performed by: STUDENT IN AN ORGANIZED HEALTH CARE EDUCATION/TRAINING PROGRAM

## 2023-08-01 PROCEDURE — 36415 COLL VENOUS BLD VENIPUNCTURE: CPT | Performed by: STUDENT IN AN ORGANIZED HEALTH CARE EDUCATION/TRAINING PROGRAM

## 2023-08-01 PROCEDURE — 84403 ASSAY OF TOTAL TESTOSTERONE: CPT | Mod: KX | Performed by: STUDENT IN AN ORGANIZED HEALTH CARE EDUCATION/TRAINING PROGRAM

## 2023-08-01 PROCEDURE — 99215 OFFICE O/P EST HI 40 MIN: CPT | Mod: 25 | Performed by: STUDENT IN AN ORGANIZED HEALTH CARE EDUCATION/TRAINING PROGRAM

## 2023-08-01 PROCEDURE — 87491 CHLMYD TRACH DNA AMP PROBE: CPT | Performed by: STUDENT IN AN ORGANIZED HEALTH CARE EDUCATION/TRAINING PROGRAM

## 2023-08-01 PROCEDURE — 90677 PCV20 VACCINE IM: CPT | Performed by: STUDENT IN AN ORGANIZED HEALTH CARE EDUCATION/TRAINING PROGRAM

## 2023-08-01 RX ORDER — TESTOSTERONE CYPIONATE 200 MG/ML
50 INJECTION, SOLUTION INTRAMUSCULAR WEEKLY
Qty: 4 ML | Refills: 5 | Status: SHIPPED | OUTPATIENT
Start: 2023-08-01 | End: 2023-10-24

## 2023-08-01 NOTE — PROGRESS NOTES
"  Assessment & Plan     Jesus was seen today for follow up.    Diagnoses and all orders for this visit:    Gender dysphoria in adult  -     testosterone cypionate (DEPOTESTOSTERONE) 200 MG/ML injection; Inject 0.25 mLs (50 mg) into the muscle once a week  -     needle, disp, 18G X 1\" MISC; Use to draw up hormones once weekly  -     syringe, disposable, 1 ML MISC; Use once weekly to draw up hormones  -     Needle, Disp, 25G X 1-1/2\" MISC; Use once weekly for administering hormone IM  -     Testosterone total; Future  -     Hemoglobin; Future  -     Comprehensive Metabolic Panel; Future  -     Testosterone total  -     Cancel: Hemoglobin  -     Comprehensive Metabolic Panel  Full labs today given hx of incarceration, wt loss. CMP reassuring. Hgb future, unable to add on.  Total T in process. Last shot two weeks ago. Will return to prior dosing and weekly shots, may need adjustment 2/2 wt loss.     Routine screening for STI (sexually transmitted infection)  -     Chlamydia trachomatis/Neisseria gonorrhoeae by PCR - Clinic Collect; Future  -     Chlamydia trachomatis/Neisseria gonorrhoeae by PCR - Clinic Collect  -     Chlamydia trachomatis/Neisseria gonorrhoeae by PCR - Clinic Collect  -     HIV Antigen Antibody Combo; Future  G/c neg. HIV ordered future.     Screening for HIV (human immunodeficiency virus)  -     HIV Antigen Antibody Combo; Future  Routine screening ordered    History of incarceration  Encounter for hepatitis C virus screening test for high risk patient  -     Hepatitis C Screen Reflex to HCV RNA Quant and Genotype; Future  -     Hepatitis C Screen Reflex to HCV RNA Quant and Genotype  Negative result.    Low vitamin D level  -     Vitamin D Level; Future  -     Vitamin D Level  -     vitamin D3 (CHOLECALCIFEROL) 50 mcg (2000 units) tablet; Take 1 tablet (50 mcg) by mouth daily  Low level. Considered high dose replacement, but will initiate daily med for now to ease pill regimen.     Immunization " "due  -     Pneumococcal 20 Valent Conjugate (Prevnar 20)  -     HEPATITIS B VACCINE ADULT 3 DOSE IM (ENGERIX-B/RECOMBIVAX HB)  Updated shots today      Mixed hyperlipidemia  -     Lipid panel; Future  -     Lipid panel  -     atorvastatin (LIPITOR) 20 MG tablet; Take 1 tablet (20 mg) by mouth daily  Pt stopped meds for a bit, will reinitiate statin at 20mg dose.       47 minutes spent by me on the date of the encounter doing chart review, history and exam, documentation and further activities per the note     Nicotine/Tobacco Cessation:  He reports that he has been smoking vaping device. He has never used smokeless tobacco.  Nicotine/Tobacco Cessation Plan:   Information offered: Patient not interested at this time      BMI:   Estimated body mass index is 32.67 kg/m  as calculated from the following:    Height as of this encounter: 1.651 m (5' 5\").    Weight as of this encounter: 89 kg (196 lb 4.8 oz).   Weight management plan: Discussed healthy diet and exercise guidelines    Emerson Machado DO  [facility] SOY Lee is a 42 year old, presenting for the following health issues:  Follow Up (Refills and check up )      8/1/2023    10:53 AM   Additional Questions   Roomed by Vladimir   Accompanied by Self       HPI     Reconnecting with me as PCP.    Hx incarceration    Sept 2022-Feb 2023, April and May 2023.     HRT:Testosterone  Last shot 2 weeks ago  - 100mg dose in care home    Hx meth use  9 months sober!    Tobacco:  Stopped smoking in Sept 2022. Now only vape and is cutting back.      Objective    BP 90/52   Pulse 66   Resp 16   Ht 1.651 m (5' 5\")   Wt 89 kg (196 lb 4.8 oz)   SpO2 97%   Breastfeeding No   BMI 32.67 kg/m    Body mass index is 32.67 kg/m .  Physical Exam   General: Alert and oriented, in no acute distress.  Skin: Warm and dry, no abnormalities noted.  Eyes: Extra-ocular muscles grossly intact, pupils equal.  ENT: Speech intact, nasal passages open, no hearing " impairment noted.  CV: No cyanosis or pallor, warm and well perfused.  Respiratory: No respiratory distress, no accessory muscle use.  Neuro: Gait and station normal, comprehension intact. Gross and fine motor skills intact.   Psychiatric: Mood and affect appear normal.   Extremities: Warm, able to move all four extremities at will.    Results for orders placed or performed in visit on 08/01/23   Lipid panel     Status: Abnormal   Result Value Ref Range    Cholesterol 191 <200 mg/dL    Triglycerides 217 (H) <150 mg/dL    Direct Measure HDL 39 (L) >=40 mg/dL    LDL Cholesterol Calculated 109 (H) <=100 mg/dL    Non HDL Cholesterol 152 (H) <130 mg/dL    Narrative    Cholesterol  Desirable:  <200 mg/dL    Triglycerides  Normal:  Less than 150 mg/dL  Borderline High:  150-199 mg/dL  High:  200-499 mg/dL  Very High:  Greater than or equal to 500 mg/dL    Direct Measure HDL  Female:  Greater than or equal to 50 mg/dL   Male:  Greater than or equal to 40 mg/dL    LDL Cholesterol  Desirable:  <100mg/dL  Above Desirable:  100-129 mg/dL   Borderline High:  130-159 mg/dL   High:  160-189 mg/dL   Very High:  >= 190 mg/dL    Non HDL Cholesterol  Desirable:  130 mg/dL  Above Desirable:  130-159 mg/dL  Borderline High:  160-189 mg/dL  High:  190-219 mg/dL  Very High:  Greater than or equal to 220 mg/dL   Vitamin D Level     Status: Abnormal   Result Value Ref Range    Vitamin D, Total (25-Hydroxy) 17 (L) 20 - 75 ug/L    Narrative    Season, race, dietary intake, and treatment affect the concentration of 25-hydroxy-Vitamin D. Values may decrease during winter months and increase during summer months. Values 20-29 ug/L may indicate Vitamin D insufficiency and values <20 ug/L may indicate Vitamin D deficiency.    Vitamin D determination is routinely performed by an immunoassay specific for 25 hydroxyvitamin D3.  If an individual is on vitamin D2(ergocalciferol) supplementation, please specify 25 OH vitamin D2 and D3 level  "determination by LCMSMS test VITD23.     Hepatitis C Screen Reflex to HCV RNA Quant and Genotype     Status: Normal   Result Value Ref Range    Hepatitis C Antibody Nonreactive Nonreactive    Narrative    Assay performance characteristics have not been established for newborns, infants, and children.   Comprehensive Metabolic Panel     Status: Abnormal   Result Value Ref Range    Sodium 139 136 - 145 mmol/L    Potassium 4.2 3.4 - 5.3 mmol/L    Chloride 108 (H) 98 - 107 mmol/L    Carbon Dioxide (CO2) 20 (L) 22 - 29 mmol/L    Anion Gap 11 7 - 15 mmol/L    Urea Nitrogen 13.3 6.0 - 20.0 mg/dL    Creatinine 0.91 0.51 - 1.17 mg/dL    Calcium 8.7 8.6 - 10.0 mg/dL    Glucose 86 70 - 99 mg/dL    Alkaline Phosphatase 54 35 - 129 U/L    AST 27 0 - 45 U/L    ALT 18 0 - 70 U/L    Protein Total 6.0 (L) 6.4 - 8.3 g/dL    Albumin 3.8 3.5 - 5.2 g/dL    Bilirubin Total 0.2 <=1.2 mg/dL    GFR Estimate 81 >60 mL/min/1.73m2    Narrative    The generation of reference intervals for this test is currently based on binary male or female sex. If the electronic health record information indicates another gender identity or if Legal Sex is recorded as \"Unknown\", both male and female reference intervals are provided where applicable, and should be considered according to the individual's appropriate clinical context.   Chlamydia trachomatis/Neisseria gonorrhoeae by PCR - Clinic Collect     Status: Normal    Specimen: Throat; Swab   Result Value Ref Range    Chlamydia Trachomatis Negative Negative    Neisseria gonorrhoeae Negative Negative   Chlamydia trachomatis/Neisseria gonorrhoeae by PCR - Clinic Collect     Status: Normal    Specimen: Urine, Voided   Result Value Ref Range    Chlamydia Trachomatis Negative Negative    Neisseria gonorrhoeae Negative Negative                 "

## 2023-08-02 LAB
C TRACH DNA SPEC QL PROBE+SIG AMP: NEGATIVE
C TRACH DNA SPEC QL PROBE+SIG AMP: NEGATIVE
DEPRECATED CALCIDIOL+CALCIFEROL SERPL-MC: 17 UG/L (ref 20–75)
HCV AB SERPL QL IA: NONREACTIVE
N GONORRHOEA DNA SPEC QL NAA+PROBE: NEGATIVE
N GONORRHOEA DNA SPEC QL NAA+PROBE: NEGATIVE

## 2023-08-02 RX ORDER — CHOLECALCIFEROL (VITAMIN D3) 50 MCG
50 TABLET ORAL DAILY
Qty: 90 TABLET | Refills: 3 | Status: SHIPPED | OUTPATIENT
Start: 2023-08-02 | End: 2024-02-05

## 2023-08-02 RX ORDER — ERGOCALCIFEROL 1.25 MG/1
50000 CAPSULE, LIQUID FILLED ORAL WEEKLY
Qty: 12 CAPSULE | Refills: 0 | Status: CANCELLED | OUTPATIENT
Start: 2023-08-02 | End: 2023-10-19

## 2023-08-04 RX ORDER — ATORVASTATIN CALCIUM 20 MG/1
20 TABLET, FILM COATED ORAL DAILY
Qty: 90 TABLET | Refills: 3 | Status: SHIPPED | OUTPATIENT
Start: 2023-08-04 | End: 2024-02-05

## 2023-08-06 LAB — TESTOST SERPL-MCNC: 201 NG/DL (ref 8–950)

## 2023-10-10 NOTE — COMMUNITY RESOURCES LIST (ENGLISH)
10/10/2023   [facility]  N/A  For questions about this resource list or additional care needs, please contact your primary care clinic or care manager.  Phone: [phone]   Email: N/A   Address: [address] [place] [address]   Hours: N/A        Financial Stability       Rent and mortgage payment assistance  1  [place] [employer] - Multifamily Rental Assistance Program Distance: 1.14 miles      Phone/Virtual   [address] [facility] [address] [place] [address]  Language: English  Hours: Mon - Fri 8:00 AM - 5:00 PM Appt. Only  Fees: Free   Phone: (698) 259-1258 Email: m[place].Mirametrix@[facility].mn. Website: https://www.mnMirametrix.gov/index.html     2  [facility] - Security Deposit Assistance Distance: 1.19 miles      In-Person   [address] [facility] [address] [place] [address]  Language: English  Hours: Mon - Fri 8:00 AM - 4:30 PM  Fees: Free   Phone: (237) 273-3562 Email: nancy@[facility]. Website: https://www.[facility]./your-government/departments/health-and-wellness          Food and Nutrition       Food pantry  3  [facility] Distance: 0.7 miles      [facility]   [address] [place] [address]  Language: English, Lao  Hours: Mon 9:00 AM - 6:00 PM Appt. Only, Tue - Fri 9:00 AM - 5:00 PM Appt. Only  Fees: Free   Phone: (957) 836-8854 Email: info@[employer].org Website: http://www.[employer].org/site     4  [facility] [place] Distance: 1.22 miles      [facility]   [address] [place] [address]  Language: English, Hmong, Hungarian, Lao  Hours: Mon 11:00 AM - 3:00 PM , Wed 11:00 AM - 3:00 PM , Fri 11:00 AM - 3:00 PM  Fees: Free, Self Pay   Phone: (550) 635-1529 Email: Ai@[facility].salvationarmy.org Website: http://[facility].org/[facility]/[address]-[place]/     SNAP application assistance  5  [employer] [place] Distance: 0.48 miles       Phone/Virtual   [address] [place] [address]  Language: English, Hmong, Jordanian, Faroese, Gabonese  Hours: Mon - Fri 8:30 AM - 4:30 PM  Fees: Free   Phone: (841) 758-2611 Email: helpline@[employer].org Website: https://www.hungersolutions.org/programs/[place]-food-helpline/     6  [place] Department of Human Services - MNFoodHel[facility] (SNAP) Distance: 1.18 miles      Phone/Virtual   [address] [address] [place] [address]  Language: English, Hmong, Jordanian, Faroese, Gabonese, Pakistani  Hours: Mon - Fri 9:00 AM - 5:00 PM  Fees: Free   Phone: (197) 980-2538 Website: https://[place].gov/dhs/people-we-serve/adults/economic-assistance/food-nutrition/programs-and-services/supplemental-nutrition-assistance-program.jsp     Soup kitchen or free meals  7  [facility] [place] - [facility] - Free Summer Meals Distance: 1.66 miles      In-Person   [address] [facility] [place] [address]  Language: English, Hmong, Gabonese  Hours: Mon - Fri 12:00 PM - 1:00 PM , Mon - Fri 3:00 PM - 4:00 PM  Fees: Free   Phone: (970) 459-6126 Email: Marry@[facility]. Website: https://www.[facility]/departments/castañeda-recreation/[facility]     8  [facility] [place] [facility] Distance: 2.13 miles      [facility]   [address] [place] [address]  Language: English  Hours: Tue 2:00 PM - 4:00 PM , Thu 2:00 PM - 4:00 PM  Fees: Free   Phone: (563) 904-9087 Email: joanna@[facility]. Website: https://www.[facility]/facilities/lbeczw-tlwcichxc-hyvorl          Hotlines and Helplines       Hotline - Housing crisis  9  [facility] Distance: 7.26 miles      Phone/Virtual   [address] [place] [address]  Language: English  Hours: Mon - Sun Open 24 Hours   Phone: (409) 196-1414 Email: communications@oscs-[place].org Website: https://oscs-[place].org/oursaviourshousing/     10  [facility] Distance: 8.85 miles      Phone/Virtual   [address] [place]  [address]  Language: English  Hours: Mon - Sun Open 24 Hours   Phone: (864) 308-5647 Email: info@[employer] Website: http://www.[place].org          Housing       Coordinated Entry access point  11  [facility] Distance: 0.94 miles      In-Person, Phone/Virtual   [address] [place] [address]  Language: English, Greenlandic  Hours: Mon - Fri 8:30 AM - 4:30 PM  Fees: Free   Phone: (577) 308-1515 Email: info@[employer].net Website: https://www.mneMagin.org/locations/downTopekan-clinic/     12  [facility] - Coordinated Access to Housing and Shelter (CAHS) - Coordinated Access Distance: 1.95 miles      In-Person, Phone/Virtual   [address] [place] [address]  Language: English  Hours: Mon - Fri 8:00 AM - 4:30 PM  Fees: Free   Phone: (849) 799-3245 Website: https://www.[facility]./residents/assistance-support/assistance/housing-services-support     Drop-in center or day shelter  13  [facility] Distance: 1.92 miles      In-Person   [address] [place] [address]  Language: English  Hours: Mon - Fri 9:00 AM - 4:00 PM  Fees: Free   Phone: (339) 411-4451 Email: megan@[employer] Website: http://Knowledgestreem     14  [facility] Distance: 7.32 miles      In-Person   [address] [place] [address]  Language: English, Lithuanian, Greenlandic  Hours: Mon - Sat 7:00 AM - 3:00 PM  Fees: Free, Self Pay   Phone: (618) 261-1724 Email: info@[employer].net Website: https://www.[employer].org/locations/opportunity-center/     Housing search assistance  15  [facility] - Housing Search Assistance Distance: 2.56 miles      Phone/Virtual   [address] [place] [address]  Language: Kazakh, English, Hmong, Earlene, Lithuanian, Greenlandic  Hours: Mon - Fri Appt. Only  Fees: Free   Phone: (128) 672-9052 Website:  https://[facility].org/     16  [facility] - Mental Health Crisis Housing Search Assistance Distance: 3.26 miles      In-Person, Phone/Virtual   [address] [facility] [address] [place] [address]  Language: English  Hours: Mon - Tue 8:00 AM - 4:30 PM , Wed 8:00 AM - 6:00 PM , Thu - Fri 8:00 AM - 4:30 PM  Fees: Free   Phone: (401) 204-4003 Email: [facility]@co.[facility]. Website: https://www.[facility]./residents/health-medical/clinics-services/mental-health/adult-mental-health     Shelter for families  17   [facility] Distance: 15.06 miles      In-Person   [address] [place] [address]  Language: English  Hours: Mon - Fri 3:00 PM - 9:00 AM , Sat - Sun Open 24 Hours  Fees: Free   Phone: (955) 538-1255 Ext.1 Website: https://www.saintHeber Springss.RC Transportation/2020/07/03/emergency-family-shelter/     Shelter for individuals  18  [facility] [place] [facility] [place] [facility] Distance: 0.87 miles      In-Person   [address] [place] [address]  Language: English  Hours: Mon - Sun 5:00 PM - 10:00 AM  Fees: Free, Self Pay   Phone: (342) 330-4045 Email: info@[employer] Website: https://www.[facility]ARD.org/locations/[facility]-[place]/     19  [facility] Distance: 7.26 miles      In-Person   [address] [place] [address]  Language: English  Hours: Mon - Sun Open 24 Hours  Fees: Free   Phone: (742) 124-3874 Email: communications@oscs-mn.org Website: https://oscs-mn.org/oursaviourshousing/          Transportation       Free or low-cost transportation  20  [employer] Distance: 4.1 miles      In-Person   [address] [place] [address]  Language: English, Liberian  Hours: Mon 9:00 AM - 5:00 PM , Tue 9:30 AM - 7:00 PM , Wed 9:00 AM - 5:00 PM , Thu 9:30 AM - 7:00 PM , Fri 9:00 AM - 5:00 PM  Fees: Free   Phone: (951) 561-1311 Email:  contactus@[facility].[employer] Website: http://www.Tolera TherapeuticsShoplocal     21  [facility] [place] [facility] Cir[facility] Bus Distance: 4.58 miles      In-Person   [address] [place] [place] [address]  Language: English  Hours: Tue 9:00 AM - 2:00 PM  Fees: Self Pay   Phone: (992) 156-9653 Email: info@[employer] Website: http://www.[employer].org/     Transportation to medical appointments  22  [employer] Medical Transportation - Non-Emergency Medical Transportation Distance: 1.11 miles      In-Person   [address] [place] [address]  Language: English  Hours: Mon - Fri 8:00 AM - 4:00 PM Appt. Only  Fees: Self Pay   Phone: (802) 478-3909 Website: http://www.[place].org/Medical-Services/Emergency-medical-services/Non-emergency-transportation/     23  [employer] Distance: 3.78 miles      In-Person   [address] [address]  Language: English  Hours: Mon - Thu 6:00 AM - 6:00 PM , Fri 6:00 AM - 5:00 PM  Fees: Insurance, Self Pay   Phone: (804) 521-8003 Email: office@[employer] Website: https://www.[employer]/          Important Numbers & Websites       Emergency Services   911  City Services   311  Poison Control   (829) 520-5021  Suicide Prevention Lifeline   (941) 274-3955 (TALK)  Child Abuse Hotline   (236) 677-3565 (4-A-Child)  Sexual Assault Hotline   (153) 759-5885 (HOPE)  National Runaway Safeline   (222) 296-8997 (RUNAWAY)  All-Options Talkline   (895) 997-9866  Substance Abuse Referral   (707) 269-8997 (HELP)

## 2023-10-10 NOTE — COMMUNITY RESOURCES LIST (ENGLISH)
10/10/2023   [facility]  N/A  For questions about this resource list or additional care needs, please contact your primary care clinic or care manager.  Phone: [phone]   Email: N/A   Address: [address] [place] [address]   Hours: N/A        Financial Stability       Rent and mortgage payment assistance  1  [place] Housing Finance Agency - Multifamily Rental Assistance Program Distance: 1.14 miles      Phone/Virtual   [address] [facility] [address] [place] [address]  Language: English  Hours: Mon - Fri 8:00 AM - 5:00 PM Appt. Only  Fees: Free   Phone: (377) 849-7138 Email: mn.MonkeyFind@[facility].mn. Website: https://www.mnMonkeyFind.gov/index.html     2  [facility] Health and Wellness - Security Deposit Assistance Distance: 1.19 miles      In-Person   [address] [facility] [address] [place] [address]  Language: English  Hours: Mon - Fri 8:00 AM - 4:30 PM  Fees: Free   Phone: (444) 103-1413 Email: nancy@[facility]. Website: https://www.[facility]./your-government/departments/health-and-wellness          Food and Nutrition       Food pantry  3  [facility], [facility] Distance: 0.7 miles      [facility], [facility]   [address] [place] [address]  Language: English, Turkmen  Hours: Mon 9:00 AM - 6:00 PM Appt. Only, Tue - Fri 9:00 AM - 5:00 PM Appt. Only  Fees: Free   Phone: (648) 852-5793 Email: info@Invent[employer].org Website: http://www.Inventables.org/site     4  [facility] and Service [place] Distance: 1.22 miles      [facility]   [address] [place] [address]  Language: English, Hmong, Slovenian, Turkmen  Hours: Mon 11:00 AM - 3:00 PM , Wed 11:00 AM - 3:00 PM , Fri 11:00 AM - 3:00 PM  Fees: Free, Self Pay   Phone: (724) 757-4018 Email: Ai@[facility].salvationarmy.org Website: http://[facility] Hospitalynorth.org/[facility]/[address]-[place]/     SNAP application assistance  5  [employer] [place] Distance: 0.48 miles       Phone/Virtual   [address] [place] [address]  Language: English, Hmong, Vietnamese, American, Stateless  Hours: Mon - Fri 8:30 AM - 4:30 PM  Fees: Free   Phone: (923) 925-8735 Email: helpline@hungersolutions.org Website: https://www.hungersolutions.org/programs/[place]-food-helpline/     6  [place] Department of Human Services - MNFoodHelFormerly [facility] (SNAP) Distance: 1.18 miles      Phone/Virtual   [address] [address] [place] [address]  Language: English, Hmong, Vietnamese, American, Stateless, Nigerien  Hours: Mon - Fri 9:00 AM - 5:00 PM  Fees: Free   Phone: (628) 663-4959 Website: https://[place].gov/dhs/people-we-serve/adults/economic-assistance/food-nutrition/programs-and-services/supplemental-nutrition-assistance-program.jsp     Soup kitchen or free meals  7  [facility] [place] [facility] - Free Summer Meals Distance: 1.66 miles      In-Person   [address] [facility] [place] [address]  Language: English, Hmong, Stateless  Hours: Mon - Fri 12:00 PM - 1:00 PM , Mon - Fri 3:00 PM - 4:00 PM  Fees: Free   Phone: (576) 333-1977 Email: Marry@[facility]. Website: https://www.[facility]/departments/castañeda-recreation/[place]     8  [facility] [place] [facility] Distance: 2.13 miles      [facility]   [address] [place] [address]  Language: English  Hours: Tue 2:00 PM - 4:00 PM , Thu 2:00 PM - 4:00 PM  Fees: Free   Phone: (102) 794-1381 Email: joanna@[facility]. Website: https://www.[facility]/facilities/sihjts-lniaksxuf-vhjlzv          Hotlines and Helplines       Hotline - Housing crisis  9  [facility] Distance: 7.26 miles      Phone/Virtual   [address] [place] [address]  Language: English  Hours: Mon - Sun Open 24 Hours   Phone: (771) 867-8584 Email: communications@oscs-mn.org Website: https://oscs-mn.org/oursaviourshousing/     10  [facility] Distance: 8.85 miles      Phone/Virtual   [address] [place]  [address]  Language: English  Hours: Mon - Sun Open 24 Hours   Phone: (311) 597-2106 Email: info@[employer] Website: http://www.miCab.org          Housing       Coordinated Entry access point  11  [facility] Distance: 0.94 miles      In-Person, Phone/Virtual   [address] [place] [address]  Language: English, Kenyan  Hours: Mon - Fri 8:30 AM - 4:30 PM  Fees: Free   Phone: (810) 562-4180 Email: info@Workana.SignalSet Website: https://www.mnCibiem.org/locations/downChristianan-clinic/     12  [facility] - Coordinated Access to Housing and Shelter (CAHS) - Coordinated Access Distance: 1.95 miles      In-Person, Phone/Virtual   [address] [place] [address]  Language: English  Hours: Mon - Fri 8:00 AM - 4:30 PM  Fees: Free   Phone: (194) 841-3084 Website: https://www.[facility]./residents/assistance-support/assistance/housing-services-support     Drop-in center or day shelter  13  [facility] Distance: 1.92 miles      In-Person   [address] [place] [address]  Language: English  Hours: Mon - Fri 9:00 AM - 4:00 PM  Fees: Free   Phone: (829) 561-1176 Email: megan@[employer] Website: http://Light[employer] Materials     14  [facility] Center Distance: 7.32 miles      In-Person   [address] [place] [address]  Language: English, Bangladeshi, Kenyan  Hours: Mon - Sat 7:00 AM - 3:00 PM  Fees: Free, Self Pay   Phone: (135) 950-3433 Email: info@[employer].SignalSet Website: https://www.[employer].org/locations/opportunity-center/     Housing search assistance  15  [facility] - Housing Search Assistance Distance: 2.56 miles      Phone/Virtual   [address] [place] [address]  Language: Arabic, English, Hmong, Earlene, Bangladeshi, Kenyan  Hours: Mon - Fri Appt. Only  Fees: Free   Phone: (175) 392-9148 Website:  https://[facility].org/     16  [facility] - Mental Health Crisis Housing Search Assistance Distance: 3.26 miles      In-Person, Phone/Virtual   [address] [facility] [address] [place] [address]  Language: English  Hours: Mon - Tue 8:00 AM - 4:30 PM , Wed 8:00 AM - 6:00 PM , Thu - Fri 8:00 AM - 4:30 PM  Fees: Free   Phone: (441) 751-3046 Email: [facility]@co.[facility]. Website: https://www.[facility]./residents/health-medical/clinics-services/mental-health/adult-mental-health     Shelter for families  17   [facility] Distance: 15.06 miles      In-Person   [address] [place] [address]  Language: English  Hours: Mon - Fri 3:00 PM - 9:00 AM , Sat - Sun Open 24 Hours  Fees: Free   Phone: (139) 507-8530 Ext.1 Website: https://www.saintDavenports.MetaLogics/2020/07/03/emergency-family-shelter/     Shelter for individuals  18  [facility] - [facility] [place] [facility] - [facility] [place] [facility] Distance: 0.87 miles      In-Person   [address] [place] [address]  Language: English  Hours: Mon - Sun 5:00 PM - 10:00 AM  Fees: Free, Self Pay   Phone: (508) 394-2576 Email: info@Phyzios.MetaLogics Website: https://www.[facility] HospitalBitMethod.org/locations/[facility]-saint-paul/     19  [facility] Distance: 7.26 miles      In-Person   [address] [place] [address]  Language: English  Hours: Mon - Sun Open 24 Hours  Fees: Free   Phone: (854) 154-5223 Email: communications@oscs-mn.org Website: https://oscs-mn.org/oursaviourshousing/          Transportation       Free or low-cost transportation  20  [employer] Distance: 4.1 miles      In-Person   [address] [place] [address]  Language: English, Greenlandic  Hours: Mon 9:00 AM - 5:00 PM , Tue 9:30 AM - 7:00 PM , Wed 9:00 AM - 5:00 PM , Thu 9:30 AM - 7:00 PM , Fri 9:00 AM - 5:00 PM  Fees: Free   Phone: (590) 924-9551 Email:  contactus@[facility].MyFeelBack Website: http://www.VayableAcqua Telecom Ltd     21  [employer] - The [facility] Circulator Bus Distance: 4.58 miles      In-Person   [address] [place] [address]  Language: English  Hours: Tue 9:00 AM - 2:00 PM  Fees: Self Pay   Phone: (289) 130-9944 Email: info@Catheter Connections Website: http://www.Overwolf.org/     Transportation to medical appointments  22  [employer] Medical Transportation - Non-Emergency Medical Transportation Distance: 1.11 miles      In-Person   [address] [place] [address]  Language: English  Hours: Mon - Fri 8:00 AM - 4:00 PM Appt. Only  Fees: Self Pay   Phone: (714) 600-4408 Website: http://www.Casa Couture.org/Medical-Services/Emergency-medical-services/Non-emergency-transportation/     23  Discover Ride Distance: 3.78 miles      In-Person   [address] [address]  Language: English  Hours: Mon - Thu 6:00 AM - 6:00 PM , Fri 6:00 AM - 5:00 PM  Fees: Insurance, Self Pay   Phone: (838) 271-8974 Email: office@[employer] Website: https://www.[employer]/          Important Numbers & Websites       Emergency Services   911  City Services   311  Poison Control   (300) 134-8957  Suicide Prevention Lifeline   (612) 274-1655 (TALK)  Child Abuse Hotline   (702) 877-6484 (4-A-Child)  Sexual Assault Hotline   (615) 669-2204 (HOPE)  National Runaway Safeline   (317) 959-5166 (RUNAWAY)  All-Options Talkline   (793) 383-9269  Substance Abuse Referral   (273) 231-3914 (HELP)

## 2023-10-17 NOTE — COMMUNITY RESOURCES LIST (ENGLISH)
10/17/2023   [facility]  N[facility]A  For questions about this resource list or additional care needs, please contact your primary care clinic or care manager.  Phone: [phone]   Email: N/A   Address: [address] [place] [address]   Hours: N/A        Financial Stability       Rent and mortgage payment assistance  1  [place] Housing Finance Agency - Multifamily Rental Assistance Program Distance: 1.14 miles      Phone/Virtual   [address] [facility] [address] [place] [address]  Language: English  Hours: Mon - Fri 8:00 AM - 5:00 PM Appt. Only  Fees: Free   Phone: (796) 715-8693 Email: mn."[employer]@[facility].mn. Website: https://www.mn"[employer] Inc.".gov/index.html     2  [facility] Health and Wellness - Security Deposit Assistance Distance: 1.19 miles      In-Person   [address] [facility] [address] [place] [address]  Language: English  Hours: Mon - Fri 8:00 AM - 4:30 PM  Fees: Free   Phone: (286) 710-8344 Email: nancy@[facility]. Website: https://www.[facility]./your-government/departments/health-and-wellness          Food and Nutrition       Food pantry  3  [facility], [facility] Distance: 0.7 miles      [facility]   [address] [place] [address]  Language: English, Indonesian  Hours: Mon 9:00 AM - 6:00 PM Appt. Only, Tue - Fri 9:00 AM - 5:00 PM Appt. Only  Fees: Free   Phone: (445) 157-6126 Email: info@Motopia.org Website: http://www.Motopia.org/site     4  [facility] and Service [place] Distance: 1.22 miles      [facility]   [address] [place] [address]  Language: English, Hmong, Polish, Indonesian  Hours: Mon 11:00 AM - 3:00 PM , Wed 11:00 AM - 3:00 PM , Fri 11:00 AM - 3:00 PM  Fees: Free, Self Pay   Phone: (877) 552-5968 Email: Ai@[facility].salvationarmy.org Website: http://[facility] Massachusettsynorth.org/[facility]/[address]-[place]/     SNAP application assistance  5  [employer] [place] Distance: 0.48 miles       Phone/Virtual   [address] [place] [address]  Language: English, Hmong, Mosotho, Belgian, North Korean  Hours: Mon - Fri 8:30 AM - 4:30 PM  Fees: Free   Phone: (390) 708-8705 Email: helpline@hungersolutions.org Website: https://www.hungersolutions.org/programs/[place]-food-helpline/     6  [place] Department of Human Services - MNFoodHelFormerly [facility] (SNAP) Distance: 1.18 miles      Phone/Virtual   [address] [address] [place] [address]  Language: English, Hmong, Mosotho, Belgian, North Korean, Nauruan  Hours: Mon - Fri 9:00 AM - 5:00 PM  Fees: Free   Phone: (916) 906-4691 Website: https://[place].gov/dhs/people-we-serve/adults/economic-assistance/food-nutrition/programs-and-services/supplemental-nutrition-assistance-program.jsp     Soup kitchen or free meals  7  [facility] [place] [facility] - Free Summer Meals Distance: 1.66 miles      In-Person   [address] [facility] [place] [address]  Language: English, Hmong, North Korean  Hours: Mon - Fri 12:00 PM - 1:00 PM , Mon - Fri 3:00 PM - 4:00 PM  Fees: Free   Phone: (632) 205-2999 Email: Marry@[facility]. Website: https://www.[facility]/departments/castañeda-recreation/[place]     8  [facility] [place] [facility] Distance: 2.13 miles      [facility]   [address] [place] [address]  Language: English  Hours: Tue 2:00 PM - 4:00 PM , Thu 2:00 PM - 4:00 PM  Fees: Free   Phone: (769) 810-7287 Email: joanna@[facility]. Website: https://www.[facility]/facilities/slnoik-exbjpwnnr-xclphb          Hotlines and Helplines       Hotline - Housing crisis  9  [facility] Distance: 7.26 miles      Phone/Virtual   [address] [place] [address]  Language: English  Hours: Mon - Sun Open 24 Hours   Phone: (725) 214-1212 Email: communications@oscs-mn.org Website: https://oscs-mn.org/oursaviourshousing/     10  [facility] Distance: 8.85 miles      Phone/Virtual   [address] [place]  [address]  Language: English  Hours: Mon - Sun Open 24 Hours   Phone: (631) 425-3471 Email: info@Curiously Website: http://www.Loftware.org          Housing       Coordinated Entry access point  11  [facility] Distance: 0.94 miles      In-Person, Phone/Virtual   [address] [place] [address]  Language: English, Armenian  Hours: Mon - Fri 8:30 AM - 4:30 PM  Fees: Free   Phone: (894) 470-4594 Email: info@[employer].Com[employer] Rocket Website: https://www.mnEngage Mobility.org/locations/downWentzvillen-clinic/     12  [facility] - Coordinated Access to Housing and Shelter (CAHS) - Coordinated Access Distance: 1.95 miles      In-Person, Phone/Virtual   [address] [place] [address]  Language: English  Hours: Mon - Fri 8:00 AM - 4:30 PM  Fees: Free   Phone: (424) 919-9343 Website: https://www.[facility]./residents/assistance-support/assistance/housing-services-support     Drop-in center or day shelter  13  [facility] Distance: 1.92 miles      In-Person   [address] [place] [address]  Language: English  Hours: Mon - Fri 9:00 AM - 4:00 PM  Fees: Free   Phone: (583) 848-1158 Email: megan@[employer] Website: http://StyroPower     14  [facility] - [facility] Distance: 7.32 miles      In-Person   [address] [place] [address]  Language: English, Congolese, Armenian  Hours: Mon - Sat 7:00 AM - 3:00 PM  Fees: Free, Self Pay   Phone: (113) 533-5511 Email: info@[employer] Website: https://www.[employer].org/locations/opportunity-center/     Housing search assistance  15  [facility] - Housing Search Assistance Distance: 2.56 miles      Phone/Virtual   [address] [place] [address]  Language: French, English, Hmong, Earlene, Congolese, Armenian  Hours: Mon - Fri Appt. Only  Fees: Free   Phone: (928) 959-5492 Website:  https://[facility].org/     16  [facility] - Mental Health Crisis Housing Search Assistance Distance: 3.26 miles      In-Person, Phone/Virtual   [address] [facility] [address] [place] [address]  Language: English  Hours: Mon - Tue 8:00 AM - 4:30 PM , Wed 8:00 AM - 6:00 PM , Thu - Fri 8:00 AM - 4:30 PM  Fees: Free   Phone: (433) 667-9719 Email: [facility]@co.[facility]. Website: https://www.[facility]./residents/health-medical/clinics-services/mental-health/adult-mental-health     Shelter for families  17   [facility] Distance: 15.06 miles      In-Person   [address] [place] [address]  Language: English  Hours: Mon - Fri 3:00 PM - 9:00 AM , Sat - Sun Open 24 Hours  Fees: Free   Phone: (580) 896-4000 Ext.1 Website: https://www.saintHollandales.Crossborders/2020/07/03/emergency-family-shelter/     Shelter for individuals  18  [facility] [place] - [facility] [place] [facility] - [facility] [place] [facility] Distance: 0.87 miles      In-Person   [address] [place] [address]  Language: English  Hours: Mon - Sun 5:00 PM - 10:00 AM  Fees: Free, Self Pay   Phone: (603) 209-5301 Email: info@eSolar.Crossborders Website: https://www.[facility] CenterTalentwise.org/locations/[facility]-saint-paul/     19  [facility] Distance: 7.26 miles      In-Person   [address] [place] [address]  Language: English  Hours: Mon - Sun Open 24 Hours  Fees: Free   Phone: (890) 557-2396 Email: communications@oscs-mn.org Website: https://oscs-mn.org/oursaviourshousing/          Transportation       Free or low-cost transportation  20  [employer] Distance: 4.1 miles      In-Person   [address] [place] [address]  Language: English, Djiboutian  Hours: Mon 9:00 AM - 5:00 PM , Tue 9:30 AM - 7:00 PM , Wed 9:00 AM - 5:00 PM , Thu 9:30 AM - 7:00 PM , Fri 9:00 AM - 5:00 PM  Fees: Free   Phone: (324) 316-1580 Email:  contactus@[facility]EnergyUN[facility].HydroLogex Website: http://www.ThermoEnergyJumpTheClub     21  [employer] - [facility] Circulator Bus Distance: 4.58 miles      In-Person   [address] [place] [address]  Language: English  Hours: Tue 9:00 AM - 2:00 PM  Fees: Self Pay   Phone: (747) 826-3499 Email: info@TherOx Website: http://www.Zura!.org/     Transportation to medical appointments  22  [employer] Medical Transportation - Non-Emergency Medical Transportation Distance: 1.11 miles      In-Person   [address] [place] [address]  Language: English  Hours: Mon - Fri 8:00 AM - 4:00 PM Appt. Only  Fees: Self Pay   Phone: (440) 124-6329 Website: http://www.Livestation.org/Medical-Services/Emergency-medical-services/Non-emergency-transportation/     23  Discover Ride Distance: 3.78 miles      In-Person   [address] [address]  Language: English  Hours: Mon - Thu 6:00 AM - 6:00 PM , Fri 6:00 AM - 5:00 PM  Fees: Insurance, Self Pay   Phone: (314) 958-6371 Email: office@[employer]a Website: https://www.Aplica/          Important Numbers & Websites       Emergency Services   911  City Services   311  Poison Control   (155) 819-5563  Suicide Prevention Lifeline   (215) 557-5230 (TALK)  Child Abuse Hotline   (365) 176-7358 (4-A-Child)  Sexual Assault Hotline   (517) 452-4613 (HOPE)  National Runaway Safeline   (642) 818-1629 (RUNAWAY)  All-Options Talkline   (127) 518-2173  Substance Abuse Referral   (414) 955-6268 (HELP)

## 2023-10-23 NOTE — PROGRESS NOTES
Assessment & Plan     Gender affirmation   S/P bilateral mastectomy   Patient on mHRT since 2017 via injection with shot-day Friday. Interruption in therapy 2023 secondary to incarceration. Restarted 8/2023 and presents for follow-up. Reports desired changes with minimal side effects. Interested in dose increase. Last labs with hyperlipidemia (known, on statin) and low testosterone in setting of being off medication. Will recheck labs today and plan for dose increase if appropriate.   - Continue testosterone cypionate 50 mg weekly; plan for increase to 60 mg if labs appropriate   - If dose increase, return in 3 months for repeat labs     HLD  - Lipids   - Continue Atorvastatin     Depression  Patient with long-standing history of depression exacerbated by recent life stressors, including end of a relationship. No SI/HI. Reports adherence with mood medications and adequate support from outpatient recovery resources. Declined referral for therapy today.   - Encouraged patient to call or return if interested in additional resources     Yahaira Hunt MD  [facility] SOY Lee is a 43 year old, presenting for the following health issues:      HPI          HPI     HRT:  - Been on T since 2017  - Had interruption due to incarceration   - Restarted 8/1/23   - Current regimen: Testosterone 0.25 mL (50 mg) weekly on Friday   - Desired changes: Seeing facial change and fat redistribution   - S/P top surgery   - Mood: Depressed because end of relationship. No SI/HI.         ---    Past Surgical History:   Procedure Laterality Date    BREAST SURGERY      ENT SURGERY  10/24/2017    Plastic Nose Repair    RELEASE CARPAL TUNNEL Left 2/5/2021    Procedure: Left wrist open transverse carpal ligament release;  Surgeon: Theo Montenegro MD;  Location: [facility] OR    RELEASE CARPAL TUNNEL Right 8/27/2021    Procedure: RELEASE, RIGHT CARPAL TUNNEL;  Surgeon: Chandana Cunningham MD;  Location:  OR     "TRANSGENDER MASTECTOMY Bilateral 1/8/2020    Procedure: Bilateral Simple Mastectomy, with Nipple Grafts. OnQ;  Surgeon: Sheree Bear MD;  Location: [facility] OR       Patient Active Problem List   Diagnosis    Bipolar 1 disorder (H)    Anxiety    PTSD (post-traumatic stress disorder)    H/O: attempted suicide    Methamphetamine abuse in remission (H)    Chondromalacia patellae of right knee    Gender dysphoria in adult    Tobacco use    S/P bilateral mastectomy    Right wrist pain    Carpal tunnel syndrome of left wrist    Carpal tunnel syndrome of right wrist    Cervical disc herniation       Current Outpatient Medications   Medication Sig Dispense Refill    ARIPiprazole (ABILIFY) 30 MG tablet Take 30 mg by mouth daily       atorvastatin (LIPITOR) 20 MG tablet Take 1 tablet (20 mg) by mouth daily 90 tablet 3    buPROPion (WELLBUTRIN XL) 150 MG 24 hr tablet Take 150 mg by mouth every morning  4    docusate sodium (COLACE) 100 MG capsule TAKE 1 TO 2 CAPSULES BY MOUTH TWICE DAILY AS NEEDED FOR CONSTIPATION      escitalopram (LEXAPRO) 10 MG tablet Take 10 mg by mouth daily      ibuprofen (ADVIL/MOTRIN) 600 MG tablet Take 1 tablet (600 mg) by mouth every 8 hours as needed for moderate pain 90 tablet 3    needle, disp, 18G X 1\" MISC Use to draw up hormones once weekly 25 each 3    Needle, Disp, 25G X 1-1/2\" MISC Use once weekly for administering hormone IM 25 each 3    syringe, disposable, 1 ML MISC Use once weekly to draw up hormones 25 each 3    testosterone cypionate (DEPOTESTOSTERONE) 200 MG/ML injection Inject 0.25 mLs (50 mg) into the muscle once a week 4 mL 5    topiramate (TOPAMAX) 25 MG tablet Take 25 mg by mouth 2 times daily      vitamin D3 (CHOLECALCIFEROL) 50 mcg (2000 units) tablet Take 1 tablet (50 mcg) by mouth daily 90 tablet 3    vitamin D3 (CHOLECALCIFEROL) 50 mcg (2000 units) tablet Take 1 tablet (50 mcg) by mouth daily 90 tablet 3    VITAMIN D3 25 MCG (1000 UT) tablet Take 25 mcg by mouth daily   "       History   Smoking Status    Every Day    Types: Vaping Device   Smokeless Tobacco    Never            Review of Systems:        General  Fat redistribution: Yes  Weight change: Loss- desired  HEENT  Voice change: Yes- desired      Cardiovascular (CV)  Chest Pains: No  Shortness of breath: No Chest  Decreased exercise tolerance:  No  Breast changes/development: N/A     Gastrointestinal (GI)  Abdominal pain: No  Change in appetite: No Skin  Acne or oily skin: Yes- nothing   Change in hair: No     Genitourinary ()  Abnormal vaginal bleeding: No   Decreased spontaneous erections: N/A  Change in libido: No  New sexual partners: No Musculoskeletal  Leg pain or swelling: No     Psychiatric (Psych)  Depression: Yes- See HPI                  Objective    /79   Pulse 78   Wt 85.8 kg (189 lb 3.2 oz)   SpO2 98%   BMI 31.48 kg/m    Body mass index is 31.48 kg/m .  Physical Exam  Vitals reviewed.   Constitutional:       General: He is not in acute distress.     Appearance: Normal appearance.   HENT:      Head: Normocephalic and atraumatic.   Cardiovascular:      Rate and Rhythm: Normal rate.   Pulmonary:      Effort: Pulmonary effort is normal.   Neurological:      Mental Status: He is alert.   Psychiatric:         Mood and Affect: Mood normal.         Behavior: Behavior normal.                ----- Service Performed and Documented by Resident or Fellow ------

## 2023-10-24 ENCOUNTER — OFFICE VISIT (OUTPATIENT)
Dept: FAMILY MEDICINE | Facility: CLINIC | Age: 43
End: 2023-10-24
Payer: COMMERCIAL

## 2023-10-24 VITALS
DIASTOLIC BLOOD PRESSURE: 79 MMHG | WEIGHT: 189.2 LBS | OXYGEN SATURATION: 98 % | HEART RATE: 78 BPM | SYSTOLIC BLOOD PRESSURE: 113 MMHG | BODY MASS INDEX: 31.48 KG/M2

## 2023-10-24 DIAGNOSIS — F32.A DEPRESSION, UNSPECIFIED DEPRESSION TYPE: ICD-10-CM

## 2023-10-24 DIAGNOSIS — E78.5 HYPERLIPIDEMIA, UNSPECIFIED HYPERLIPIDEMIA TYPE: ICD-10-CM

## 2023-10-24 DIAGNOSIS — F64.0 GENDER DYSPHORIA IN ADULT: Primary | ICD-10-CM

## 2023-10-24 LAB
ALBUMIN SERPL BCG-MCNC: 4.3 G/DL (ref 3.5–5.2)
ALP SERPL-CCNC: 85 U/L (ref 35–129)
ALT SERPL W P-5'-P-CCNC: 20 U/L (ref 0–70)
AST SERPL W P-5'-P-CCNC: 23 U/L (ref 0–45)
BILIRUB DIRECT SERPL-MCNC: <0.2 MG/DL (ref 0–0.3)
BILIRUB SERPL-MCNC: 0.4 MG/DL
CHOLEST SERPL-MCNC: 205 MG/DL
FASTING STATUS PATIENT QL REPORTED: NORMAL
GLUCOSE SERPL-MCNC: 83 MG/DL (ref 70–99)
HDLC SERPL-MCNC: 38 MG/DL
HGB BLD-MCNC: 16.5 G/DL (ref 11.7–17.7)
LDLC SERPL CALC-MCNC: 140 MG/DL
NONHDLC SERPL-MCNC: 167 MG/DL
PROT SERPL-MCNC: 7.4 G/DL (ref 6.4–8.3)
TRIGL SERPL-MCNC: 137 MG/DL

## 2023-10-24 PROCEDURE — 82947 ASSAY GLUCOSE BLOOD QUANT: CPT

## 2023-10-24 PROCEDURE — 85018 HEMOGLOBIN: CPT

## 2023-10-24 PROCEDURE — 80076 HEPATIC FUNCTION PANEL: CPT

## 2023-10-24 PROCEDURE — 36415 COLL VENOUS BLD VENIPUNCTURE: CPT

## 2023-10-24 PROCEDURE — 99214 OFFICE O/P EST MOD 30 MIN: CPT | Mod: GC

## 2023-10-24 PROCEDURE — 84403 ASSAY OF TOTAL TESTOSTERONE: CPT | Mod: KX

## 2023-10-24 PROCEDURE — 80061 LIPID PANEL: CPT

## 2023-10-24 RX ORDER — TESTOSTERONE CYPIONATE 200 MG/ML
50 INJECTION, SOLUTION INTRAMUSCULAR WEEKLY
Qty: 4 ML | Refills: 5 | Status: CANCELLED | OUTPATIENT
Start: 2023-10-24

## 2023-10-24 NOTE — COMMUNITY RESOURCES LIST (ENGLISH)
10/24/2023   [facility]  N/A  For questions about this resource list or additional care needs, please contact your primary care clinic or care manager.  Phone: [phone]   Email: N/A   Address: [address] [place] [address]   Hours: N/A        Financial Stability       Rent and mortgage payment assistance  1  [place] [employer] Agency - Multifamily Rental Assistance Program Distance: 1.14 miles      Phone/Virtual   [address] [facility] [address] [place] [address]  Language: English  Hours: Mon - Fri 8:00 AM - 5:00 PM Appt. Only  Fees: Free   Phone: (394) 149-5945 Email: mn.StyleShare@[facility].mn. Website: https://www.mnStyleShare.gov/index.html     2  [facility] - Security Deposit Assistance Distance: 1.19 miles      In-Person   [address] [facility] [address] [place] [address]  Language: English  Hours: Mon - Fri 8:00 AM - 4:30 PM  Fees: Free   Phone: (538) 921-6027 Email: nancy@[facility]. Website: https://www.[facility]./your-government/departments/health-and-wellness          Food and Nutrition       Food pantry  3  [facility] Distance: 0.7 miles      [facility], [facility]   [address] [place] [address]  Language: English, Hebrew  Hours: Mon 9:00 AM - 6:00 PM Appt. Only, Tue - Fri 9:00 AM - 5:00 PM Appt. Only  Fees: Free   Phone: (432) 330-2067 Email: info@[employer].org Website: http://www.[employer].org/site     4  [facility] [place] Distance: 1.22 miles      [facility]   [address] [place] [address]  Language: English, Hmong, Georgian, Hebrew  Hours: Mon 11:00 AM - 3:00 PM , Wed 11:00 AM - 3:00 PM , Fri 11:00 AM - 3:00 PM  Fees: Free, Self Pay   Phone: (832) 750-6877 Email: Ai@[facility].salvationarmy.org Website: http://[facility].org/[facility]/[address]/     SNAP application assistance  5  [employer] [place] Distance: 0.48 miles       Phone/Virtual   [address] [place] [address]  Language: English, Hmong, Tanzanian, Guatemalan, Singaporean  Hours: Mon - Fri 8:30 AM - 4:30 PM  Fees: Free   Phone: (278) 698-4543 Email: helpline@hungersolutions.org Website: https://www.hungersolutions.org/programs/[place]-food-helpline/     6  [place] Department of Human Services - [facility] (SNAP) Distance: 1.18 miles      Phone/Virtual   [address] [address] [place] [address]  Language: English, Hmong, Tanzanian, Guatemalan, Singaporean, [place]cair[place] Islander  Hours: Mon - Fri 9:00 AM - 5:00 PM  Fees: Free   Phone: (395) 633-6006 Website: https://[place].gov/dhs/people-we-serve/adults/economic-assistance/food-nutrition/programs-and-services/supplemental-nutrition-assistance-program.jsp     Soup kitchen or free meals  7  [facility] [place] [facility] - Free Summer Meals Distance: 1.66 miles      In-Person   [address] [facility] [place] [address]  Language: English, Hmong, Singaporean  Hours: Mon - Fri 12:00 PM - 1:00 PM , Mon - Fri 3:00 PM - 4:00 PM  Fees: Free   Phone: (333) 457-5775 Email: Marry@[facility]. Website: https://www.[facility]/departments/castañeda-recreation/[place]ship     8  [facility] [place] [facility] Distance: 2.13 miles      [facility]   [address] [place] [address]  Language: English  Hours: Tue 2:00 PM - 4:00 PM , Thu 2:00 PM - 4:00 PM  Fees: Free   Phone: (143) 672-2953 Email: joanna@[facility]. Website: https://www.[facility]/facilities/wyqxfk-vqpzjdzhp-jxckdt          Hotlines and Helplines       Hotline - Housing crisis  9  [facility] Distance: 7.26 miles      Phone/Virtual   [address] [place] [address]  Language: English  Hours: Mon - Sun Open 24 Hours   Phone: (169) 376-9360 Email: communications@oscs-mn.org Website: https://oscs-mn.org/oursaviourshousing/     10  [facility] Distance: 8.85 miles      Phone/Virtual   [address] [place]  [address]  Language: English  Hours: Mon - Sun Open 24 Hours   Phone: (589) 179-2659 Email: info@[employer] Website: http://www.[employer].org          Housing       Coordinated Entry access point  11  [facility] Distance: 0.94 miles      In-Person, Phone/Virtual   [address] [place] [address]  Language: English, Peruvian  Hours: Mon - Fri 8:30 AM - 4:30 PM  Fees: Free   Phone: (798) 245-6227 Email: info@[employer].DesRueda.com Website: https://www.mnDCF Technologies.org/locations/downSebringn-clinic/     12  [facility] Coordinated Access to Housing and Shelter (CAHS) - Coordinated Access Distance: 1.95 miles      In-Person, Phone/Virtual   [address] [place] [address]  Language: English  Hours: Mon - Fri 8:00 AM - 4:30 PM  Fees: Free   Phone: (271) 266-9740 Website: https://www.[facility]./residents/assistance-support/assistance/housing-services-support     Drop-in center or day shelter  13  [facility] Distance: 1.92 miles      In-Person   [address] [place] [address]  Language: English  Hours: Mon - Fri 9:00 AM - 4:00 PM  Fees: Free   Phone: (194) 244-9769 Email: megan@[employer] Website: http://KODA     14  [facility] Distance: 7.32 miles      In-Person   [address] [place] [address]  Language: English, Mauritanian, Peruvian  Hours: Mon - Sat 7:00 AM - 3:00 PM  Fees: Free, Self Pay   Phone: (630) 752-9438 Email: info@[employer].DesRueda.com Website: https://www.[employer].org/locations/opportunity-center/     Housing search assistance  15  [facility] - Housing Search Assistance Distance: 2.56 miles      Phone/Virtual   [address] [place] [address]  Language: Latvian, English, Hmong, Earlene, Mauritanian, Peruvian  Hours: Mon - Fri Appt. Only  Fees: Free   Phone: (782) 288-4536 Website:  https://[facility].org/     16  [facility] - Mental Health Crisis Housing Search Assistance Distance: 3.26 miles      In-Person, Phone/Virtual   [address] [facility] [address] [place] [address]  Language: English  Hours: Mon - Tue 8:00 AM - 4:30 PM , Wed 8:00 AM - 6:00 PM , Thu - Fri 8:00 AM - 4:30 PM  Fees: Free   Phone: (468) 959-4807 Email: [facility]@co.[facility]. Website: https://www.[facility]./residents/health-medical/clinics-services/mental-health/adult-mental-health     Shelter for families  17   [facility] Distance: 15.06 miles      In-Person   [address] [place] [address]  Language: English  Hours: Mon - Fri 3:00 PM - 9:00 AM , Sat - Sun Open 24 Hours  Fees: Free   Phone: (813) 618-2765 Ext.1 Website: https://www.saintGlasgows.introNetworks/2020/07/03/emergency-family-shelter/     Shelter for individuals  18  [facility] [place] [facility] [place] [facility] [place] [facility] Distance: 0.87 miles      In-Person   [address] [place] [address]  Language: English  Hours: Mon - Sun 5:00 PM - 10:00 AM  Fees: Free, Self Pay   Phone: (354) 605-6487 Email: info@iAdvize.introNetworks Website: https://www.[facility].org/locations/[facility]-saint-paul/     19  [facility] Distance: 7.26 miles      In-Person   [address] [place] [address]  Language: English  Hours: Mon - Sun Open 24 Hours  Fees: Free   Phone: (866) 923-8854 Email: communications@oscs-mn.org Website: https://oscs-mn.org/oursaviourshousing/          Transportation       Free or low-cost transportation  20  [employer] Distance: 4.1 miles      In-Person   [address] [place] [address]  Language: English, Iraqi  Hours: Mon 9:00 AM - 5:00 PM , Tue 9:30 AM - 7:00 PM , Wed 9:00 AM - 5:00 PM , Thu 9:30 AM - 7:00 PM , Fri 9:00 AM - 5:00 PM  Fees: Free   Phone: (476) 750-6879 Email:  contactus@[facility].Navut Website: http://www.Origin DigitalYicha Online     21  [employer] [facility] Cir[facility] Bus Distance: 4.58 miles      In-Person   [address] [place] [place] [address]  Language: English  Hours: Tue 9:00 AM - 2:00 PM  Fees: Self Pay   Phone: (374) 650-8185 Email: info@[employer] Website: http://www.CHIC.TV.org/     Transportation to medical appointments  22  [employer] [facility] [employer] - Non-Emergency Medical Transportation Distance: 1.11 miles      In-Person   [address] [place] [address]  Language: English  Hours: Mon - Fri 8:00 AM - 4:00 PM Appt. Only  Fees: Self Pay   Phone: (205) 410-3995 Website: http://www.[employer].org/Medical-Services/Emergency-medical-services/Non-emergency-transportation/     23  [employer] Distance: 3.78 miles      In-Person   [address] [address]  Language: English  Hours: Mon - Thu 6:00 AM - 6:00 PM , Fri 6:00 AM - 5:00 PM  Fees: Insurance, Self Pay   Phone: (588) 462-6217 Email: office@[employer] Website: https://www.PlanG/          Important Numbers & Websites       Emergency Services   911  City Services   311  Poison Control   (848) 322-8532  Suicide Prevention Lifeline   (413) 771-7647 (TALK)  Child Abuse Hotline   (521) 224-9288 (4-A-Child)  Sexual Assault Hotline   (318) 293-2141 (HOPE)  National Runaway Safeline   (762) 715-5950 (RUNAWAY)  All-Options Talkline   (397) 702-3698  Substance Abuse Referral   (428) 253-8007 (HELP)

## 2023-10-24 NOTE — Clinical Note
Prasanth Reilly,   I forgot to have you sign the T refills yesterday. If you could sign prior to closing the encounter, that would be great! Thanks.   Eunice

## 2023-10-24 NOTE — PATIENT INSTRUCTIONS
Patient Education   Here is the plan from today's visit    LABS TODAY   IF LABS LOOK OK, I WILL MESSAGE YOU TO INCREASE T TO 0.3 mL WEEKLY     COME BACK IN 3 MONTHS, MIDCYCLE, TO RECHECK AND DO LABS     IF THINGS ARE GETTING WORSE WITH MOOD, COME BACK    Please call or return to clinic if your symptoms don't go away.    Follow up plan  Return in about 3 months (around 1/24/2024).    Thank you for coming to [facility] today.  Lab Testing:  **If you had lab testing today and your results are reassuring or normal they will be mailed to you or sent through [employer] within 7 days.   **If the lab tests need quick action we will call you with the results.  **If you are having labs done on a different day, please call [phone] to schedule at [facility] or [phone] for other [facility] Outpatient Lab locations. Labs do not offer walk-in appointments.  The phone number we will call with results is # [phone] (home) . If this is not the best number please call our clinic and change the number.  Medication Refills:  If you need any refills please call your pharmacy and they will contact us.   If you need to  your refill at a new pharmacy, please contact the new pharmacy directly. The new pharmacy will help you get your medications transferred faster.   Scheduling:  If you have any concerns about today's visit or wish to schedule another appointment please call our office during normal business hours [phone] (8-5:00 M-F). If you can no longer make a scheduled visit, please cancel via [employer] or call us to cancel.   If a referral was made to an [facility] specialty provider and you do not get a call from central scheduling, please refer to directions on your visit summary or call our office during normal business hours for assistance.   If a Mammogram was ordered for you at the Breast Center call [phone] to schedule or change your appointment.  If you had an XRay/CT/Ultrasound/MRI  ordered the number is [phone] to schedule or change your radiology appointment.   [facility] has limited ultrasound appointments available on Wednesdays, if you would like your ultrasound at [facility], please call [phone] to schedule.   Medical Concerns:  If you have urgent medical concerns please call [phone] at any time of the day.    Yahaira Hunt MD

## 2023-10-26 LAB — TESTOST SERPL-MCNC: 667 NG/DL (ref 8–950)

## 2023-10-27 RX ORDER — TESTOSTERONE CYPIONATE 200 MG/ML
50 INJECTION, SOLUTION INTRAMUSCULAR WEEKLY
Qty: 4 ML | Refills: 5 | Status: SHIPPED | OUTPATIENT
Start: 2023-10-27 | End: 2023-10-30

## 2023-10-30 DIAGNOSIS — F64.0 GENDER DYSPHORIA IN ADULT: ICD-10-CM

## 2023-10-30 RX ORDER — TESTOSTERONE CYPIONATE 200 MG/ML
60 INJECTION, SOLUTION INTRAMUSCULAR WEEKLY
Start: 2023-10-30 | End: 2023-12-18

## 2023-12-16 ENCOUNTER — MYC MEDICAL ADVICE (OUTPATIENT)
Dept: FAMILY MEDICINE | Facility: CLINIC | Age: 43
End: 2023-12-16
Payer: COMMERCIAL

## 2023-12-16 DIAGNOSIS — F64.0 GENDER DYSPHORIA IN ADULT: ICD-10-CM

## 2023-12-18 RX ORDER — TESTOSTERONE CYPIONATE 200 MG/ML
60 INJECTION, SOLUTION INTRAMUSCULAR WEEKLY
Qty: 4 ML | Refills: 1 | Status: SHIPPED | OUTPATIENT
Start: 2023-12-18 | End: 2024-02-05

## 2023-12-18 NOTE — TELEPHONE ENCOUNTER

## 2024-02-05 ENCOUNTER — OFFICE VISIT (OUTPATIENT)
Dept: FAMILY MEDICINE | Facility: CLINIC | Age: 44
End: 2024-02-05
Payer: COMMERCIAL

## 2024-02-05 VITALS
TEMPERATURE: 98.1 F | DIASTOLIC BLOOD PRESSURE: 78 MMHG | BODY MASS INDEX: 33.19 KG/M2 | HEIGHT: 65 IN | SYSTOLIC BLOOD PRESSURE: 129 MMHG | HEART RATE: 73 BPM | OXYGEN SATURATION: 98 % | WEIGHT: 199.2 LBS

## 2024-02-05 DIAGNOSIS — Z23 NEED FOR PROPHYLACTIC VACCINATION AND INOCULATION AGAINST INFLUENZA: ICD-10-CM

## 2024-02-05 DIAGNOSIS — E55.9 VITAMIN D DEFICIENCY: ICD-10-CM

## 2024-02-05 DIAGNOSIS — E78.2 MIXED HYPERLIPIDEMIA: ICD-10-CM

## 2024-02-05 DIAGNOSIS — F64.0 GENDER DYSPHORIA IN ADULT: Primary | ICD-10-CM

## 2024-02-05 DIAGNOSIS — Z23 HIGH PRIORITY FOR 2019-NCOV VACCINE: ICD-10-CM

## 2024-02-05 PROCEDURE — 90480 ADMN SARSCOV2 VAC 1/ONLY CMP: CPT | Performed by: STUDENT IN AN ORGANIZED HEALTH CARE EDUCATION/TRAINING PROGRAM

## 2024-02-05 PROCEDURE — 36415 COLL VENOUS BLD VENIPUNCTURE: CPT | Performed by: STUDENT IN AN ORGANIZED HEALTH CARE EDUCATION/TRAINING PROGRAM

## 2024-02-05 PROCEDURE — 90471 IMMUNIZATION ADMIN: CPT | Performed by: STUDENT IN AN ORGANIZED HEALTH CARE EDUCATION/TRAINING PROGRAM

## 2024-02-05 PROCEDURE — 99214 OFFICE O/P EST MOD 30 MIN: CPT | Mod: 25 | Performed by: STUDENT IN AN ORGANIZED HEALTH CARE EDUCATION/TRAINING PROGRAM

## 2024-02-05 PROCEDURE — 90686 IIV4 VACC NO PRSV 0.5 ML IM: CPT | Performed by: STUDENT IN AN ORGANIZED HEALTH CARE EDUCATION/TRAINING PROGRAM

## 2024-02-05 PROCEDURE — 84403 ASSAY OF TOTAL TESTOSTERONE: CPT | Mod: KX | Performed by: STUDENT IN AN ORGANIZED HEALTH CARE EDUCATION/TRAINING PROGRAM

## 2024-02-05 PROCEDURE — 84270 ASSAY OF SEX HORMONE GLOBUL: CPT | Mod: KX | Performed by: STUDENT IN AN ORGANIZED HEALTH CARE EDUCATION/TRAINING PROGRAM

## 2024-02-05 PROCEDURE — 91320 SARSCV2 VAC 30MCG TRS-SUC IM: CPT | Performed by: STUDENT IN AN ORGANIZED HEALTH CARE EDUCATION/TRAINING PROGRAM

## 2024-02-05 RX ORDER — ATORVASTATIN CALCIUM 40 MG/1
40 TABLET, FILM COATED ORAL DAILY
Qty: 90 TABLET | Refills: 3 | Status: SHIPPED | OUTPATIENT
Start: 2024-02-05

## 2024-02-05 RX ORDER — CHOLECALCIFEROL (VITAMIN D3) 50 MCG
1 TABLET ORAL DAILY
Qty: 90 TABLET | Refills: 3 | Status: SHIPPED | OUTPATIENT
Start: 2024-02-05

## 2024-02-05 RX ORDER — ARIPIPRAZOLE 20 MG/1
20 TABLET ORAL DAILY
COMMUNITY
Start: 2024-02-02

## 2024-02-05 RX ORDER — TESTOSTERONE CYPIONATE 200 MG/ML
60 INJECTION, SOLUTION INTRAMUSCULAR WEEKLY
Qty: 4 ML | Refills: 1 | Status: SHIPPED | OUTPATIENT
Start: 2024-02-05 | End: 2024-07-25

## 2024-02-05 ASSESSMENT — ANXIETY QUESTIONNAIRES
IF YOU CHECKED OFF ANY PROBLEMS ON THIS QUESTIONNAIRE, HOW DIFFICULT HAVE THESE PROBLEMS MADE IT FOR YOU TO DO YOUR WORK, TAKE CARE OF THINGS AT HOME, OR GET ALONG WITH OTHER PEOPLE: SOMEWHAT DIFFICULT
5. BEING SO RESTLESS THAT IT IS HARD TO SIT STILL: NOT AT ALL
1. FEELING NERVOUS, ANXIOUS, OR ON EDGE: NOT AT ALL
3. WORRYING TOO MUCH ABOUT DIFFERENT THINGS: SEVERAL DAYS
GAD7 TOTAL SCORE: 1
6. BECOMING EASILY ANNOYED OR IRRITABLE: NOT AT ALL
GAD7 TOTAL SCORE: 1
7. FEELING AFRAID AS IF SOMETHING AWFUL MIGHT HAPPEN: NOT AT ALL
2. NOT BEING ABLE TO STOP OR CONTROL WORRYING: NOT AT ALL

## 2024-02-05 ASSESSMENT — PATIENT HEALTH QUESTIONNAIRE - PHQ9
5. POOR APPETITE OR OVEREATING: NOT AT ALL
SUM OF ALL RESPONSES TO PHQ QUESTIONS 1-9: 2

## 2024-02-05 NOTE — PATIENT INSTRUCTIONS
Moni Kearns, she/they  Intake and Referral Coordinator  [facility]   [phone]

## 2024-02-05 NOTE — PROGRESS NOTES
"  Assessment & Plan     Jesus was seen today for follow up, imm/inj and imm/inj.    Diagnoses and all orders for this visit:    Gender dysphoria in adult  -     syringe, disposable, 1 ML MISC; Use once weekly to draw up hormones  -     Needle, Disp, 25G X 1-1/2\" MISC; Use once weekly for administering hormone IM  -     testosterone cypionate (DEPOTESTOSTERONE) 200 MG/ML injection; Inject 0.3 mLs (60 mg) into the muscle once a week  -     Testosterone Free and Total; Future  -     Comprehensive Gender Care Referral - Internal; Future  -     Testosterone Free and Total  -     Other Specialty Referral - External  Refilled supplies.   - Shot day on Fridays, Dose is 60mg (0.3 mL). T level (which is a day prior to midcycle) and is 973. Not too high, so will keep meds at current dose.   - He is interested in lower phalloplasty. Connected with [facility] gender care [facility] Moni and provided Velez referral, as we do not have a surgeon at [facility] who can perform phallo step 1 at this time.      Vitamin D deficiency  -     vitamin D3 (CHOLECALCIFEROL) 50 mcg (2000 units) tablet; Take 1 tablet (50 mcg) by mouth daily  Refilled meds.    Mixed hyperlipidemia  -     atorvastatin (LIPITOR) 40 MG tablet; Take 1 tablet (40 mg) by mouth daily  Increased med given recent lipid levels.     Need for prophylactic vaccination and inoculation against influenza  -     INFLUENZA VACCINE IM > 6 MONTHS VALENT IIV4 (AFLURIA/FLUZONE)    High priority for 2019-nCoV vaccine  -     COVID-19 12+ (2023-24) (PFIZER)      Ordering of each unique test  Prescription drug management    BMI  Estimated body mass index is 33.15 kg/m  as calculated from the following:    Height as of this encounter: 1.651 m (5' 5\").    Weight as of this encounter: 90.4 kg (199 lb 3.2 oz).   Weight management plan: Discussed healthy diet and exercise guidelines      Return in about 4 months (around 6/5/2024) for Preventative Care.    Sherrie Lee is a 43 year old, presenting for the " "following health issues:  Follow Up (3 month testosterone follow up ), Imm/Inj (Flu Shot), and Imm/Inj (COVID-19 VACCINE)        2/5/2024     3:39 PM   Additional Questions   Roomed by Ramiro MYLES     Testosterone  - Shot day on Fridays   - Dose is 60mg (0.3 mL)           2/22/2021     4:46 PM 11/3/2021     2:33 PM 2/5/2024     4:14 PM   PHQ   PHQ-9 Total Score 10 8 2   Q9: Thoughts of better off dead/self-harm past 2 weeks Not at all Not at all Not at all         6/22/2020     1:33 PM 2/22/2021     4:46 PM 2/5/2024     4:14 PM   LINO-7 SCORE   Total Score 4 7 1             Objective    /78   Pulse 73   Temp 98.1  F (36.7  C) (Oral)   Ht 1.651 m (5' 5\")   Wt 90.4 kg (199 lb 3.2 oz)   SpO2 98%   BMI 33.15 kg/m    Body mass index is 33.15 kg/m .  Physical Exam   General: Alert and oriented, in no acute distress.  Skin: Warm and dry, no abnormalities noted.  Eyes: Extra-ocular muscles grossly intact, pupils equal.  ENT: Speech intact, nasal passages open, no hearing impairment noted.  CV: No cyanosis or pallor, warm and well perfused.  Respiratory: No respiratory distress, no accessory muscle use.  Neuro: Gait and station normal, comprehension intact. Gross and fine motor skills intact.   Psychiatric: Mood and affect appear normal.   Extremities: Warm, able to move all four extremities at will.      Results for orders placed or performed in visit on 02/05/24   Sex Hormone Binding Globulin     Status: Normal   Result Value Ref Range    Sex Hormone Binding Globulin 16 11 - 135 nmol/L    Narrative    The generation of reference intervals for this test is currently based on binary male or female sex. If the electronic health record information indicates another gender identity or if Legal Sex is recorded as \"Unknown\", both male and female reference intervals are provided where applicable, and should be considered according to the individual's appropriate clinical context.   Testosterone Free and Total     " "Status: Abnormal   Result Value Ref Range    Free Testosterone Calculated 31.29 ng/dL    Testosterone Total 973 (H) 8 - 950 ng/dL    Narrative    The generation of reference intervals for this test is currently based on binary male or female sex. If the electronic health record information indicates another gender identity or if Legal Sex is recorded as \"Unknown\", both male and female reference intervals are provided where applicable, and should be considered according to the individual's appropriate clinical context.  This test was developed and its performance characteristics determined by the [facility],  Special Chemistry Laboratory. It has not been cleared or approved by the FDA. The laboratory is regulated under CLIA as qualified to perform high-complexity testing. This test is used for clinical purposes. It should not be regarded as investigational or for research.   Testosterone Free and Total     Status: Abnormal    Narrative    The following orders were created for panel order Testosterone Free and Total.  Procedure                               Abnormality         Status                     ---------                               -----------         ------                     Sex Hormone Binding Glob...[[id number]]  Normal              Final result               Testosterone Free and Total[[id number]]  Abnormal            Final result                 Please view results for these tests on the individual orders.           Signed Electronically by: Emerson Machado DO  "

## 2024-02-06 LAB — SHBG SERPL-SCNC: 16 NMOL/L (ref 11–135)

## 2024-02-08 ENCOUNTER — TELEPHONE (OUTPATIENT)
Dept: PLASTIC SURGERY | Facility: CLINIC | Age: 44
End: 2024-02-08
Payer: COMMERCIAL

## 2024-02-08 LAB
TESTOST FREE SERPL-MCNC: 31.29 NG/DL
TESTOST SERPL-MCNC: 973 NG/DL (ref 8–950)

## 2024-02-08 NOTE — CONFIDENTIAL NOTE
Writer called to discuss request for bottom surgery. Explained the basic differences between meta and phallo. Pt is interested in phallo and would like to stay in [place] - gave phone number for [facility].

## 2024-06-23 ENCOUNTER — HEALTH MAINTENANCE LETTER (OUTPATIENT)
Age: 44
End: 2024-06-23

## 2024-07-25 DIAGNOSIS — F64.0 GENDER DYSPHORIA IN ADULT: ICD-10-CM

## 2024-07-25 RX ORDER — TESTOSTERONE CYPIONATE 200 MG/ML
60 INJECTION, SOLUTION INTRAMUSCULAR WEEKLY
Qty: 4 ML | Refills: 5 | Status: SHIPPED | OUTPATIENT
Start: 2024-07-25

## 2024-07-25 NOTE — TELEPHONE ENCOUNTER
"Additional Comments: The patient is requesting refills for these medications. The patient is currently in treatment. Pls call facility if there are any concerns with refill requests.     Request for medication refill:    Providers if patient needs an appointment and you are willing to give a one month supply please refill for one month and  send a letter/MyChart using \".SMILLIMITEDREFILL\" .smillimited and route chart to \"P SMI \" (Giving one month refill in non controlled medications is strongly recommended before denial)    If refill has been denied, meaning absolutely no refills without visit, please complete the smart phrase \".smirxrefuse\" and route it to the \"P SMI MED REFILLS\"  pool to inform the patient and the pharmacy.    Marcy Garsia RN          "

## 2024-07-25 NOTE — TELEPHONE ENCOUNTER
"[facility] phone call message- patient requesting a refill:    Full Medication Name:     -syringe, disposable, 1 ML MISC   -Needle, Disp, 25G X 1-1/2\" MISC   -testosterone cypionate (DEPOTESTOSTERONE) 200 MG/ML injection         Pharmacy confirmed as     [facility] [place] [address] - [place], [place] - [address]  [address]  [address]  [facility] [address]  Phone: [phone] Fax: [phone]      : Yes    Additional Comments: The patient is requesting refills for these medications. The patient is currently in treatment. [facility] call facility if there are any concerns with refill requests. The facility is:    [address] [address]  P: [phone]    OK to leave a message on voice mail? Yes    Primary language: English      needed? No    Call taken on July 25, 2024 at 8:29 AM by Anuja North   "
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
## 2025-07-12 ENCOUNTER — HEALTH MAINTENANCE LETTER (OUTPATIENT)
Age: 45
End: 2025-07-12

## (undated) DEVICE — PREP CHLORAPREP 26ML TINTED ORANGE  260815

## (undated) DEVICE — SOL WATER IRRIG 1000ML BOTTLE 07139-09

## (undated) DEVICE — DRSG KERLIX 4 1/2"X4YDS ROLL 6730

## (undated) DEVICE — BLADE KNIFE SURG 15 371115

## (undated) DEVICE — PEN MARKING SKIN W/LABELS 31145918

## (undated) DEVICE — Device

## (undated) DEVICE — CLOSURE SYS SKIN PREMIERPRO EXOFINFUSION 4X60CM 3473

## (undated) DEVICE — SOL NACL 0.9% IRRIG 1000ML BOTTLE 2F7124

## (undated) DEVICE — SOL WATER IRRIG 1000ML BOTTLE 2F7114

## (undated) DEVICE — GLOVE GAMMEX NEOPRENE ULTRA SZ 7.5 LF 8515

## (undated) DEVICE — SOL ADH LIQUID BENZOIN SWAB 0.6ML C1544

## (undated) DEVICE — SU ETHILON 4-0 P-3 18" BLACK 699G

## (undated) DEVICE — CATH TRAY FOLEY SURESTEP 16FR WDRAIN BAG STLK LATEX A300316A

## (undated) DEVICE — GOWN XLG DISP 9545

## (undated) DEVICE — ADH LIQUID MASTISOL TOPICAL VIAL 2-3ML 0523-48

## (undated) DEVICE — DRSG TEGADERM 2 3/8X2 3/4" 1624W

## (undated) DEVICE — BNDG ELASTIC 4"X5YDS UNSTERILE 6611-40

## (undated) DEVICE — BLADE KNIFE SURG 10 371110

## (undated) DEVICE — DRAIN JACKSON PRATT 15FR ROUND SU130-1323

## (undated) DEVICE — NDL 19GA 1.5"

## (undated) DEVICE — SU SILK 2-0 TIE 12X30" A305H

## (undated) DEVICE — POSITIONER ARMBOARD FOAM 1PAIR LF FP-ARMB1

## (undated) DEVICE — CAST PADDING 4" UNSTERILE 9044

## (undated) DEVICE — LINEN DRAPE 54X72" 5467

## (undated) DEVICE — DRSG XEROFORM 5X9" 8884431605

## (undated) DEVICE — LIGHT HANDLE X2

## (undated) DEVICE — EYE MARKING PAD 581057

## (undated) DEVICE — STRAP KNEE/BODY 31143004

## (undated) DEVICE — DRSG COTTON BALL 6PK LCB62

## (undated) DEVICE — DECANTER TRANSFER DEVICE 2008S

## (undated) DEVICE — POSITIONER HEAD DONUT FOAM 9" LF FP-HEAD9

## (undated) DEVICE — CAST PLASTER SPLINT 4X15" 7394

## (undated) DEVICE — DRAIN JACKSON PRATT RESERVOIR 100ML SU130-1305

## (undated) DEVICE — TOURNIQUET SGL BLADDER 18"X5.5" RED 5921-018-145

## (undated) DEVICE — LINEN TOWEL PACK X5 5464

## (undated) DEVICE — SPONGE LAP 18X18" X8435

## (undated) DEVICE — DRSG ABDOMINAL 07 1/2X8" 7197D

## (undated) DEVICE — PACK HAND WRIST SOP15HWFSP

## (undated) DEVICE — ESU HOLSTER PLASTIC DISP E2400

## (undated) DEVICE — SU VICRYL 3-0 FS-1 27" J442H

## (undated) DEVICE — ESU BLADE PEAK PLASMA 3.0S PS210-030S

## (undated) DEVICE — PAD CHUX UNDERPAD 30X36" P3036C

## (undated) DEVICE — WIPES FOLEY CARE SURESTEP PROVON DFC100

## (undated) DEVICE — SU VICRYL 4-0 PS-1 18" UND J682G

## (undated) DEVICE — GLOVE PROTEXIS POWDER FREE 7.5 ORTHOPEDIC 2D73ET75

## (undated) DEVICE — ESU GROUND PAD UNIVERSAL W/O CORD

## (undated) DEVICE — SYR BULB IRRIG 50ML LATEX FREE 0035280

## (undated) DEVICE — ESU PENCIL W/SMOKE EVAC NEPTUNE STRYKER 0703-046-000

## (undated) DEVICE — SU PLAIN FAST ABSORB 5-0 PC-1 18" 1915G

## (undated) DEVICE — DRAPE LAP TRANSVERSE 29421

## (undated) DEVICE — TUBING SUCTION MEDI-VAC 1/4"X20' N620A

## (undated) DEVICE — BNDG ELASTIC 6" DBL LENGTH UNSTERILE 6611-16

## (undated) DEVICE — STPL SKIN 35W ROTATING HEAD PRW35

## (undated) DEVICE — SUCTION MANIFOLD NEPTUNE 2 SYS 1 PORT 702-025-000

## (undated) DEVICE — SU ETHILON 4-0 FS-2 18" 662H

## (undated) DEVICE — SUCTION MANIFOLD DORNOCH ULTRA CART UL-CL500

## (undated) DEVICE — LINEN ORTHO PACK 5446

## (undated) DEVICE — GLOVE PROTEXIS MICRO 6.5  2D73PM65

## (undated) DEVICE — ESU PENCIL SMOKE EVAC W/ROCKER SWITCH 0703-047-000

## (undated) DEVICE — SU SILK 2-0 SH 30" K833H

## (undated) DEVICE — GLOVE PROTEXIS W/NEU-THERA 7.5  2D73TE75

## (undated) DEVICE — DRAPE STERI TOWEL SM 1000

## (undated) DEVICE — PACK HAND CUSTOM ASC

## (undated) DEVICE — BNDG ELASTIC 6"X5YDS UNSTERILE 6611-60

## (undated) DEVICE — BNDG ELASTIC 3"X5YDS UNSTERILE 6611-30

## (undated) DEVICE — ESU ELEC NDL 1" COATED/INSULATED E1465

## (undated) DEVICE — DRSG GAUZE 2X2" 8042

## (undated) DEVICE — GLOVE PROTEXIS W/NEU-THERA 8.0  2D73TE80

## (undated) RX ORDER — LIDOCAINE HYDROCHLORIDE 20 MG/ML
INJECTION, SOLUTION EPIDURAL; INFILTRATION; INTRACAUDAL; PERINEURAL
Status: DISPENSED
Start: 2020-01-08

## (undated) RX ORDER — LIDOCAINE HYDROCHLORIDE 10 MG/ML
INJECTION, SOLUTION EPIDURAL; INFILTRATION; INTRACAUDAL; PERINEURAL
Status: DISPENSED
Start: 2021-02-05

## (undated) RX ORDER — DEXAMETHASONE SODIUM PHOSPHATE 4 MG/ML
INJECTION, SOLUTION INTRA-ARTICULAR; INTRALESIONAL; INTRAMUSCULAR; INTRAVENOUS; SOFT TISSUE
Status: DISPENSED
Start: 2020-01-08

## (undated) RX ORDER — FENTANYL CITRATE 50 UG/ML
INJECTION, SOLUTION INTRAMUSCULAR; INTRAVENOUS
Status: DISPENSED
Start: 2020-01-08

## (undated) RX ORDER — HYDROMORPHONE HYDROCHLORIDE 1 MG/ML
INJECTION, SOLUTION INTRAMUSCULAR; INTRAVENOUS; SUBCUTANEOUS
Status: DISPENSED
Start: 2020-01-08

## (undated) RX ORDER — LIDOCAINE HYDROCHLORIDE 10 MG/ML
INJECTION, SOLUTION EPIDURAL; INFILTRATION; INTRACAUDAL; PERINEURAL
Status: DISPENSED
Start: 2020-12-11

## (undated) RX ORDER — METHYLPREDNISOLONE ACETATE 40 MG/ML
INJECTION, SUSPENSION INTRA-ARTICULAR; INTRALESIONAL; INTRAMUSCULAR; SOFT TISSUE
Status: DISPENSED
Start: 2020-12-11

## (undated) RX ORDER — LIDOCAINE HYDROCHLORIDE AND EPINEPHRINE 10; 10 MG/ML; UG/ML
INJECTION, SOLUTION INFILTRATION; PERINEURAL
Status: DISPENSED
Start: 2021-02-05

## (undated) RX ORDER — OXYCODONE HYDROCHLORIDE 5 MG/1
TABLET ORAL
Status: DISPENSED
Start: 2020-01-08

## (undated) RX ORDER — BUPIVACAINE HYDROCHLORIDE 5 MG/ML
INJECTION, SOLUTION EPIDURAL; INTRACAUDAL
Status: DISPENSED
Start: 2020-01-08

## (undated) RX ORDER — PROPOFOL 10 MG/ML
INJECTION, EMULSION INTRAVENOUS
Status: DISPENSED
Start: 2020-01-08

## (undated) RX ORDER — BETAMETHASONE SODIUM PHOSPHATE AND BETAMETHASONE ACETATE 3; 3 MG/ML; MG/ML
INJECTION, SUSPENSION INTRA-ARTICULAR; INTRALESIONAL; INTRAMUSCULAR; SOFT TISSUE
Status: DISPENSED
Start: 2021-03-18

## (undated) RX ORDER — CEFAZOLIN SODIUM 1 G/3ML
INJECTION, POWDER, FOR SOLUTION INTRAMUSCULAR; INTRAVENOUS
Status: DISPENSED
Start: 2020-01-08

## (undated) RX ORDER — LIDOCAINE HYDROCHLORIDE 10 MG/ML
INJECTION, SOLUTION EPIDURAL; INFILTRATION; INTRACAUDAL; PERINEURAL
Status: DISPENSED
Start: 2021-03-18

## (undated) RX ORDER — ONDANSETRON 2 MG/ML
INJECTION INTRAMUSCULAR; INTRAVENOUS
Status: DISPENSED
Start: 2020-01-08

## (undated) RX ORDER — CEFAZOLIN SODIUM 2 G/100ML
INJECTION, SOLUTION INTRAVENOUS
Status: DISPENSED
Start: 2020-01-08

## (undated) RX ORDER — PHENYLEPHRINE HCL IN 0.9% NACL 1 MG/10 ML
SYRINGE (ML) INTRAVENOUS
Status: DISPENSED
Start: 2020-01-08